# Patient Record
Sex: MALE | Race: WHITE | NOT HISPANIC OR LATINO | Employment: FULL TIME | URBAN - METROPOLITAN AREA
[De-identification: names, ages, dates, MRNs, and addresses within clinical notes are randomized per-mention and may not be internally consistent; named-entity substitution may affect disease eponyms.]

---

## 2020-03-20 ENCOUNTER — HOSPITAL ENCOUNTER (INPATIENT)
Facility: HOSPITAL | Age: 60
LOS: 2 days | Discharge: HOME | End: 2020-03-22
Attending: EMERGENCY MEDICINE | Admitting: HOSPITALIST

## 2020-03-20 ENCOUNTER — APPOINTMENT (OUTPATIENT)
Dept: CT IMAGING | Facility: HOSPITAL | Age: 60
End: 2020-03-20

## 2020-03-20 DIAGNOSIS — N28.0 KIDNEY INFARCTION (HCC): Primary | ICD-10-CM

## 2020-03-20 DIAGNOSIS — R10.9 FLANK PAIN: ICD-10-CM

## 2020-03-20 DIAGNOSIS — R11.2 INTRACTABLE VOMITING WITH NAUSEA, UNSPECIFIED VOMITING TYPE: ICD-10-CM

## 2020-03-20 LAB
ALBUMIN SERPL-MCNC: 4.3 G/DL (ref 3.5–5.2)
ALBUMIN/GLOB SERPL: 1.6 G/DL
ALP SERPL-CCNC: 69 U/L (ref 39–117)
ALT SERPL W P-5'-P-CCNC: 22 U/L (ref 1–41)
ANION GAP SERPL CALCULATED.3IONS-SCNC: 14 MMOL/L (ref 5–15)
APTT PPP: 21.1 SECONDS (ref 24–31)
AST SERPL-CCNC: 20 U/L (ref 1–40)
BACTERIA UR QL AUTO: ABNORMAL /HPF
BASOPHILS # BLD AUTO: 0.1 10*3/MM3 (ref 0–0.2)
BASOPHILS NFR BLD AUTO: 0.6 % (ref 0–1.5)
BILIRUB SERPL-MCNC: 0.8 MG/DL (ref 0.2–1.2)
BILIRUB UR QL STRIP: NEGATIVE
BUN BLD-MCNC: 10 MG/DL (ref 6–20)
BUN/CREAT SERPL: 6.8 (ref 7–25)
CALCIUM SPEC-SCNC: 10.2 MG/DL (ref 8.6–10.5)
CHLORIDE SERPL-SCNC: 102 MMOL/L (ref 98–107)
CLARITY UR: CLEAR
CO2 SERPL-SCNC: 24 MMOL/L (ref 22–29)
COLOR UR: ABNORMAL
CREAT BLD-MCNC: 1.46 MG/DL (ref 0.76–1.27)
DEPRECATED RDW RBC AUTO: 42 FL (ref 37–54)
EOSINOPHIL # BLD AUTO: 0 10*3/MM3 (ref 0–0.4)
EOSINOPHIL NFR BLD AUTO: 0.3 % (ref 0.3–6.2)
ERYTHROCYTE [DISTWIDTH] IN BLOOD BY AUTOMATED COUNT: 12.7 % (ref 12.3–15.4)
GFR SERPL CREATININE-BSD FRML MDRD: 50 ML/MIN/1.73
GLOBULIN UR ELPH-MCNC: 2.7 GM/DL
GLUCOSE BLD-MCNC: 118 MG/DL (ref 65–99)
GLUCOSE UR STRIP-MCNC: NEGATIVE MG/DL
HCT VFR BLD AUTO: 48.3 % (ref 37.5–51)
HGB BLD-MCNC: 17.1 G/DL (ref 13–17.7)
HGB UR QL STRIP.AUTO: NEGATIVE
HOLD SPECIMEN: NORMAL
HYALINE CASTS UR QL AUTO: ABNORMAL /LPF
INR PPP: 1.08 (ref 0.9–1.1)
KETONES UR QL STRIP: ABNORMAL
LEUKOCYTE ESTERASE UR QL STRIP.AUTO: NEGATIVE
LIPASE SERPL-CCNC: 17 U/L (ref 13–60)
LYMPHOCYTES # BLD AUTO: 1.5 10*3/MM3 (ref 0.7–3.1)
LYMPHOCYTES NFR BLD AUTO: 15.7 % (ref 19.6–45.3)
MCH RBC QN AUTO: 33.3 PG (ref 26.6–33)
MCHC RBC AUTO-ENTMCNC: 35.5 G/DL (ref 31.5–35.7)
MCV RBC AUTO: 93.8 FL (ref 79–97)
MONOCYTES # BLD AUTO: 0.3 10*3/MM3 (ref 0.1–0.9)
MONOCYTES NFR BLD AUTO: 3.2 % (ref 5–12)
NEUTROPHILS # BLD AUTO: 7.6 10*3/MM3 (ref 1.7–7)
NEUTROPHILS NFR BLD AUTO: 80.2 % (ref 42.7–76)
NITRITE UR QL STRIP: NEGATIVE
NRBC BLD AUTO-RTO: 0.9 /100 WBC (ref 0–0.2)
PH UR STRIP.AUTO: 6 [PH] (ref 5–8)
PLAT MORPH BLD: NORMAL
PLATELET # BLD AUTO: 189 10*3/MM3 (ref 140–450)
PMV BLD AUTO: 9.2 FL (ref 6–12)
POTASSIUM BLD-SCNC: 3.9 MMOL/L (ref 3.5–5.2)
PROT SERPL-MCNC: 7 G/DL (ref 6–8.5)
PROT UR QL STRIP: ABNORMAL
PROTHROMBIN TIME: 11.2 SECONDS (ref 9.6–11.7)
RBC # BLD AUTO: 5.15 10*6/MM3 (ref 4.14–5.8)
RBC # UR: ABNORMAL /HPF
RBC MORPH BLD: NORMAL
REF LAB TEST METHOD: ABNORMAL
SODIUM BLD-SCNC: 140 MMOL/L (ref 136–145)
SP GR UR STRIP: 1.02 (ref 1–1.03)
SQUAMOUS #/AREA URNS HPF: ABNORMAL /HPF
TROPONIN T SERPL-MCNC: <0.01 NG/ML (ref 0–0.03)
UROBILINOGEN UR QL STRIP: ABNORMAL
WBC MORPH BLD: NORMAL
WBC NRBC COR # BLD: 9.4 10*3/MM3 (ref 3.4–10.8)
WBC UR QL AUTO: ABNORMAL /HPF

## 2020-03-20 PROCEDURE — 85610 PROTHROMBIN TIME: CPT | Performed by: EMERGENCY MEDICINE

## 2020-03-20 PROCEDURE — 74177 CT ABD & PELVIS W/CONTRAST: CPT

## 2020-03-20 PROCEDURE — 99222 1ST HOSP IP/OBS MODERATE 55: CPT | Performed by: PHYSICIAN ASSISTANT

## 2020-03-20 PROCEDURE — 93005 ELECTROCARDIOGRAM TRACING: CPT | Performed by: NURSE PRACTITIONER

## 2020-03-20 PROCEDURE — 81001 URINALYSIS AUTO W/SCOPE: CPT | Performed by: NURSE PRACTITIONER

## 2020-03-20 PROCEDURE — 85007 BL SMEAR W/DIFF WBC COUNT: CPT | Performed by: EMERGENCY MEDICINE

## 2020-03-20 PROCEDURE — 25010000002 KETOROLAC TROMETHAMINE PER 15 MG: Performed by: EMERGENCY MEDICINE

## 2020-03-20 PROCEDURE — 0 IOPAMIDOL PER 1 ML: Performed by: EMERGENCY MEDICINE

## 2020-03-20 PROCEDURE — 83690 ASSAY OF LIPASE: CPT | Performed by: EMERGENCY MEDICINE

## 2020-03-20 PROCEDURE — 25010000002 ONDANSETRON PER 1 MG: Performed by: NURSE PRACTITIONER

## 2020-03-20 PROCEDURE — 93005 ELECTROCARDIOGRAM TRACING: CPT | Performed by: EMERGENCY MEDICINE

## 2020-03-20 PROCEDURE — 99284 EMERGENCY DEPT VISIT MOD MDM: CPT

## 2020-03-20 PROCEDURE — 25010000002 PROMETHAZINE PER 50 MG: Performed by: NURSE PRACTITIONER

## 2020-03-20 PROCEDURE — 84484 ASSAY OF TROPONIN QUANT: CPT | Performed by: NURSE PRACTITIONER

## 2020-03-20 PROCEDURE — 85025 COMPLETE CBC W/AUTO DIFF WBC: CPT | Performed by: EMERGENCY MEDICINE

## 2020-03-20 PROCEDURE — 85730 THROMBOPLASTIN TIME PARTIAL: CPT | Performed by: EMERGENCY MEDICINE

## 2020-03-20 PROCEDURE — 25010000002 HEPARIN (PORCINE) PER 1000 UNITS: Performed by: EMERGENCY MEDICINE

## 2020-03-20 PROCEDURE — 80053 COMPREHEN METABOLIC PANEL: CPT | Performed by: EMERGENCY MEDICINE

## 2020-03-20 RX ORDER — SODIUM CHLORIDE, SODIUM LACTATE, POTASSIUM CHLORIDE, CALCIUM CHLORIDE 600; 310; 30; 20 MG/100ML; MG/100ML; MG/100ML; MG/100ML
125 INJECTION, SOLUTION INTRAVENOUS CONTINUOUS
Status: DISCONTINUED | OUTPATIENT
Start: 2020-03-20 | End: 2020-03-21

## 2020-03-20 RX ORDER — PROMETHAZINE HYDROCHLORIDE 25 MG/ML
12.5 INJECTION, SOLUTION INTRAMUSCULAR; INTRAVENOUS ONCE
Status: COMPLETED | OUTPATIENT
Start: 2020-03-20 | End: 2020-03-20

## 2020-03-20 RX ORDER — SODIUM CHLORIDE 0.9 % (FLUSH) 0.9 %
10 SYRINGE (ML) INJECTION AS NEEDED
Status: DISCONTINUED | OUTPATIENT
Start: 2020-03-20 | End: 2020-03-22 | Stop reason: HOSPADM

## 2020-03-20 RX ORDER — HEPARIN SODIUM 10000 [USP'U]/100ML
18 INJECTION, SOLUTION INTRAVENOUS
Status: DISCONTINUED | OUTPATIENT
Start: 2020-03-20 | End: 2020-03-22

## 2020-03-20 RX ORDER — SODIUM CHLORIDE 9 MG/ML
INJECTION, SOLUTION INTRAVENOUS
Status: DISPENSED
Start: 2020-03-20 | End: 2020-03-21

## 2020-03-20 RX ORDER — KETOROLAC TROMETHAMINE 15 MG/ML
15 INJECTION, SOLUTION INTRAMUSCULAR; INTRAVENOUS ONCE
Status: COMPLETED | OUTPATIENT
Start: 2020-03-20 | End: 2020-03-20

## 2020-03-20 RX ORDER — ONDANSETRON 2 MG/ML
4 INJECTION INTRAMUSCULAR; INTRAVENOUS ONCE
Status: COMPLETED | OUTPATIENT
Start: 2020-03-20 | End: 2020-03-20

## 2020-03-20 RX ADMIN — SODIUM CHLORIDE, SODIUM LACTATE, POTASSIUM CHLORIDE, AND CALCIUM CHLORIDE 125 ML/HR: 600; 310; 30; 20 INJECTION, SOLUTION INTRAVENOUS at 22:18

## 2020-03-20 RX ADMIN — HEPARIN SODIUM 18 UNITS/KG/HR: 10000 INJECTION, SOLUTION INTRAVENOUS at 21:57

## 2020-03-20 RX ADMIN — IOPAMIDOL 100 ML: 755 INJECTION, SOLUTION INTRAVENOUS at 20:49

## 2020-03-20 RX ADMIN — ONDANSETRON 4 MG: 2 INJECTION INTRAMUSCULAR; INTRAVENOUS at 19:10

## 2020-03-20 RX ADMIN — SODIUM CHLORIDE, SODIUM LACTATE, POTASSIUM CHLORIDE, AND CALCIUM CHLORIDE 1000 ML: 600; 310; 30; 20 INJECTION, SOLUTION INTRAVENOUS at 20:31

## 2020-03-20 RX ADMIN — PROMETHAZINE HYDROCHLORIDE 12.5 MG: 25 INJECTION INTRAMUSCULAR; INTRAVENOUS at 19:50

## 2020-03-20 RX ADMIN — KETOROLAC TROMETHAMINE 15 MG: 15 INJECTION, SOLUTION INTRAMUSCULAR; INTRAVENOUS at 20:52

## 2020-03-20 NOTE — ED NOTES
Pt c/o L flank pain since this AM, denies any urinary problems      Yvette Louise RN  03/20/20 2088

## 2020-03-21 ENCOUNTER — APPOINTMENT (OUTPATIENT)
Dept: CARDIOLOGY | Facility: HOSPITAL | Age: 60
End: 2020-03-21

## 2020-03-21 PROBLEM — N17.9 AKI (ACUTE KIDNEY INJURY): Status: ACTIVE | Noted: 2020-03-21

## 2020-03-21 LAB
ALBUMIN SERPL-MCNC: 4 G/DL (ref 3.5–5.2)
ALBUMIN/GLOB SERPL: 1.7 G/DL
ALP SERPL-CCNC: 57 U/L (ref 39–117)
ALT SERPL W P-5'-P-CCNC: 25 U/L (ref 1–41)
ANION GAP SERPL CALCULATED.3IONS-SCNC: 11 MMOL/L (ref 5–15)
APTT PPP: 55.6 SECONDS (ref 61–76.5)
APTT PPP: 65.2 SECONDS (ref 61–76.5)
APTT PPP: 85.3 SECONDS (ref 61–76.5)
AST SERPL-CCNC: 28 U/L (ref 1–40)
BASOPHILS # BLD AUTO: 0 10*3/MM3 (ref 0–0.2)
BASOPHILS NFR BLD AUTO: 0.2 % (ref 0–1.5)
BH CV ECHO MEAS - ACS: 2.1 CM
BH CV ECHO MEAS - AO MAX PG (FULL): -2.4 MMHG
BH CV ECHO MEAS - AO MAX PG: 0.19 MMHG
BH CV ECHO MEAS - AO ROOT AREA (BSA CORRECTED): 1.7
BH CV ECHO MEAS - AO ROOT AREA: 6.2 CM^2
BH CV ECHO MEAS - AO ROOT DIAM: 2.8 CM
BH CV ECHO MEAS - AO V2 MAX: 21.7 CM/SEC
BH CV ECHO MEAS - ASD MEAN VEL: 57.7 CM/SEC
BH CV ECHO MEAS - AVA(V,A): 10.1 CM^2
BH CV ECHO MEAS - AVA(V,D): 10.1 CM^2
BH CV ECHO MEAS - BSA(HAYCOCK): 1.6 M^2
BH CV ECHO MEAS - BSA: 1.7 M^2
BH CV ECHO MEAS - BZI_BMI: 19 KILOGRAMS/M^2
BH CV ECHO MEAS - BZI_METRIC_HEIGHT: 172.7 CM
BH CV ECHO MEAS - BZI_METRIC_WEIGHT: 56.7 KG
BH CV ECHO MEAS - EDV(CUBED): 60.9 ML
BH CV ECHO MEAS - EDV(MOD-SP4): 85.5 ML
BH CV ECHO MEAS - EDV(TEICH): 67.3 ML
BH CV ECHO MEAS - EF(CUBED): 68.3 %
BH CV ECHO MEAS - EF(MOD-BP): 58 %
BH CV ECHO MEAS - EF(MOD-SP4): 57.8 %
BH CV ECHO MEAS - EF(TEICH): 60.4 %
BH CV ECHO MEAS - ESV(CUBED): 19.3 ML
BH CV ECHO MEAS - ESV(MOD-SP4): 36.1 ML
BH CV ECHO MEAS - ESV(TEICH): 26.6 ML
BH CV ECHO MEAS - FS: 31.8 %
BH CV ECHO MEAS - IVS/LVPW: 0.89
BH CV ECHO MEAS - IVSD: 0.91 CM
BH CV ECHO MEAS - LA DIMENSION(2D): 3.3 CM
BH CV ECHO MEAS - LV DIASTOLIC VOL/BSA (35-75): 51.1 ML/M^2
BH CV ECHO MEAS - LV MASS(C)D: 118.1 GRAMS
BH CV ECHO MEAS - LV MASS(C)DI: 70.6 GRAMS/M^2
BH CV ECHO MEAS - LV MAX PG: 2.6 MMHG
BH CV ECHO MEAS - LV MEAN PG: 1.4 MMHG
BH CV ECHO MEAS - LV SYSTOLIC VOL/BSA (12-30): 21.6 ML/M^2
BH CV ECHO MEAS - LV V1 MAX: 79.9 CM/SEC
BH CV ECHO MEAS - LV V1 MEAN: 54.4 CM/SEC
BH CV ECHO MEAS - LV V1 VTI: 20.9 CM
BH CV ECHO MEAS - LVIDD: 3.9 CM
BH CV ECHO MEAS - LVIDS: 2.7 CM
BH CV ECHO MEAS - LVOT AREA: 2.8 CM^2
BH CV ECHO MEAS - LVOT DIAM: 1.9 CM
BH CV ECHO MEAS - LVPWD: 1 CM
BH CV ECHO MEAS - MV MAX PG: 3.5 MMHG
BH CV ECHO MEAS - MV MEAN PG: 0.79 MMHG
BH CV ECHO MEAS - MV V2 MAX: 94.1 CM/SEC
BH CV ECHO MEAS - MV V2 MEAN: 35.5 CM/SEC
BH CV ECHO MEAS - MV V2 VTI: 31.3 CM
BH CV ECHO MEAS - MVA(VTI): 1.8 CM^2
BH CV ECHO MEAS - PA MAX PG: 3.2 MMHG
BH CV ECHO MEAS - PA V2 MAX: 90 CM/SEC
BH CV ECHO MEAS - PI END-D VEL: 100.6 CM/SEC
BH CV ECHO MEAS - PI MAX PG: 8.7 MMHG
BH CV ECHO MEAS - PI MAX VEL: 147.9 CM/SEC
BH CV ECHO MEAS - RAP SYSTOLE: 3 MMHG
BH CV ECHO MEAS - RVDD: 1.9 CM
BH CV ECHO MEAS - RVSP: 16.3 MMHG
BH CV ECHO MEAS - SI(CUBED): 24.8 ML/M^2
BH CV ECHO MEAS - SI(LVOT): 34.5 ML/M^2
BH CV ECHO MEAS - SI(MOD-SP4): 29.5 ML/M^2
BH CV ECHO MEAS - SI(TEICH): 24.3 ML/M^2
BH CV ECHO MEAS - SV(CUBED): 41.6 ML
BH CV ECHO MEAS - SV(LVOT): 57.7 ML
BH CV ECHO MEAS - SV(MOD-SP4): 49.4 ML
BH CV ECHO MEAS - SV(TEICH): 40.7 ML
BH CV ECHO MEAS - TR MAX VEL: 182.1 CM/SEC
BILIRUB SERPL-MCNC: 1 MG/DL (ref 0.2–1.2)
BUN BLD-MCNC: 12 MG/DL (ref 6–20)
BUN/CREAT SERPL: 6.6 (ref 7–25)
CALCIUM SPEC-SCNC: 9.3 MG/DL (ref 8.6–10.5)
CHLORIDE SERPL-SCNC: 103 MMOL/L (ref 98–107)
CK SERPL-CCNC: 105 U/L (ref 20–200)
CO2 SERPL-SCNC: 26 MMOL/L (ref 22–29)
CREAT BLD-MCNC: 1.81 MG/DL (ref 0.76–1.27)
DEPRECATED RDW RBC AUTO: 42.9 FL (ref 37–54)
EOSINOPHIL # BLD AUTO: 0 10*3/MM3 (ref 0–0.4)
EOSINOPHIL NFR BLD AUTO: 0 % (ref 0.3–6.2)
ERYTHROCYTE [DISTWIDTH] IN BLOOD BY AUTOMATED COUNT: 12.9 % (ref 12.3–15.4)
GFR SERPL CREATININE-BSD FRML MDRD: 39 ML/MIN/1.73
GLOBULIN UR ELPH-MCNC: 2.3 GM/DL
GLUCOSE BLD-MCNC: 129 MG/DL (ref 65–99)
HCT VFR BLD AUTO: 42.2 % (ref 37.5–51)
HGB BLD-MCNC: 15.4 G/DL (ref 13–17.7)
INR PPP: 1.11 (ref 0.9–1.1)
LYMPHOCYTES # BLD AUTO: 1.2 10*3/MM3 (ref 0.7–3.1)
LYMPHOCYTES NFR BLD AUTO: 10.8 % (ref 19.6–45.3)
MAGNESIUM SERPL-MCNC: 2 MG/DL (ref 1.6–2.6)
MCH RBC QN AUTO: 34.9 PG (ref 26.6–33)
MCHC RBC AUTO-ENTMCNC: 36.4 G/DL (ref 31.5–35.7)
MCV RBC AUTO: 95.7 FL (ref 79–97)
MONOCYTES # BLD AUTO: 0.2 10*3/MM3 (ref 0.1–0.9)
MONOCYTES NFR BLD AUTO: 2.1 % (ref 5–12)
NEUTROPHILS # BLD AUTO: 9.7 10*3/MM3 (ref 1.7–7)
NEUTROPHILS NFR BLD AUTO: 86.9 % (ref 42.7–76)
NRBC BLD AUTO-RTO: 0.1 /100 WBC (ref 0–0.2)
NT-PROBNP SERPL-MCNC: 763.1 PG/ML (ref 5–900)
PHOSPHATE SERPL-MCNC: 3.7 MG/DL (ref 2.5–4.5)
PLATELET # BLD AUTO: 167 10*3/MM3 (ref 140–450)
PMV BLD AUTO: 9.5 FL (ref 6–12)
POTASSIUM BLD-SCNC: 4.3 MMOL/L (ref 3.5–5.2)
PROT SERPL-MCNC: 6.3 G/DL (ref 6–8.5)
PROTHROMBIN TIME: 11.4 SECONDS (ref 9.6–11.7)
RBC # BLD AUTO: 4.41 10*6/MM3 (ref 4.14–5.8)
SODIUM BLD-SCNC: 140 MMOL/L (ref 136–145)
TROPONIN T SERPL-MCNC: <0.01 NG/ML (ref 0–0.03)
TROPONIN T SERPL-MCNC: <0.01 NG/ML (ref 0–0.03)
WBC NRBC COR # BLD: 11.2 10*3/MM3 (ref 3.4–10.8)

## 2020-03-21 PROCEDURE — 93306 TTE W/DOPPLER COMPLETE: CPT

## 2020-03-21 PROCEDURE — 83880 ASSAY OF NATRIURETIC PEPTIDE: CPT | Performed by: PHYSICIAN ASSISTANT

## 2020-03-21 PROCEDURE — 25010000002 ONDANSETRON PER 1 MG: Performed by: PHYSICIAN ASSISTANT

## 2020-03-21 PROCEDURE — 99254 IP/OBS CNSLTJ NEW/EST MOD 60: CPT | Performed by: INTERNAL MEDICINE

## 2020-03-21 PROCEDURE — 84100 ASSAY OF PHOSPHORUS: CPT | Performed by: PHYSICIAN ASSISTANT

## 2020-03-21 PROCEDURE — 85610 PROTHROMBIN TIME: CPT | Performed by: PHYSICIAN ASSISTANT

## 2020-03-21 PROCEDURE — 25010000002 MORPHINE PER 10 MG: Performed by: PHYSICIAN ASSISTANT

## 2020-03-21 PROCEDURE — 85025 COMPLETE CBC W/AUTO DIFF WBC: CPT | Performed by: PHYSICIAN ASSISTANT

## 2020-03-21 PROCEDURE — 25010000002 LORAZEPAM PER 2 MG: Performed by: PHYSICIAN ASSISTANT

## 2020-03-21 PROCEDURE — 83735 ASSAY OF MAGNESIUM: CPT | Performed by: PHYSICIAN ASSISTANT

## 2020-03-21 PROCEDURE — 93306 TTE W/DOPPLER COMPLETE: CPT | Performed by: INTERNAL MEDICINE

## 2020-03-21 PROCEDURE — 99233 SBSQ HOSP IP/OBS HIGH 50: CPT | Performed by: HOSPITALIST

## 2020-03-21 PROCEDURE — 84484 ASSAY OF TROPONIN QUANT: CPT | Performed by: PHYSICIAN ASSISTANT

## 2020-03-21 PROCEDURE — 80053 COMPREHEN METABOLIC PANEL: CPT | Performed by: PHYSICIAN ASSISTANT

## 2020-03-21 PROCEDURE — 82550 ASSAY OF CK (CPK): CPT | Performed by: PHYSICIAN ASSISTANT

## 2020-03-21 PROCEDURE — 85730 THROMBOPLASTIN TIME PARTIAL: CPT | Performed by: PHYSICIAN ASSISTANT

## 2020-03-21 PROCEDURE — 25010000002 SULFUR HEXAFLUORIDE MICROSPH 60.7-25 MG RECONSTITUTED SUSPENSION: Performed by: HOSPITALIST

## 2020-03-21 PROCEDURE — 25010000002 HEPARIN (PORCINE) PER 1000 UNITS: Performed by: PHYSICIAN ASSISTANT

## 2020-03-21 PROCEDURE — 85730 THROMBOPLASTIN TIME PARTIAL: CPT | Performed by: HOSPITALIST

## 2020-03-21 RX ORDER — POTASSIUM CHLORIDE 1.5 G/1.77G
40 POWDER, FOR SOLUTION ORAL AS NEEDED
Status: DISCONTINUED | OUTPATIENT
Start: 2020-03-21 | End: 2020-03-22 | Stop reason: HOSPADM

## 2020-03-21 RX ORDER — CALCIUM GLUCONATE 20 MG/ML
2 INJECTION, SOLUTION INTRAVENOUS AS NEEDED
Status: DISCONTINUED | OUTPATIENT
Start: 2020-03-21 | End: 2020-03-22 | Stop reason: HOSPADM

## 2020-03-21 RX ORDER — MAGNESIUM SULFATE HEPTAHYDRATE 40 MG/ML
4 INJECTION, SOLUTION INTRAVENOUS AS NEEDED
Status: DISCONTINUED | OUTPATIENT
Start: 2020-03-21 | End: 2020-03-22 | Stop reason: HOSPADM

## 2020-03-21 RX ORDER — DOCUSATE SODIUM 100 MG/1
100 CAPSULE, LIQUID FILLED ORAL 2 TIMES DAILY PRN
Status: DISCONTINUED | OUTPATIENT
Start: 2020-03-21 | End: 2020-03-22 | Stop reason: HOSPADM

## 2020-03-21 RX ORDER — CALCIUM GLUCONATE 20 MG/ML
1 INJECTION, SOLUTION INTRAVENOUS AS NEEDED
Status: DISCONTINUED | OUTPATIENT
Start: 2020-03-21 | End: 2020-03-22 | Stop reason: HOSPADM

## 2020-03-21 RX ORDER — LORAZEPAM 2 MG/ML
0.5 INJECTION INTRAMUSCULAR ONCE
Status: COMPLETED | OUTPATIENT
Start: 2020-03-21 | End: 2020-03-21

## 2020-03-21 RX ORDER — ONDANSETRON 4 MG/1
4 TABLET, FILM COATED ORAL EVERY 6 HOURS PRN
Status: DISCONTINUED | OUTPATIENT
Start: 2020-03-21 | End: 2020-03-22 | Stop reason: HOSPADM

## 2020-03-21 RX ORDER — ACETAMINOPHEN 650 MG/1
650 SUPPOSITORY RECTAL EVERY 4 HOURS PRN
Status: DISCONTINUED | OUTPATIENT
Start: 2020-03-21 | End: 2020-03-22 | Stop reason: HOSPADM

## 2020-03-21 RX ORDER — SODIUM CHLORIDE 0.9 % (FLUSH) 0.9 %
10 SYRINGE (ML) INJECTION AS NEEDED
Status: DISCONTINUED | OUTPATIENT
Start: 2020-03-21 | End: 2020-03-22 | Stop reason: HOSPADM

## 2020-03-21 RX ORDER — MAGNESIUM SULFATE HEPTAHYDRATE 40 MG/ML
2 INJECTION, SOLUTION INTRAVENOUS AS NEEDED
Status: DISCONTINUED | OUTPATIENT
Start: 2020-03-21 | End: 2020-03-22 | Stop reason: HOSPADM

## 2020-03-21 RX ORDER — ONDANSETRON 2 MG/ML
4 INJECTION INTRAMUSCULAR; INTRAVENOUS EVERY 6 HOURS PRN
Status: DISCONTINUED | OUTPATIENT
Start: 2020-03-21 | End: 2020-03-22 | Stop reason: HOSPADM

## 2020-03-21 RX ORDER — NITROGLYCERIN 0.4 MG/1
0.4 TABLET SUBLINGUAL
Status: DISCONTINUED | OUTPATIENT
Start: 2020-03-21 | End: 2020-03-22 | Stop reason: HOSPADM

## 2020-03-21 RX ORDER — MORPHINE SULFATE 4 MG/ML
2 INJECTION, SOLUTION INTRAMUSCULAR; INTRAVENOUS EVERY 4 HOURS PRN
Status: DISCONTINUED | OUTPATIENT
Start: 2020-03-21 | End: 2020-03-22 | Stop reason: HOSPADM

## 2020-03-21 RX ORDER — ACETAMINOPHEN 160 MG/5ML
650 SOLUTION ORAL EVERY 4 HOURS PRN
Status: DISCONTINUED | OUTPATIENT
Start: 2020-03-21 | End: 2020-03-22 | Stop reason: HOSPADM

## 2020-03-21 RX ORDER — SODIUM CHLORIDE 0.9 % (FLUSH) 0.9 %
10 SYRINGE (ML) INJECTION EVERY 12 HOURS SCHEDULED
Status: DISCONTINUED | OUTPATIENT
Start: 2020-03-21 | End: 2020-03-22 | Stop reason: HOSPADM

## 2020-03-21 RX ORDER — ACETAMINOPHEN 325 MG/1
650 TABLET ORAL EVERY 4 HOURS PRN
Status: DISCONTINUED | OUTPATIENT
Start: 2020-03-21 | End: 2020-03-22 | Stop reason: HOSPADM

## 2020-03-21 RX ORDER — MORPHINE SULFATE 4 MG/ML
2 INJECTION, SOLUTION INTRAMUSCULAR; INTRAVENOUS ONCE
Status: COMPLETED | OUTPATIENT
Start: 2020-03-21 | End: 2020-03-21

## 2020-03-21 RX ORDER — POTASSIUM CHLORIDE 20 MEQ/1
40 TABLET, EXTENDED RELEASE ORAL AS NEEDED
Status: DISCONTINUED | OUTPATIENT
Start: 2020-03-21 | End: 2020-03-22 | Stop reason: HOSPADM

## 2020-03-21 RX ORDER — CHOLECALCIFEROL (VITAMIN D3) 125 MCG
5 CAPSULE ORAL NIGHTLY PRN
Status: DISCONTINUED | OUTPATIENT
Start: 2020-03-21 | End: 2020-03-22 | Stop reason: HOSPADM

## 2020-03-21 RX ADMIN — MORPHINE SULFATE 2 MG: 4 INJECTION INTRAVENOUS at 04:23

## 2020-03-21 RX ADMIN — Medication 10 ML: at 20:01

## 2020-03-21 RX ADMIN — MORPHINE SULFATE 2 MG: 4 INJECTION INTRAVENOUS at 17:07

## 2020-03-21 RX ADMIN — SULFUR HEXAFLUORIDE 2 ML: KIT at 12:30

## 2020-03-21 RX ADMIN — SODIUM CHLORIDE, SODIUM LACTATE, POTASSIUM CHLORIDE, AND CALCIUM CHLORIDE 125 ML/HR: 600; 310; 30; 20 INJECTION, SOLUTION INTRAVENOUS at 04:25

## 2020-03-21 RX ADMIN — HEPARIN SODIUM 18 UNITS/KG/HR: 10000 INJECTION, SOLUTION INTRAVENOUS at 17:50

## 2020-03-21 RX ADMIN — ONDANSETRON 4 MG: 2 INJECTION INTRAMUSCULAR; INTRAVENOUS at 01:12

## 2020-03-21 RX ADMIN — LORAZEPAM 0.5 MG: 2 INJECTION INTRAMUSCULAR; INTRAVENOUS at 04:23

## 2020-03-21 RX ADMIN — MORPHINE SULFATE 2 MG: 4 INJECTION INTRAVENOUS at 01:12

## 2020-03-21 NOTE — CONSULTS
Referring Provider: Hospitalist  Reason for Consultation: Left renal artery occlusion    Patient Care Team:  Provider, No Known as PCP - General    Chief complaint flank pain    Subjective .     History of present illness:  Damien Peña is a 59 y.o. male with history of coronary disease status post stent placement history of hypertension hyperlipidemia presented to the hospital with left-sided back pain and flank pain.  Patient also had some nausea vomiting.  Patient did not have any symptoms of chest pain or shortness of breath.  No complains of any PND orthopnea.  No palpitations dizziness syncope or swelling of the feet.  Patient had blood in the urine and had a CT scan which showed complete infarction of the left kidney with occlusion of the left renal artery he also had decreased flow in the left renal vein and he was admitted to the hospital started on IV heparin.  Cardiology consultation is obtained for choice of anticoagulation.    Review of Systems   Constitution: Negative for fever and malaise/fatigue.   HENT: Negative for ear pain and nosebleeds.    Eyes: Negative for blurred vision and double vision.   Cardiovascular: Negative for chest pain, dyspnea on exertion and palpitations.   Respiratory: Negative for cough and shortness of breath.    Skin: Negative for rash.   Musculoskeletal: Positive for back pain. Negative for joint pain.   Gastrointestinal: Negative for abdominal pain, nausea and vomiting.   Neurological: Negative for focal weakness and headaches.   Psychiatric/Behavioral: Negative for depression. The patient is not nervous/anxious.    All other systems reviewed and are negative.      History  Past Medical History:   Diagnosis Date   • CAD (coronary artery disease)    • Tobacco abuse        Past Surgical History:   Procedure Laterality Date   • APPENDECTOMY     • COLONOSCOPY         Family History   Problem Relation Age of Onset   • Heart disease Mother    • Coronary artery disease  "Father        Social History     Tobacco Use   • Smoking status: Current Every Day Smoker     Packs/day: 1.50     Years: 30.00     Pack years: 45.00     Types: Cigarettes   • Smokeless tobacco: Never Used   Substance Use Topics   • Alcohol use: Yes     Comment: beer occasionally   • Drug use: Never        No medications prior to admission.         Patient has no known allergies.    Scheduled Meds:    sodium chloride 10 mL Intravenous Q12H     Continuous Infusions:    heparin 18 Units/kg/hr Last Rate: 16 Units/kg/hr (03/21/20 0426)     PRN Meds:.•  acetaminophen **OR** acetaminophen **OR** acetaminophen  •  Calcium Gluconate-NaCl **AND** calcium gluconate **AND** Calcium, Ionized  •  docusate sodium  •  heparin  •  heparin  •  influenza vaccine  •  ketamine (KETALAR) infusion **AND** Ketamine Vital Signs & Assessment  •  magnesium sulfate **OR** magnesium sulfate **OR** magnesium sulfate  •  melatonin  •  Morphine  •  nitroglycerin  •  ondansetron **OR** ondansetron  •  potassium & sodium phosphates **OR** potassium & sodium phosphates  •  potassium chloride  •  potassium chloride  •  [COMPLETED] Insert peripheral IV **AND** sodium chloride  •  sodium chloride    Objective     VITAL SIGNS  Vitals:    03/20/20 2331 03/21/20 0014 03/21/20 1055 03/21/20 1323   BP: 118/72 123/73 123/73 119/75   BP Location:  Left arm     Patient Position:  Lying     Pulse: 57 (!) 49  52   Resp: 18 16  16   Temp:  98.1 °F (36.7 °C)  98.3 °F (36.8 °C)   TempSrc:  Oral     SpO2: 98% 97%  96%   Weight:  56.7 kg (125 lb) 56.7 kg (125 lb)    Height:  172.7 cm (68\") 172.7 cm (68\")        Flowsheet Rows      First Filed Value   Admission Height  172.7 cm (68\") Documented at 03/20/2020 1845   Admission Weight  56.7 kg (125 lb) Documented at 03/20/2020 1845           TELEMETRY: Sinus rhythm with nonspecific ST segment abnormality  Physical Exam:  Physical Exam   Constitutional: He appears well-developed and well-nourished.   HENT:   Head: " Normocephalic and atraumatic.   Eyes: Pupils are equal, round, and reactive to light. Conjunctivae and EOM are normal. No scleral icterus.   Neck: Normal range of motion. Neck supple. No JVD present. Carotid bruit is not present.   Cardiovascular: Normal rate, regular rhythm, S1 normal, S2 normal, normal heart sounds and intact distal pulses. PMI is not displaced.   Pulmonary/Chest: Effort normal and breath sounds normal. He has no wheezes. He has no rales.   Abdominal: Soft. Bowel sounds are normal.   Musculoskeletal: Normal range of motion.   Neurological: He is alert. He has normal strength.   No focal deficits   Skin: Skin is warm and dry. No rash noted.   Psychiatric: He has a normal mood and affect.        Results Review:   I reviewed the patient's new clinical results.  Lab Results (last 24 hours)     Procedure Component Value Units Date/Time    aPTT [[id number]]  (Abnormal) Collected:  03/21/20 1545    Specimen:  Blood Updated:  03/21/20 1616     PTT 55.6 seconds     Troponin [[id number]]  (Normal) Collected:  03/21/20 1008    Specimen:  Blood Updated:  03/21/20 1101     Troponin T <0.010 ng/mL     Narrative:       Troponin T Reference Range:  <= 0.03 ng/mL-   Negative for AMI  >0.03 ng/mL-     Abnormal for myocardial necrosis.  Clinicians would have to utilize clinical acumen, EKG, Troponin and serial changes to determine if it is an Acute Myocardial Infarction or myocardial injury due to an underlying chronic condition.       Results may be falsely decreased if patient taking Biotin.      aPTT [[id number]]  (Normal) Collected:  03/21/20 1008    Specimen:  Blood Updated:  03/21/20 1051     PTT 65.2 seconds     CBC & Differential [[id number]] Collected:  03/21/20 0257    Specimen:  Blood Updated:  03/21/20 0452    Narrative:       The following orders were created for panel order CBC & Differential.  Procedure                               Abnormality         Status                     ---------                                -----------         ------                     CBC Auto Differential[[id number]]        Abnormal            Final result                 Please view results for these tests on the individual orders.    CBC Auto Differential [[id number]]  (Abnormal) Collected:  03/21/20 0257    Specimen:  Blood Updated:  03/21/20 0452     WBC 11.20 10*3/mm3      RBC 4.41 10*6/mm3      Hemoglobin 15.4 g/dL      Hematocrit 42.2 %      MCV 95.7 fL      MCH 34.9 pg      MCHC 36.4 g/dL      RDW 12.9 %      RDW-SD 42.9 fl      MPV 9.5 fL      Platelets 167 10*3/mm3      Neutrophil % 86.9 %      Lymphocyte % 10.8 %      Monocyte % 2.1 %      Eosinophil % 0.0 %      Basophil % 0.2 %      Neutrophils, Absolute 9.70 10*3/mm3      Lymphocytes, Absolute 1.20 10*3/mm3      Monocytes, Absolute 0.20 10*3/mm3      Eosinophils, Absolute 0.00 10*3/mm3      Basophils, Absolute 0.00 10*3/mm3      nRBC 0.1 /100 WBC     CK [[id number]]  (Normal) Collected:  03/21/20 0257    Specimen:  Blood Updated:  03/21/20 0418     Creatine Kinase 105 U/L     Comprehensive Metabolic Panel [[id number]]  (Abnormal) Collected:  03/21/20 0257    Specimen:  Blood Updated:  03/21/20 0418     Glucose 129 mg/dL      BUN 12 mg/dL      Creatinine 1.81 mg/dL      Sodium 140 mmol/L      Potassium 4.3 mmol/L      Chloride 103 mmol/L      CO2 26.0 mmol/L      Calcium 9.3 mg/dL      Total Protein 6.3 g/dL      Albumin 4.00 g/dL      ALT (SGPT) 25 U/L      AST (SGOT) 28 U/L      Alkaline Phosphatase 57 U/L      Total Bilirubin 1.0 mg/dL      eGFR Non African Amer 39 mL/min/1.73      Globulin 2.3 gm/dL      A/G Ratio 1.7 g/dL      BUN/Creatinine Ratio 6.6     Anion Gap 11.0 mmol/L     Narrative:       GFR Normal >60  Chronic Kidney Disease <60  Kidney Failure <15      Troponin [[id number]]  (Normal) Collected:  03/21/20 0257    Specimen:  Blood Updated:  03/21/20 0418     Troponin T <0.010 ng/mL     Narrative:       Troponin T Reference Range:  <= 0.03 ng/mL-   Negative for  AMI  >0.03 ng/mL-     Abnormal for myocardial necrosis.  Clinicians would have to utilize clinical acumen, EKG, Troponin and serial changes to determine if it is an Acute Myocardial Infarction or myocardial injury due to an underlying chronic condition.       Results may be falsely decreased if patient taking Biotin.      Phosphorus [[id number]]  (Normal) Collected:  03/21/20 0257    Specimen:  Blood Updated:  03/21/20 0418     Phosphorus 3.7 mg/dL     Magnesium [[id number]]  (Normal) Collected:  03/21/20 0257    Specimen:  Blood Updated:  03/21/20 0417     Magnesium 2.0 mg/dL     BNP [[id number]]  (Normal) Collected:  03/21/20 0257    Specimen:  Blood Updated:  03/21/20 0412     proBNP 763.1 pg/mL     Narrative:       Among patients with dyspnea, NT-proBNP is highly sensitive for the detection of acute congestive heart failure. In addition NT-proBNP of <300 pg/ml effectively rules out acute congestive heart failure with 99% negative predictive value.    Results may be falsely decreased if patient taking Biotin.      Protime-INR [[id number]]  (Abnormal) Collected:  03/21/20 0257    Specimen:  Blood Updated:  03/21/20 0400     Protime 11.4 Seconds      INR 1.11    aPTT [[id number]]  (Abnormal) Collected:  03/21/20 0257    Specimen:  Blood Updated:  03/21/20 0400     PTT 85.3 seconds      Comment: Result checked        Protime-INR [[id number]]  (Normal) Collected:  03/20/20 2145    Specimen:  Blood Updated:  03/20/20 2217     Protime 11.2 Seconds      INR 1.08    aPTT [[id number]]  (Abnormal) Collected:  03/20/20 2145    Specimen:  Blood Updated:  03/20/20 2217     PTT 21.1 seconds     Extra Tubes [[id number]] Collected:  03/20/20 1911    Specimen:  Blood, Venous Line Updated:  03/20/20 2015    Narrative:       The following orders were created for panel order Extra Tubes.  Procedure                               Abnormality         Status                     ---------                               -----------         ------                      Gold Top - SST[[id number]]                                   Final result                 Please view results for these tests on the individual orders.    [facility] hospital [[id number]] Collected:  03/20/20 1911    Specimen:  Blood Updated:  03/20/20 2015     Extra Tube Hold for add-ons.     Comment: Auto resulted.       CBC & Differential [[id number]] Collected:  03/20/20 1911    Specimen:  Blood Updated:  03/20/20 1952    Narrative:       The following orders were created for panel order CBC & Differential.  Procedure                               Abnormality         Status                     ---------                               -----------         ------                     CBC Auto Differential[[id number]]        Abnormal            Final result                 Please view results for these tests on the individual orders.    CBC Auto Differential [[id number]]  (Abnormal) Collected:  03/20/20 1911    Specimen:  Blood Updated:  03/20/20 1952     WBC 9.40 10*3/mm3      RBC 5.15 10*6/mm3      Hemoglobin 17.1 g/dL      Hematocrit 48.3 %      MCV 93.8 fL      MCH 33.3 pg      MCHC 35.5 g/dL      RDW 12.7 %      RDW-SD 42.0 fl      MPV 9.2 fL      Platelets 189 10*3/mm3      Neutrophil % 80.2 %      Lymphocyte % 15.7 %      Monocyte % 3.2 %      Eosinophil % 0.3 %      Basophil % 0.6 %      Neutrophils, Absolute 7.60 10*3/mm3      Lymphocytes, Absolute 1.50 10*3/mm3      Monocytes, Absolute 0.30 10*3/mm3      Eosinophils, Absolute 0.00 10*3/mm3      Basophils, Absolute 0.10 10*3/mm3      nRBC 0.9 /100 WBC     Scan Slide [[id number]]  (Normal) Collected:  03/20/20 1911    Specimen:  Blood Updated:  03/20/20 1952     RBC Morphology Normal     WBC Morphology Normal     Platelet Morphology Normal    Narrative:       Slide Reviewed    Comprehensive Metabolic Panel [[id number]]  (Abnormal) Collected:  03/20/20 1911    Specimen:  Blood Updated:  03/20/20 1947     Glucose 118 mg/dL      BUN 10 mg/dL       Creatinine 1.46 mg/dL      Sodium 140 mmol/L      Potassium 3.9 mmol/L      Chloride 102 mmol/L      CO2 24.0 mmol/L      Calcium 10.2 mg/dL      Total Protein 7.0 g/dL      Albumin 4.30 g/dL      ALT (SGPT) 22 U/L      AST (SGOT) 20 U/L      Alkaline Phosphatase 69 U/L      Total Bilirubin 0.8 mg/dL      eGFR Non African Amer 50 mL/min/1.73      Globulin 2.7 gm/dL      A/G Ratio 1.6 g/dL      BUN/Creatinine Ratio 6.8     Anion Gap 14.0 mmol/L     Narrative:       GFR Normal >60  Chronic Kidney Disease <60  Kidney Failure <15      Lipase [[id number]]  (Normal) Collected:  03/20/20 1911    Specimen:  Blood Updated:  03/20/20 1947     Lipase 17 U/L     Troponin [[id number]]  (Normal) Collected:  03/20/20 1911    Specimen:  Blood Updated:  03/20/20 1947     Troponin T <0.010 ng/mL     Narrative:       Troponin T Reference Range:  <= 0.03 ng/mL-   Negative for AMI  >0.03 ng/mL-     Abnormal for myocardial necrosis.  Clinicians would have to utilize clinical acumen, EKG, Troponin and serial changes to determine if it is an Acute Myocardial Infarction or myocardial injury due to an underlying chronic condition.       Results may be falsely decreased if patient taking Biotin.      Urinalysis With Culture If Indicated - Urine, Clean Catch [[id number]]  (Abnormal) Collected:  03/20/20 1915    Specimen:  Urine, Clean Catch Updated:  03/20/20 1926     Color, UA Dark Yellow     Appearance, UA Clear     pH, UA 6.0     Specific Gravity, UA 1.021     Glucose, UA Negative     Ketones, UA Trace     Bilirubin, UA Negative     Blood, UA Negative     Protein, UA 30 mg/dL (1+)     Leuk Esterase, UA Negative     Nitrite, UA Negative     Urobilinogen, UA 0.2 E.U./dL    Urinalysis, Microscopic Only - Urine, Clean Catch [[id number]]  (Abnormal) Collected:  03/20/20 1915    Specimen:  Urine, Clean Catch Updated:  03/20/20 1926     RBC, UA 0-2 /HPF      WBC, UA 3-5 /HPF      Bacteria, UA None Seen /HPF      Squamous Epithelial Cells, UA None Seen  /HPF      Hyaline Casts, UA 3-6 /LPF      Methodology Automated Microscopy          Imaging Results (Last 24 Hours)     Procedure Component Value Units Date/Time    CT Abdomen Pelvis With Contrast [[id number]] Collected:  03/20/20 2119     Updated:  03/20/20 2225    Addenda:        This is an addendum to the CT abdomen pelvis from 03/20/2020. There are  at least 2 areas in the apical portion of the lumen of the left  ventricle suggesting possible thrombi. This was discussed with Dr. Sandra  and Dr Dixon.     Electronically Signed By-Alissa Aguirre On:3/20/2020 10:21 PM  This report was finalized on [id number] by  Alissa Aguirre, .  Signed:  03/20/20 2221 by Alissa Aguirre MD    Narrative:       CT ABDOMEN PELVIS W CONTRAST-     Date of Exam: 3/20/2020 8:35 PM     Indication: flank pain.  Left flank pain     Comparison: None available.     Technique: Contiguous axial CT images were obtained from the lung bases  to the superior iliac crests without contrast.  Following uneventful IV  administration of 100 Isovue-370 and oral contrast, contiguous axial  images obtained from lung bases to the pubic symphysis. Sagittal and  coronal reconstructions were performed.  Automated exposure control and  iterative reconstruction methods were used.     FINDINGS:  There is a small hiatal hernia. There is minor bandlike atelectasis in  the left lower lobe. No abnormality seen in the gallbladder. There is a  subcentimeter hypodense liver lesion consistent with a cyst. No  abnormality is seen in the adrenals, right kidney, spleen, pancreas, or  aorta.     There is absence of perfusion of most of the left kidney. There is a  parenchymal enhancement of part of the medial aspect of the left kidney.  The proximal part of the left renal artery is opacified;  about 1 cm  from its origin, the left renal artery appears roughly unopacified.  There is contrast in the left renal vein; the midportion of the left  renal vein is not opacified as  well as the rest.     There is minor aortic atherosclerosis. The right renal artery is patent.  The superior mesenteric and celiac arteries appear patent.     There is a long linear defect in the left gonadal vein suggesting a  nonoccluding thrombus. The left gonadal vein appears to drain into the  portal vein.      Parts of the stomach are contracted. Small bowel appears within normal.  The appendix is not identified and has reportedly been removed. Part of  the sigmoid is contracted. There is no free fluid or free air. No  ureteral stone. The bladder wall appears thickened, likely due to  incomplete distention.          Impression:          1. The appearance of the left kidney is consistent with almost complete  infarction. There is evidence for occlusion of the left renal artery  about 1 cm from its origin.  2. The appearance of the left renal vein is most likely due to decreased  flow from the arterial obstruction.  3. Suspect a nonoccluding thrombus in the left gonadal vein, which  empties into the portal vein..  4. Findings were discussed with Dr. Dixon by myself.     Electronically Signed By-Alissa Aguirre On:3/20/2020 9:31 PM  This report was finalized on [id number] by  Alissa Aguirre, .          EKG      I personally viewed and interpreted the patient's EKG/Telemetry data:    ECHOCARDIOGRAM:      STRESS MYOVIEW:    CARDIAC CATHETERIZATION:    OTHER:         Assessment/Plan     Principal Problem:    Kidney infarction (CMS/HCC)  Active Problems:    CAD (coronary artery disease)    Tobacco abuse    JOSE MANUEL (acute kidney injury) (CMS/HCC)  Left renal artery occlusion  Hypertension  Hyperlipidemia    Patient presented with back pain flank pain and had left renal artery occlusion with left kidney infarction  Patient is currently stable from cardiac status  Patient is currently on IV heparin.  Patient will probably benefit from Eliquis 5 mg twice daily for his hypercoagulable condition  Blood pressure and heart rate  are stable  Patient's renal function is being watched by the primary care physician.  No further cardiac work-up at this time and follow-up as an outpatient  I discussed the patients findings and my recommendations with patient and nurse    Pelon Ramsey MD  03/21/20  17:26

## 2020-03-21 NOTE — H&P
[facility] Medicine Services      Patient Name: Damien Peña  : 1960  MRN: [id number]  Primary Care Physician: Paresh, No Known  Date of admission: 3/20/2020    Patient Care Team:  Provider, No Known as PCP - General          Subjective   History Present Illness     Chief Complaint:   Chief Complaint   Patient presents with   • Flank Pain       Mr. Peña is a 59 y.o. male with past medical history of CAD status post stenting and tobacco abuse who presents to [facility] complaining of left-sided flank and back pain.  Patient states that approximately 1300 hrs. on 3/20/2020 following p.o. intake he began experiencing an aching pain in his left flank radiating to his left back rated at 9/10 made worse with movement.  In addition to the constant aching pain patient does report intermittent sharp pains.  Some associated nausea and vomiting and a burning sensation in his chest are reported however patient denies any shortness of air, cough, fever, changes in bowel or bladder habits or recent sick contacts.    In the ED patient found to have labs significant for troponin less than 0.010, creatinine: 1.46, remainder of CMP and CBC generally unremarkable.  UA shows trace ketones with 1+ protein, 0-2 RBCs and 3-5 WBCs.  CT of the abdomen shows the appearance of the left kidney consistent with complete infarction and evidence of occlusion of the left renal artery.  Decreased flow in the left renal vein as well as a nonoccluding thrombus in the left gonadal vein is also reported.  EKG shows sinus bradycardia at 84 without obvious ST changes or ectopy.         History of Present Illness    Review of Systems   Constitution: Negative.   HENT: Negative.    Eyes: Negative.    Cardiovascular: Positive for chest pain.   Respiratory: Negative.    Endocrine: Negative.    Skin: Negative.    Gastrointestinal: Positive for abdominal pain, nausea and vomiting.   Genitourinary: Positive  for flank pain.   Neurological: Negative.    Psychiatric/Behavioral: Negative.            Personal History     Past Medical History:   Past Medical History:   Diagnosis Date   • CAD (coronary artery disease)    • Tobacco abuse        Surgical History:      Past Surgical History:   Procedure Laterality Date   • APPENDECTOMY     • COLONOSCOPY             Family History: family history includes Coronary artery disease in his father; Heart disease in his mother. Otherwise pertinent FHx was reviewed and unremarkable.     Social History:  reports that he has been smoking cigarettes. He has a 45.00 pack-year smoking history. He has never used smokeless tobacco. He reports that he drinks alcohol. He reports that he does not use drugs.      Medications:  Prior to Admission medications    Not on File       Allergies:  No Known Allergies    Objective   Objective     Vital Signs  Temp:  [97.6 °F (36.4 °C)-98.1 °F (36.7 °C)] 98.1 °F (36.7 °C)  Heart Rate:  [49-64] 49  Resp:  [16-19] 16  BP: (118-146)/(70-83) 123/73  SpO2:  [93 %-99 %] 97 %  on   ;   Device (Oxygen Therapy): room air  Body mass index is 19.01 kg/m².    Physical Exam   Constitutional: He is oriented to person, place, and time. He appears well-developed and well-nourished. No distress.   HENT:   Head: Normocephalic and atraumatic.   Right Ear: External ear normal.   Left Ear: External ear normal.   Nose: Nose normal.   Eyes: Conjunctivae and EOM are normal.   Neck: Normal range of motion. JVD present.   Cardiovascular: Normal rate, regular rhythm, normal heart sounds and intact distal pulses.   Pulmonary/Chest: Effort normal and breath sounds normal.   Abdominal: Soft. Bowel sounds are normal. There is no tenderness.   Musculoskeletal: Normal range of motion.   Neurological: He is alert and oriented to person, place, and time.   Skin: Skin is warm and dry.   Psychiatric: He has a normal mood and affect. His behavior is normal. Judgment and thought content normal.    Vitals reviewed.      Results Review:  I have personally reviewed most recent cardiac tracings, lab results and radiology images and interpretations and agree with findings, most notably: Creatinine, troponin, CT of abdomen and EKG.    Results from last 7 days   Lab Units 03/21/20 0257 03/20/20 1911   WBC 10*3/mm3  --   --  9.40   HEMOGLOBIN g/dL  --   --  17.1   HEMATOCRIT %  --   --  48.3   PLATELETS 10*3/mm3  --   --  189   INR  1.11*   < >  --     < > = values in this interval not displayed.     Results from last 7 days   Lab Units 03/21/20 0257 03/20/20 1911   SODIUM mmol/L 140 140   POTASSIUM mmol/L 4.3 3.9   CHLORIDE mmol/L 103 102   CO2 mmol/L 26.0 24.0   BUN mg/dL 12 10   CREATININE mg/dL 1.81* 1.46*   GLUCOSE mg/dL 129* 118*   CALCIUM mg/dL 9.3 10.2   ALT (SGPT) U/L 25 22   AST (SGOT) U/L 28 20   TROPONIN T ng/mL <0.010 <0.010   PROBNP pg/mL 763.1  --      Estimated Creatinine Clearance: 35.7 mL/min (A) (by C-G formula based on SCr of 1.81 mg/dL (H)).  Brief Urine Lab Results  (Last result in the past 365 days)      Color   Clarity   Blood   Leuk Est   Nitrite   Protein   CREAT   Urine HCG        03/20/20 1915 Dark Yellow Clear Negative Negative Negative 30 mg/dL (1+)               Microbiology Results (last 10 days)     ** No results found for the last 240 hours. **          ECG/EMG Results (most recent)     Procedure Component Value Units Date/Time    ECG 12 Lead [[id number]] Collected:  03/20/20 1928     Updated:  03/20/20 1930    Narrative:       HEART RATE= 54  bpm  RR Interval= 1120  ms  NM Interval= 138  ms  P Horizontal Axis= -26  deg  P Front Axis= 57  deg  QRSD Interval= 99  ms  QT Interval= 450  ms  QRS Axis= -73  deg  T Wave Axis= 88  deg  - ABNORMAL ECG -  Pacemaker spikes or artifacts  Sinus bradycardia  Probable left atrial enlargement  Left anterior fascicular block  Anterolateral infarct, age indeterminate  Abnormal T, consider ischemia, lateral leads  Electronically Signed By:    Date and Time of Study: 2020-03-20 19:28:53                    Ct Abdomen Pelvis With Contrast    Addendum Date: 3/20/2020    This is an addendum to the CT abdomen pelvis from 03/20/2020. There are at least 2 areas in the apical portion of the lumen of the left ventricle suggesting possible thrombi. This was discussed with Dr. Sandra and Dr Dixon.  Electronically Signed ByDex Aguirre On:3/20/2020 10:21 PM This report was finalized on [id number] by  Alissa Aguirre, .    Result Date: 3/20/2020   1. The appearance of the left kidney is consistent with almost complete infarction. There is evidence for occlusion of the left renal artery about 1 cm from its origin. 2. The appearance of the left renal vein is most likely due to decreased flow from the arterial obstruction. 3. Suspect a nonoccluding thrombus in the left gonadal vein, which empties into the portal vein.. 4. Findings were discussed with Dr. Dixon by myself.  Electronically Signed ByDex Aguirre On:3/20/2020 9:31 PM This report was finalized on [id number] by  Alissa Aguirre, .        Estimated Creatinine Clearance: 35.7 mL/min (A) (by C-G formula based on SCr of 1.81 mg/dL (H)).    Assessment/Plan   Assessment/Plan       Active Hospital Problems    Diagnosis  POA   • **Kidney infarction (CMS/HCC) [N28.0]  Yes     Priority: High   • CAD (coronary artery disease) [I25.10]  Yes     Priority: Medium   • Tobacco abuse [Z72.0]  Yes     Priority: Low      Resolved Hospital Problems   No resolved problems to display.     1.  Kidney infarction due to renal artery occlusion  -CT of the abdomen shows the appearance of the left kidney consistent with complete infarction and evidence of occlusion of the left renal artery.  Decreased flow in the left renal vein as well as a nonoccluding thrombus in the left gonadal vein is also reported  -Vascular surgery, urology and interventional radiology consulted in ED  -Heparin started in ED, continue  -JOSE MANUEL noted addressed  "as below  -Monitor I's and O's    2.  JOSE MANUEL  -Creatinine: 1.46, BUN: 10, EGFR: 50  -Likely secondary to prerenal cause and IV dye noted above  -Monitor BMP  -Avoid nephrotoxic medication and further IV dye unless urgently needed  -Patient may benefit from nephrology consult   -Continue LR at 125 an hour started in ED    3.  CAD  -Patient has a history of CAD status post stenting states he is supposed to \"take some medicine\" but that he is noncompliant.  Patient does report some radiation of his flank and abdominal pain into his chest  -Troponin less than 0.010, trend  -EKG shows sinus bradycardia without obvious ST changes or ectopy  -Patient currently on heparin as above, continue  -Continue cardiac monitoring    4.  Tobacco abuse  -Patient states he is currently 1-1/2 pack a day smoker  -Discussed cessation especially in light of patient's comorbid conditions  -Nicotine patch ordered            VTE Prophylaxis -patient currently on heparin  Mechanical Order History:     None      Pharmalogical Order History:     Ordered     Dose Route Frequency Stop    03/20/20 2141  heparin bolus from bag 4,500 Units      80 Units/kg IV Once --    03/20/20 2141  heparin 49309 units/250 ml (100 units/ml) in D5W  10.2 mL/hr      18 Units/kg/hr IV Titrated --    03/20/20 2141  heparin bolus from bag 2,300 Units      40 Units/kg IV Every 6 Hours PRN --    03/20/20 2141  heparin bolus from bag 4,500 Units      80 Units/kg IV Every 6 Hours PRN --          CODE STATUS: Full  Code Status and Medical Interventions:   Ordered at: 03/20/20 2223     Level Of Support Discussed With:    Patient     Code Status:    CPR     Medical Interventions (Level of Support Prior to Arrest):    Full         I discussed the patient's findings and my recommendations with patient.        Electronically signed by Jarvis Alvarez PA-C, 03/20/20, 9:49 PM.  [facility]ist Team        "

## 2020-03-21 NOTE — PAYOR COMM NOTE
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  ---------------  [place]:  Vascular Disease GRG (MG-VAS)  Hospital admission is needed for appropriate care of the patient because of 1 or more of the following  Interventional revascularization (eg, surgery, thrombolysis) needed  Urgent inpatient anticoagulation needed (eg, close clinical monitoring necessary) as indicated by 1 or more of the following:  Thrombosis of organ at high risk of complications or organ failure (eg, mesenteric thrombosis, cerebral sinus thrombosis  -----------    AUTHORIZATION PENDING:   PLEASE FAX OR CALL DETERMINATION TO CONTACT BELOW:       THANK YOU,    ANDREA Lagos, RN  Utilization Review  [facility]  Phone: [phone]  Fax: [phone]      NPI: [id number]  Tax ID: [id number]        Damien Peña (59 y.o. Male)     Date of Birth Social Security Number Address Home Phone MRN    1960   [address] [place] [address] [phone] [id number]    Religious Marital Status          None Unknown       Admission Date Admission Type Admitting Provider Attending Provider Department, Room/Bed    3/20/20 Emergency Florecita Fuentes MD Nallapuram, Divya, MD [facility] 3C MEDICAL INPATIENT, 380/1    Discharge Date Discharge Disposition Discharge Destination                       Attending Provider:  Florecita Fuentes MD    Allergies:  No Known Allergies    Isolation:  None   Infection:  None   Code Status:  CPR    Ht:  172.7 cm (68\")   Wt:  56.7 kg (125 lb)    Admission Cmt:  None   Principal Problem:  Kidney infarction (CMS/HCC) [N28.0]                 Active Insurance as of 3/20/2020     Primary Coverage     Payor Plan Insurance Group Employer/Plan Group    ANTH [employer] ANTH [employer] BLUE SHIELD PPO [id number]     Payor Plan Address Payor Plan Phone Number Payor Plan Fax Number " Effective Dates    [address] [phone]  2019 - None Entered    [facility] [id number]       Subscriber Name Subscriber Birth Date Member ID       PAT PEÑA 1960 [id number]                 Emergency Contacts      (Rel.) Home Phone Work Phone Mobile Phone    MEL RASHID (Son) -- -- [phone]               History & Physical      Jarvis Alvarez PA-C at 20 214                [facility] Hospital Medicine Services      Patient Name: Pat Peña  : 1960  MRN: [id number]  Primary Care Physician: Provider, No Known  Date of admission: 3/20/2020    Patient Care Team:  Provider, No Known as PCP - General          Subjective   History Present Illness     Chief Complaint:   Chief Complaint   Patient presents with   • Flank Pain       Mr. Peña is a 59 y.o. male with past medical history of CAD status post stenting and tobacco abuse who presents to [facility] complaining of left-sided flank and back pain.  Patient states that approximately 1300 hrs. on 3/20/2020 following p.o. intake he began experiencing an aching pain in his left flank radiating to his left back rated at 9/10 made worse with movement.  In addition to the constant aching pain patient does report intermittent sharp pains.  Some associated nausea and vomiting and a burning sensation in his chest are reported however patient denies any shortness of air, cough, fever, changes in bowel or bladder habits or recent sick contacts.    In the ED patient found to have labs significant for troponin less than 0.010, creatinine: 1.46, remainder of CMP and CBC generally unremarkable.  UA shows trace ketones with 1+ protein, 0-2 RBCs and 3-5 WBCs.  CT of the abdomen shows the appearance of the left kidney consistent with complete infarction and evidence of occlusion of the left renal artery.  Decreased flow in the left renal vein as well as a nonoccluding thrombus in the left gonadal vein  is also reported.  EKG shows sinus bradycardia at 84 without obvious ST changes or ectopy.         History of Present Illness    Review of Systems   Constitution: Negative.   HENT: Negative.    Eyes: Negative.    Cardiovascular: Positive for chest pain.   Respiratory: Negative.    Endocrine: Negative.    Skin: Negative.    Gastrointestinal: Positive for abdominal pain, nausea and vomiting.   Genitourinary: Positive for flank pain.   Neurological: Negative.    Psychiatric/Behavioral: Negative.            Personal History     Past Medical History:   Past Medical History:   Diagnosis Date   • CAD (coronary artery disease)    • Tobacco abuse        Surgical History:      Past Surgical History:   Procedure Laterality Date   • APPENDECTOMY     • COLONOSCOPY             Family History: family history includes Coronary artery disease in his father; Heart disease in his mother. Otherwise pertinent FHx was reviewed and unremarkable.     Social History:  reports that he has been smoking cigarettes. He has a 45.00 pack-year smoking history. He has never used smokeless tobacco. He reports that he drinks alcohol. He reports that he does not use drugs.      Medications:  Prior to Admission medications    Not on File       Allergies:  No Known Allergies    Objective   Objective     Vital Signs  Temp:  [97.6 °F (36.4 °C)-98.1 °F (36.7 °C)] 98.1 °F (36.7 °C)  Heart Rate:  [49-64] 49  Resp:  [16-19] 16  BP: (118-146)/(70-83) 123/73  SpO2:  [93 %-99 %] 97 %  on   ;   Device (Oxygen Therapy): room air  Body mass index is 19.01 kg/m².    Physical Exam   Constitutional: He is oriented to person, place, and time. He appears well-developed and well-nourished. No distress.   HENT:   Head: Normocephalic and atraumatic.   Right Ear: External ear normal.   Left Ear: External ear normal.   Nose: Nose normal.   Eyes: Conjunctivae and EOM are normal.   Neck: Normal range of motion. JVD present.   Cardiovascular: Normal rate, regular rhythm, normal  heart sounds and intact distal pulses.   Pulmonary/Chest: Effort normal and breath sounds normal.   Abdominal: Soft. Bowel sounds are normal. There is no tenderness.   Musculoskeletal: Normal range of motion.   Neurological: He is alert and oriented to person, place, and time.   Skin: Skin is warm and dry.   Psychiatric: He has a normal mood and affect. His behavior is normal. Judgment and thought content normal.   Vitals reviewed.      Results Review:  I have personally reviewed most recent cardiac tracings, lab results and radiology images and interpretations and agree with findings, most notably: Creatinine, troponin, CT of abdomen and EKG.    Results from last 7 days   Lab Units 03/21/20 0257 03/20/20 1911   WBC 10*3/mm3  --   --  9.40   HEMOGLOBIN g/dL  --   --  17.1   HEMATOCRIT %  --   --  48.3   PLATELETS 10*3/mm3  --   --  189   INR  1.11*   < >  --     < > = values in this interval not displayed.     Results from last 7 days   Lab Units 03/21/20 0257 03/20/20 1911   SODIUM mmol/L 140 140   POTASSIUM mmol/L 4.3 3.9   CHLORIDE mmol/L 103 102   CO2 mmol/L 26.0 24.0   BUN mg/dL 12 10   CREATININE mg/dL 1.81* 1.46*   GLUCOSE mg/dL 129* 118*   CALCIUM mg/dL 9.3 10.2   ALT (SGPT) U/L 25 22   AST (SGOT) U/L 28 20   TROPONIN T ng/mL <0.010 <0.010   PROBNP pg/mL 763.1  --      Estimated Creatinine Clearance: 35.7 mL/min (A) (by C-G formula based on SCr of 1.81 mg/dL (H)).  Brief Urine Lab Results  (Last result in the past 365 days)      Color   Clarity   Blood   Leuk Est   Nitrite   Protein   CREAT   Urine HCG        03/20/20 1915 Dark Yellow Clear Negative Negative Negative 30 mg/dL (1+)               Microbiology Results (last 10 days)     ** No results found for the last 240 hours. **          ECG/EMG Results (most recent)     Procedure Component Value Units Date/Time    ECG 12 Lead [[id number]] Collected:  03/20/20 1928     Updated:  03/20/20 1930    Narrative:       HEART RATE= 54  bpm  RR Interval= 1120   ms  MN Interval= 138  ms  P Horizontal Axis= -26  deg  P Front Axis= 57  deg  QRSD Interval= 99  ms  QT Interval= 450  ms  QRS Axis= -73  deg  T Wave Axis= 88  deg  - ABNORMAL ECG -  Pacemaker spikes or artifacts  Sinus bradycardia  Probable left atrial enlargement  Left anterior fascicular block  Anterolateral infarct, age indeterminate  Abnormal T, consider ischemia, lateral leads  Electronically Signed By:   Date and Time of Study: 2020-03-20 19:28:53                    Ct Abdomen Pelvis With Contrast    Addendum Date: 3/20/2020    This is an addendum to the CT abdomen pelvis from 03/20/2020. There are at least 2 areas in the apical portion of the lumen of the left ventricle suggesting possible thrombi. This was discussed with Dr. Sandra and Dr Dixon.  Electronically Signed ByDex Aguirre On:3/20/2020 10:21 PM This report was finalized on [id number] by  Alissa Aguirre, .    Result Date: 3/20/2020   1. The appearance of the left kidney is consistent with almost complete infarction. There is evidence for occlusion of the left renal artery about 1 cm from its origin. 2. The appearance of the left renal vein is most likely due to decreased flow from the arterial obstruction. 3. Suspect a nonoccluding thrombus in the left gonadal vein, which empties into the portal vein.. 4. Findings were discussed with Dr. Dixon by myself.  Electronically Signed ByDex Aguirre On:3/20/2020 9:31 PM This report was finalized on [id number] by  Alissa Aguirre, .        Estimated Creatinine Clearance: 35.7 mL/min (A) (by C-G formula based on SCr of 1.81 mg/dL (H)).    Assessment/Plan   Assessment/Plan       Active Hospital Problems    Diagnosis  POA   • **Kidney infarction (CMS/HCC) [N28.0]  Yes     Priority: High   • CAD (coronary artery disease) [I25.10]  Yes     Priority: Medium   • Tobacco abuse [Z72.0]  Yes     Priority: Low      Resolved Hospital Problems   No resolved problems to display.     1.  Kidney infarction due to  "renal artery occlusion  -CT of the abdomen shows the appearance of the left kidney consistent with complete infarction and evidence of occlusion of the left renal artery.  Decreased flow in the left renal vein as well as a nonoccluding thrombus in the left gonadal vein is also reported  -Vascular surgery, urology and interventional radiology consulted in ED  -Heparin started in ED, continue  -JOSE MANUEL noted addressed as below  -Monitor I's and O's    2.  JOSE MANUEL  -Creatinine: 1.46, BUN: 10, EGFR: 50  -Likely secondary to prerenal cause and IV dye noted above  -Monitor BMP  -Avoid nephrotoxic medication and further IV dye unless urgently needed  -Patient may benefit from nephrology consult   -Continue LR at 125 an hour started in ED    3.  CAD  -Patient has a history of CAD status post stenting states he is supposed to \"take some medicine\" but that he is noncompliant.  Patient does report some radiation of his flank and abdominal pain into his chest  -Troponin less than 0.010, trend  -EKG shows sinus bradycardia without obvious ST changes or ectopy  -Patient currently on heparin as above, continue  -Continue cardiac monitoring    4.  Tobacco abuse  -Patient states he is currently 1-1/2 pack a day smoker  -Discussed cessation especially in light of patient's comorbid conditions  -Nicotine patch ordered            VTE Prophylaxis -patient currently on heparin  Mechanical Order History:     None      Pharmalogical Order History:     Ordered     Dose Route Frequency Stop    03/20/20 2141  heparin bolus from bag 4,500 Units      80 Units/kg IV Once --    03/20/20 2141  heparin 57425 units/250 ml (100 units/ml) in D5W  10.2 mL/hr      18 Units/kg/hr IV Titrated --    03/20/20 2141  heparin bolus from bag 2,300 Units      40 Units/kg IV Every 6 Hours PRN --    03/20/20 2141  heparin bolus from bag 4,500 Units      80 Units/kg IV Every 6 Hours PRN --          CODE STATUS: Full  Code Status and Medical Interventions:   Ordered at: " 03/20/20 2223     Level Of Support Discussed With:    Patient     Code Status:    CPR     Medical Interventions (Level of Support Prior to Arrest):    Full         I discussed the patient's findings and my recommendations with patient.        Electronically signed by Jarvis Alvarez PA-C, 03/20/20, 9:49 PM.  [facility] Hospitalist Team          Electronically signed by Jarvis Alvarez PA-C at 03/21/20 0440          Emergency Department Notes      Yvette Louise, RN at 03/20/20 1852        Pt c/o L flank pain since this AM, denies any urinary problems      Yvette Louise RN  03/20/20 1852      Electronically signed by Yvette Louise RN at 03/20/20 1852     Monica Marsh LPN at 03/20/20 2030        Patient actively vomiting after phenergan administration.     Monica Marsh LPN  03/20/20 2030      Electronically signed by Monica Marsh LPN at 03/20/20 2030     Wellington Dixon MD at 03/20/20 2145          Subjective   History of Present Illness  Context: 59-year-old male presents with left flank pain nausea vomiting.  He states that started this morning after eating waffles.  He reports this to be around 1:00pm.  He states the pain hit him suddenly.  He has had pain ever since then.  He has had no urinary difficulty.  He has never had pain like this before.  He has had associated nausea vomiting.  Location: Left flank  Quality: Pain  Duration: Onset around 1:00  Timing: Constant  Severity: Severe although has eased a little bit   Modifying factors:  Associated signs and symptoms: Nausea vomiting    Review of Systems   Constitutional: Negative for fever and unexpected weight change.   HENT: Negative for congestion and sore throat.    Eyes: Negative for pain.   Respiratory: Negative for cough and shortness of breath.    Cardiovascular: Positive for chest pain. Negative for leg swelling.        Reports he had some mild chest pain after arrival to the emergency department    Gastrointestinal: Positive for nausea and vomiting. Negative for abdominal pain and diarrhea.   Genitourinary: Negative for dysuria and urgency.   Musculoskeletal: Positive for back pain.   Skin: Negative for rash.   Neurological: Negative for dizziness, weakness and headaches.   Psychiatric/Behavioral: Negative for confusion.       No past medical history on file.  Coronary disease stents x2 hyperlipidemia  No Known Allergies    No past surgical history on file.    No family history on file.  Social history does smoke  Social History     Socioeconomic History   • Marital status: Unknown     Spouse name: Not on file   • Number of children: Not on file   • Years of education: Not on file   • Highest education level: Not on file     Reports taking no medications      Objective   Physical Exam  59-year-old male awake alert.  Generally well-developed well-nourished.  Pupils equal round react light.  Oropharynx clear neck supple chest clear equal breath sounds cardiovascular regular rate and rhythm abdomen is soft positive bowel sounds some mild left side abdominal tenderness no rebound or guarding.  He complains of pain palpation of left flank.  Extremities are neurovascular grossly intact without tenderness edema.  Skin without rash noted.  Procedures          ED Course  ED Course as of Mar 20 2145   Fri Mar 20, 2020   1933 Patient presents to the ED with a complaint of left flank pain since this morning.  He denies any urinary symptoms.  He describes this is a sharp pain.  It is waxing and waning.  Patient also had an episode of vomiting while here in the [facility].  Also while here he complained of chest pain, an EKG was performed.  Patient does have a history of an MI.  Patient will be transferred to the main emergency department for cardiac monitoring and further evaluation.    Ordered CBC, CMP, lipase and urinalysis.  Added on troponin due to onset of chest pain.  Will order CT scan abdomen pelvis.    [LB]      ED  Course User Index  [LB] Hodan Herbert, TRUPTI      Results for orders placed or performed during the hospital encounter of 03/20/20   Comprehensive Metabolic Panel   Result Value Ref Range    Glucose 118 (H) 65 - 99 mg/dL    BUN 10 6 - 20 mg/dL    Creatinine 1.46 (H) 0.76 - 1.27 mg/dL    Sodium 140 136 - 145 mmol/L    Potassium 3.9 3.5 - 5.2 mmol/L    Chloride 102 98 - 107 mmol/L    CO2 24.0 22.0 - 29.0 mmol/L    Calcium 10.2 8.6 - 10.5 mg/dL    Total Protein 7.0 6.0 - 8.5 g/dL    Albumin 4.30 3.50 - 5.20 g/dL    ALT (SGPT) 22 1 - 41 U/L    AST (SGOT) 20 1 - 40 U/L    Alkaline Phosphatase 69 39 - 117 U/L    Total Bilirubin 0.8 0.2 - 1.2 mg/dL    eGFR Non African Amer 50 (L) >60 mL/min/1.73    Globulin 2.7 gm/dL    A/G Ratio 1.6 g/dL    BUN/Creatinine Ratio 6.8 (L) 7.0 - 25.0    Anion Gap 14.0 5.0 - 15.0 mmol/L   Lipase   Result Value Ref Range    Lipase 17 13 - 60 U/L   Urinalysis With Culture If Indicated - Urine, Clean Catch   Result Value Ref Range    Color, UA Dark Yellow (A) Yellow, Straw    Appearance, UA Clear Clear    pH, UA 6.0 5.0 - 8.0    Specific Gravity, UA 1.021 1.005 - 1.030    Glucose, UA Negative Negative    Ketones, UA Trace (A) Negative    Bilirubin, UA Negative Negative    Blood, UA Negative Negative    Protein, UA 30 mg/dL (1+) (A) Negative    Leuk Esterase, UA Negative Negative    Nitrite, UA Negative Negative    Urobilinogen, UA 0.2 E.U./dL 0.2 - 1.0 E.U./dL   CBC Auto Differential   Result Value Ref Range    WBC 9.40 3.40 - 10.80 10*3/mm3    RBC 5.15 4.14 - 5.80 10*6/mm3    Hemoglobin 17.1 13.0 - 17.7 g/dL    Hematocrit 48.3 37.5 - 51.0 %    MCV 93.8 79.0 - 97.0 fL    MCH 33.3 (H) 26.6 - 33.0 pg    MCHC 35.5 31.5 - 35.7 g/dL    RDW 12.7 12.3 - 15.4 %    RDW-SD 42.0 37.0 - 54.0 fl    MPV 9.2 6.0 - 12.0 fL    Platelets 189 140 - 450 10*3/mm3    Neutrophil % 80.2 (H) 42.7 - 76.0 %    Lymphocyte % 15.7 (L) 19.6 - 45.3 %    Monocyte % 3.2 (L) 5.0 - 12.0 %    Eosinophil % 0.3 0.3 - 6.2 %     Basophil % 0.6 0.0 - 1.5 %    Neutrophils, Absolute 7.60 (H) 1.70 - 7.00 10*3/mm3    Lymphocytes, Absolute 1.50 0.70 - 3.10 10*3/mm3    Monocytes, Absolute 0.30 0.10 - 0.90 10*3/mm3    Eosinophils, Absolute 0.00 0.00 - 0.40 10*3/mm3    Basophils, Absolute 0.10 0.00 - 0.20 10*3/mm3    nRBC 0.9 (H) 0.0 - 0.2 /100 WBC   Urinalysis, Microscopic Only - Urine, Clean Catch   Result Value Ref Range    RBC, UA 0-2 (A) None Seen /HPF    WBC, UA 3-5 (A) None Seen /HPF    Bacteria, UA None Seen None Seen /HPF    Squamous Epithelial Cells, UA None Seen None Seen, 0-2 /HPF    Hyaline Casts, UA 3-6 None Seen /LPF    Methodology Automated Microscopy    Troponin   Result Value Ref Range    Troponin T <0.010 0.000 - 0.030 ng/mL   Scan Slide   Result Value Ref Range    RBC Morphology Normal Normal    WBC Morphology Normal Normal    Platelet Morphology Normal Normal   Gold Top - SST   Result Value Ref Range    Extra Tube Hold for add-ons.      Ct Abdomen Pelvis With Contrast    Result Date: 3/20/2020   1. The appearance of the left kidney is consistent with almost complete infarction. There is evidence for occlusion of the left renal artery about 1 cm from its origin. 2. The appearance of the left renal vein is most likely due to decreased flow from the arterial obstruction. 3. Suspect a nonoccluding thrombus in the left gonadal vein, which empties into the portal vein.. 4. Findings were discussed with Dr. Dixon by myself.  Electronically Signed By-Alissa Aguirre On:3/20/2020 9:31 PM This report was finalized on [id number] by  Alissa Aguirre, .    Medications   sodium chloride 0.9 % flush 10 mL (has no administration in time range)   sodium chloride 0.9 % infusion  - ADS Override Pull (has no administration in time range)   heparin bolus from bag 4,500 Units (has no administration in time range)   heparin 84254 units/250 ml (100 units/ml) in D5W (has no administration in time range)   heparin bolus from bag 2,300 Units (has no  "administration in time range)   heparin bolus from bag 4,500 Units (has no administration in time range)   lactated ringers infusion (has no administration in time range)   ondansetron (ZOFRAN) injection 4 mg (4 mg Intravenous Given 3/20/20 1910)   promethazine (PHENERGAN) injection 12.5 mg (12.5 mg Intramuscular Given 3/20/20 1950)   lactated ringers bolus 1,000 mL (0 mL Intravenous Stopped 3/20/20 2144)   ketorolac (TORADOL) injection 15 mg (15 mg Intravenous Given 3/20/20 2052)   iopamidol (ISOVUE-370) 76 % injection 100 mL (100 mL Intravenous Given 3/20/20 2049)     /74   Pulse 59   Temp 97.6 °F (36.4 °C)   Resp 18   Ht 172.7 cm (68\")   Wt 56.7 kg (125 lb)   SpO2 97%   BMI 19.01 kg/m²                                         MDM  Number of Diagnoses or Management Options  Flank pain:   Intractable vomiting with nausea, unspecified vomiting type:   Kidney infarction (CMS/[facility]):   Critical Care  Total time providing critical care: 30-74 minutes    Chart review: No previous records available  Comorbidity: Coronary disease hyperlipidemia noncompliance tobacco use  Differential: UTI, renal colic, ureterolithiasis  My EKG interpretation: Sinus rhythm with anterolateral infarct age indeterminate with repolarization abnormality.  No previous to compare to  Lab: Urinalysis 0-2 red cells 3-5 white cells no bacteria troponin negative lipase normal comprehensive metabolic panel glucose 118 creatinine 1.46, CBC normal white count hemoglobin 17.1 platelet count 189  Radiology: I reviewed CT scan of abdomen pelvis IV contrast and discussed with radiologist.  The appearance of the left kidney consistent with almost complete infarction with evidence of occlusion of the left renal artery about 1 cm from its origin.  Appearance the left renal vein is not well opacified however it was felt that this is most likely from decreased flow from arterial obstruction.  There is suspicion of a nonoccluding thrombus in the left " gonadal vein which empties into the portal vein.  Discussion/treatment: Patient received IV fluid bolus.  He was on IV fluids.  He was given Toradol and Zofran.  He had improvement in his pain with treatment.  He was started on high-dose heparin infusion.  Patient was discussed with PA for the hospitalist.  He was discussed with Dr. Luna for urology and Dr.Jung baron surgeon.  He was also discussed with Dr. mohan interventional radiology who reviewed CT scan.  He had felt that this was an embolic process.  He thought that there likely was a clot in the LV apex.  He did not feel that there was any intervention that would benefit at this point he agreed with anticoagulation which had already been initiated.  He will require echocardiogram in a.m.  Patient critical care time of 30 minutes independent of procedures.    Final diagnoses:   Kidney infarction (CMS/HCC)   Flank pain   Intractable vomiting with nausea, unspecified vomiting type            Wellington Dixon MD  03/20/20 2216       Wellington Dixon MD  03/20/20 2216      Electronically signed by Wellington Dixon MD at 03/20/20 2216         Facility-Administered Medications as of 3/21/2020   Medication Dose Route Frequency Provider Last Rate Last Dose   • acetaminophen (TYLENOL) tablet 650 mg  650 mg Oral Q4H PRN Jarvis Alvarez PA-C        Or   • acetaminophen (TYLENOL) 160 MG/5ML solution 650 mg  650 mg Oral Q4H PRN Jarvis Alvarez PA-C        Or   • acetaminophen (TYLENOL) suppository 650 mg  650 mg Rectal Q4H PRN Jarvis Alvarez PA-C       • calcium gluconate 1g/50ml 0.675% NaCl IV SOLN  1 g Intravenous PRN Jarvis Alvarez PA-C        And   • calcium gluconate 2-0.675 GM/100ML NACL IVPB  2 g Intravenous PRN Jarvis Alvarez PA-C       • docusate sodium (COLACE) capsule 100 mg  100 mg Oral BID PRN Jarvis Alvarez PA-C       • heparin 11294 units/250 ml (100 units/ml) in D5W  18 Units/kg/hr Intravenous Titrated Antonio  VARGAS Conley 9.07 mL/hr at 03/21/20 0426 16 Units/kg/hr at 03/21/20 0426   • heparin bolus from bag 2,300 Units  40 Units/kg Intravenous Q6H PRN Jarvis Alvarez PA-C       • [COMPLETED] heparin bolus from bag 4,500 Units  80 Units/kg Intravenous Once Wellington Dixon MD   4,500 Units at 03/20/20 2157   • heparin bolus from bag 4,500 Units  80 Units/kg Intravenous Q6H PRN Jarvis Alvarez PA-C       • influenza vac split quad (FLUZONE,FLUARIX,AFLURIA) injection 0.5 mL  0.5 mL Intramuscular During Hospitalization Jarvis Alvarez PA-C       • [COMPLETED] iopamidol (ISOVUE-370) 76 % injection 100 mL  100 mL Intravenous Once in imaging Wellington Dixon MD   100 mL at 03/20/20 2049   • ketamine (KETALAR) 17 mg in sodium chloride 0.9 % 100 mL infusion  0.3 mg/kg Intravenous Daily PRN Jarvis Alvarez PA-C       • [COMPLETED] ketorolac (TORADOL) injection 15 mg  15 mg Intravenous Once Wellington Dixon MD   15 mg at 03/20/20 2052   • [COMPLETED] lactated ringers bolus 1,000 mL  1,000 mL Intravenous Once Wellington Dixon MD   Stopped at 03/20/20 2144   • lactated ringers infusion  125 mL/hr Intravenous Continuous Jarvis Alvarez PA-C 125 mL/hr at 03/21/20 0425 125 mL/hr at 03/21/20 0425   • [COMPLETED] LORazepam (ATIVAN) injection 0.5 mg  0.5 mg Intravenous Once Jarvis Alvarez PA-C   0.5 mg at 03/21/20 0423   • Magnesium Sulfate 2 gram Bolus, followed by 8 gram infusion (total Mg dose 10 grams)- Mg less than or equal to 1mg/dL  2 g Intravenous PRN Jarvis Alvarez PA-C        Or   • Magnesium Sulfate 2 gram / 50mL Infusion (GIVE X 3 BAGS TO EQUAL 6GM TOTAL DOSE) - Mg 1.1 - 1.5 mg/dl  2 g Intravenous PRN Jarvis Alvarez PA-C        Or   • Magnesium Sulfate 4 gram infusion- Mg 1.6-1.9 mg/dL  4 g Intravenous PRN Jarvis Alvarez PA-C       • melatonin tablet 5 mg  5 mg Oral Nightly PRN Jarvis Alvarez PA-C       • Morphine sulfate (PF) injection 2 mg  2 mg Intravenous Q4H  PRN Jarvis Alvarez PA-C   2 mg at 20 0112   • [COMPLETED] Morphine sulfate (PF) injection 2 mg  2 mg Intravenous Once Jarvis Alvarez PA-C   2 mg at 20 0423   • nitroglycerin (NITROSTAT) SL tablet 0.4 mg  0.4 mg Sublingual Q5 Min PRN Jarvis Alvarez PA-C       • [COMPLETED] ondansetron (ZOFRAN) injection 4 mg  4 mg Intravenous Once Wellington Dixon MD   4 mg at 20 1910   • ondansetron (ZOFRAN) tablet 4 mg  4 mg Oral Q6H PRN Jarvis Alvarez PA-C        Or   • ondansetron (ZOFRAN) injection 4 mg  4 mg Intravenous Q6H PRN Jarvis Alvarez PA-C   4 mg at 20 011   • potassium & sodium phosphates (PHOS-NAK) 280-160-250 MG packet - for Phosphorus less than 1.25 mg/dL  2 packet Oral Q6H PRN Jarvis Alvarez PA-C        Or   • potassium & sodium phosphates (PHOS-NAK) 280-160-250 MG packet - for Phosphorus 1.25 - 2.5 mg/dL  2 packet Oral Q6H PRN Jarvis Alvarez PA-C       • potassium chloride (K-DUR,KLOR-CON) CR tablet 40 mEq  40 mEq Oral PRN Jarvis Alvarez PA-C       • potassium chloride (KLOR-CON) packet 40 mEq  40 mEq Oral PRN Jarvis Alvarez PA-C       • [COMPLETED] promethazine (PHENERGAN) injection 12.5 mg  12.5 mg Intramuscular Once Hodan Herbert APRN   12.5 mg at 20 1950   • sodium chloride 0.9 % flush 10 mL  10 mL Intravenous PRN Jarvis Alvarez PA-C       • sodium chloride 0.9 % flush 10 mL  10 mL Intravenous Q12H Jarvis Alvarez PA-C       • sodium chloride 0.9 % flush 10 mL  10 mL Intravenous PRN Jarvis Alvarez PA-C       • [] sodium chloride 0.9 % infusion  - ADS Override Pull            • [COMPLETED] Sulfur Hexafluoride Microsph 60.7-25 MG reconstituted suspension 2 mL  2 mL Intravenous Once Florecita Fuentes MD   2 mL at 20 1230       Imaging Results (Last 24 Hours)     Procedure Component Value Units Date/Time    CT Abdomen Pelvis With Contrast [[id number]] Collected:  20     Updated:   03/20/20 2225    Addenda:        This is an addendum to the CT abdomen pelvis from 03/20/2020. There are  at least 2 areas in the apical portion of the lumen of the left  ventricle suggesting possible thrombi. This was discussed with Dr. Sandra  and Dr Dixon.     Electronically Signed By-Alissa Aguirre On:3/20/2020 10:21 PM  This report was finalized on [id number] by  Alissa Aguirre, .  Signed:  03/20/20 2221 by Alissa Aguirre MD    Narrative:       CT ABDOMEN PELVIS W CONTRAST-     Date of Exam: 3/20/2020 8:35 PM     Indication: flank pain.  Left flank pain     Comparison: None available.     Technique: Contiguous axial CT images were obtained from the lung bases  to the superior iliac crests without contrast.  Following uneventful IV  administration of 100 Isovue-370 and oral contrast, contiguous axial  images obtained from lung bases to the pubic symphysis. Sagittal and  coronal reconstructions were performed.  Automated exposure control and  iterative reconstruction methods were used.     FINDINGS:  There is a small hiatal hernia. There is minor bandlike atelectasis in  the left lower lobe. No abnormality seen in the gallbladder. There is a  subcentimeter hypodense liver lesion consistent with a cyst. No  abnormality is seen in the adrenals, right kidney, spleen, pancreas, or  aorta.     There is absence of perfusion of most of the left kidney. There is a  parenchymal enhancement of part of the medial aspect of the left kidney.  The proximal part of the left renal artery is opacified;  about 1 cm  from its origin, the left renal artery appears roughly unopacified.  There is contrast in the left renal vein; the midportion of the left  renal vein is not opacified as well as the rest.     There is minor aortic atherosclerosis. The right renal artery is patent.  The superior mesenteric and celiac arteries appear patent.     There is a long linear defect in the left gonadal vein suggesting a  nonoccluding thrombus.  The left gonadal vein appears to drain into the  portal vein.      Parts of the stomach are contracted. Small bowel appears within normal.  The appendix is not identified and has reportedly been removed. Part of  the sigmoid is contracted. There is no free fluid or free air. No  ureteral stone. The bladder wall appears thickened, likely due to  incomplete distention.          Impression:          1. The appearance of the left kidney is consistent with almost complete  infarction. There is evidence for occlusion of the left renal artery  about 1 cm from its origin.  2. The appearance of the left renal vein is most likely due to decreased  flow from the arterial obstruction.  3. Suspect a nonoccluding thrombus in the left gonadal vein, which  empties into the portal vein..  4. Findings were discussed with Dr. Dixon by myself.     Electronically Signed By-Alissa Aguirre On:3/20/2020 9:31 PM  This report was finalized on [id number] by  Alissa Aguirre, .           Consult Notes (last 24 hours) (Notes from 20 1259 through 20 1259)      Noel Luna MD at 20 0728      Consult Orders    1. Urology (on-call MD unless specified) [[id number]] ordered by Wellington Dixon MD at 20 2141                Urology Consult Note    Patient:Damien Peña :1960  Room:[facility]  Admit Date3/  Age:59 y.o.     SEX:male     DOS:3/21/2020     MR:[id number]     Visit:[id number]       Attending: Florecita Fuentes MD  Referring Provider: Dr Fuentes  Reason for Consultation: Left renal infarct    Patient Care Team:  Provider, No Known as PCP - General    Chief complaint left flank pain    Subjective .     History of present illness: Patient is a 59-year-old white male with a history of coronary artery disease who is supposed to be on Plavix but has not taken any Plavix in at least the last 6 months.  He developed severe left flank pain radiating down to his groin area that started yesterday.  No  associated fevers.  I reviewed his CT scan which revealed a left renal infarct.  Patient is supposed to have an appointment with  sometime next week.  He does not have a family doctor    Review of Systems  12 point review of systems were reviewed and are negative except for what is in HPI.    History  Past Medical History:   Diagnosis Date   • CAD (coronary artery disease)    • Tobacco abuse      Past Surgical History:   Procedure Laterality Date   • APPENDECTOMY     • COLONOSCOPY       Social History     Socioeconomic History   • Marital status: Unknown     Spouse name: Not on file   • Number of children: Not on file   • Years of education: Not on file   • Highest education level: Not on file   Tobacco Use   • Smoking status: Current Every Day Smoker     Packs/day: 1.50     Years: 30.00     Pack years: 45.00     Types: Cigarettes   • Smokeless tobacco: Never Used   Substance and Sexual Activity   • Alcohol use: Yes     Comment: beer occasionally   • Drug use: Never   • Sexual activity: Defer     Family History   Problem Relation Age of Onset   • Heart disease Mother    • Coronary artery disease Father      No Known Allergies  Prior to Admission medications    Not on File         Objective     tMax 24 hours:  Temp (24hrs), Av.9 °F (36.6 °C), Min:97.6 °F (36.4 °C), Max:98.1 °F (36.7 °C)    Vital Sign Ranges:  Temp:  [97.6 °F (36.4 °C)-98.1 °F (36.7 °C)] 98.1 °F (36.7 °C)  Heart Rate:  [49-64] 49  Resp:  [16-19] 16  BP: (118-146)/(70-83) 123/73  Intake and Output Last 3 Shifts:  I/O last 3 completed shifts:  In: 1000 [IV Piggyback:1000]  Out: -       Physical Exam:     General Appearance:    Alert, cooperative, in no acute distress   Head:    Normocephalic, without obvious abnormality, atraumatic   Eyes:            Lids and lashes normal, conjunctivae and sclerae normal, no   icterus, no pallor, corneas clear, PERRLA   Ears:    Ears appear intact with no abnormalities noted   Throat:   No oral lesions, no  thrush, oral mucosa moist   Neck:   No adenopathy, supple, trachea midline, no thyromegaly, no   carotid bruit, no JVD   Back:     No kyphosis present, no scoliosis present, no skin lesions,      erythema or scars, no tenderness to percussion or                   palpation,   range of motion normal   Lungs:     Clear to auscultation,respirations regular, even and                  unlabored    Heart:    Regular rhythm and normal rate, normal S1 and S2, no            murmur, no gallop, no rub, no click   Chest Wall:    No abnormalities observed   Abdomen:     Normal bowel sounds, no masses, no organomegaly, soft        non-tender, non-distended, no guarding, no rebound                tenderness   Rectal:     Deferred   Extremities:   Moves all extremities well, no edema, no cyanosis, no             redness   Pulses:   Pulses palpable and equal bilaterally   Skin:   No bleeding, bruising or rash   Lymph nodes:   No palpable adenopathy   Neurologic:   Cranial nerves 2 - 12 grossly intact, sensation intact, DTR       present and equal bilaterally       Results Review:     Lab Results (last 24 hours)     Procedure Component Value Units Date/Time    CBC & Differential [[id number]] Collected:  03/21/20 0257    Specimen:  Blood Updated:  03/21/20 0452    Narrative:       The following orders were created for panel order CBC & Differential.  Procedure                               Abnormality         Status                     ---------                               -----------         ------                     CBC Auto Differential[[id number]]        Abnormal            Final result                 Please view results for these tests on the individual orders.    CBC Auto Differential [[id number]]  (Abnormal) Collected:  03/21/20 0257    Specimen:  Blood Updated:  03/21/20 0452     WBC 11.20 10*3/mm3      RBC 4.41 10*6/mm3      Hemoglobin 15.4 g/dL      Hematocrit 42.2 %      MCV 95.7 fL      MCH 34.9 pg      MCHC 36.4 g/dL       RDW 12.9 %      RDW-SD 42.9 fl      MPV 9.5 fL      Platelets 167 10*3/mm3      Neutrophil % 86.9 %      Lymphocyte % 10.8 %      Monocyte % 2.1 %      Eosinophil % 0.0 %      Basophil % 0.2 %      Neutrophils, Absolute 9.70 10*3/mm3      Lymphocytes, Absolute 1.20 10*3/mm3      Monocytes, Absolute 0.20 10*3/mm3      Eosinophils, Absolute 0.00 10*3/mm3      Basophils, Absolute 0.00 10*3/mm3      nRBC 0.1 /100 WBC     CK [[id number]]  (Normal) Collected:  03/21/20 0257    Specimen:  Blood Updated:  03/21/20 0418     Creatine Kinase 105 U/L     Comprehensive Metabolic Panel [[id number]]  (Abnormal) Collected:  03/21/20 0257    Specimen:  Blood Updated:  03/21/20 0418     Glucose 129 mg/dL      BUN 12 mg/dL      Creatinine 1.81 mg/dL      Sodium 140 mmol/L      Potassium 4.3 mmol/L      Chloride 103 mmol/L      CO2 26.0 mmol/L      Calcium 9.3 mg/dL      Total Protein 6.3 g/dL      Albumin 4.00 g/dL      ALT (SGPT) 25 U/L      AST (SGOT) 28 U/L      Alkaline Phosphatase 57 U/L      Total Bilirubin 1.0 mg/dL      eGFR Non African Amer 39 mL/min/1.73      Globulin 2.3 gm/dL      A/G Ratio 1.7 g/dL      BUN/Creatinine Ratio 6.6     Anion Gap 11.0 mmol/L     Narrative:       GFR Normal >60  Chronic Kidney Disease <60  Kidney Failure <15      Troponin [[id number]]  (Normal) Collected:  03/21/20 0257    Specimen:  Blood Updated:  03/21/20 0418     Troponin T <0.010 ng/mL     Narrative:       Troponin T Reference Range:  <= 0.03 ng/mL-   Negative for AMI  >0.03 ng/mL-     Abnormal for myocardial necrosis.  Clinicians would have to utilize clinical acumen, EKG, Troponin and serial changes to determine if it is an Acute Myocardial Infarction or myocardial injury due to an underlying chronic condition.       Results may be falsely decreased if patient taking Biotin.      Phosphorus [[id number]]  (Normal) Collected:  03/21/20 0257    Specimen:  Blood Updated:  03/21/20 0418     Phosphorus 3.7 mg/dL     Magnesium [[id number]]   (Normal) Collected:  03/21/20 0257    Specimen:  Blood Updated:  03/21/20 0417     Magnesium 2.0 mg/dL     BNP [[id number]]  (Normal) Collected:  03/21/20 0257    Specimen:  Blood Updated:  03/21/20 0412     proBNP 763.1 pg/mL     Narrative:       Among patients with dyspnea, NT-proBNP is highly sensitive for the detection of acute congestive heart failure. In addition NT-proBNP of <300 pg/ml effectively rules out acute congestive heart failure with 99% negative predictive value.    Results may be falsely decreased if patient taking Biotin.      Protime-INR [[id number]]  (Abnormal) Collected:  03/21/20 0257    Specimen:  Blood Updated:  03/21/20 0400     Protime 11.4 Seconds      INR 1.11    aPTT [[id number]]  (Abnormal) Collected:  03/21/20 0257    Specimen:  Blood Updated:  03/21/20 0400     PTT 85.3 seconds      Comment: Result checked        Protime-INR [[id number]]  (Normal) Collected:  03/20/20 2145    Specimen:  Blood Updated:  03/20/20 2217     Protime 11.2 Seconds      INR 1.08    aPTT [[id number]]  (Abnormal) Collected:  03/20/20 2145    Specimen:  Blood Updated:  03/20/20 2217     PTT 21.1 seconds     Extra Tubes [[id number]] Collected:  03/20/20 1911    Specimen:  Blood, Venous Line Updated:  03/20/20 2015    Narrative:       The following orders were created for panel order Extra Tubes.  Procedure                               Abnormality         Status                     ---------                               -----------         ------                     Gold Top - SST[[id number]]                                   Final result                 Please view results for these tests on the individual orders.    Gold Top - SST [[id number]] Collected:  03/20/20 1911    Specimen:  Blood Updated:  03/20/20 2015     Extra Tube Hold for add-ons.     Comment: Auto resulted.       CBC & Differential [[id number]] Collected:  03/20/20 1911    Specimen:  Blood Updated:  03/20/20 1952    Narrative:       The following orders  were created for panel order CBC & Differential.  Procedure                               Abnormality         Status                     ---------                               -----------         ------                     CBC Auto Differential[[id number]]        Abnormal            Final result                 Please view results for these tests on the individual orders.    CBC Auto Differential [[id number]]  (Abnormal) Collected:  03/20/20 1911    Specimen:  Blood Updated:  03/20/20 1952     WBC 9.40 10*3/mm3      RBC 5.15 10*6/mm3      Hemoglobin 17.1 g/dL      Hematocrit 48.3 %      MCV 93.8 fL      MCH 33.3 pg      MCHC 35.5 g/dL      RDW 12.7 %      RDW-SD 42.0 fl      MPV 9.2 fL      Platelets 189 10*3/mm3      Neutrophil % 80.2 %      Lymphocyte % 15.7 %      Monocyte % 3.2 %      Eosinophil % 0.3 %      Basophil % 0.6 %      Neutrophils, Absolute 7.60 10*3/mm3      Lymphocytes, Absolute 1.50 10*3/mm3      Monocytes, Absolute 0.30 10*3/mm3      Eosinophils, Absolute 0.00 10*3/mm3      Basophils, Absolute 0.10 10*3/mm3      nRBC 0.9 /100 WBC     Scan Slide [[id number]]  (Normal) Collected:  03/20/20 1911    Specimen:  Blood Updated:  03/20/20 1952     RBC Morphology Normal     WBC Morphology Normal     Platelet Morphology Normal    Narrative:       Slide Reviewed    Comprehensive Metabolic Panel [[id number]]  (Abnormal) Collected:  03/20/20 1911    Specimen:  Blood Updated:  03/20/20 1947     Glucose 118 mg/dL      BUN 10 mg/dL      Creatinine 1.46 mg/dL      Sodium 140 mmol/L      Potassium 3.9 mmol/L      Chloride 102 mmol/L      CO2 24.0 mmol/L      Calcium 10.2 mg/dL      Total Protein 7.0 g/dL      Albumin 4.30 g/dL      ALT (SGPT) 22 U/L      AST (SGOT) 20 U/L      Alkaline Phosphatase 69 U/L      Total Bilirubin 0.8 mg/dL      eGFR Non African Amer 50 mL/min/1.73      Globulin 2.7 gm/dL      A/G Ratio 1.6 g/dL      BUN/Creatinine Ratio 6.8     Anion Gap 14.0 mmol/L     Narrative:       GFR Normal  >60  Chronic Kidney Disease <60  Kidney Failure <15      Lipase [[id number]]  (Normal) Collected:  03/20/20 1911    Specimen:  Blood Updated:  03/20/20 1947     Lipase 17 U/L     Troponin [[id number]]  (Normal) Collected:  03/20/20 1911    Specimen:  Blood Updated:  03/20/20 1947     Troponin T <0.010 ng/mL     Narrative:       Troponin T Reference Range:  <= 0.03 ng/mL-   Negative for AMI  >0.03 ng/mL-     Abnormal for myocardial necrosis.  Clinicians would have to utilize clinical acumen, EKG, Troponin and serial changes to determine if it is an Acute Myocardial Infarction or myocardial injury due to an underlying chronic condition.       Results may be falsely decreased if patient taking Biotin.      Urinalysis With Culture If Indicated - Urine, Clean Catch [[id number]]  (Abnormal) Collected:  03/20/20 1915    Specimen:  Urine, Clean Catch Updated:  03/20/20 1926     Color, UA Dark Yellow     Appearance, UA Clear     pH, UA 6.0     Specific Gravity, UA 1.021     Glucose, UA Negative     Ketones, UA Trace     Bilirubin, UA Negative     Blood, UA Negative     Protein, UA 30 mg/dL (1+)     Leuk Esterase, UA Negative     Nitrite, UA Negative     Urobilinogen, UA 0.2 E.U./dL    Urinalysis, Microscopic Only - Urine, Clean Catch [[id number]]  (Abnormal) Collected:  03/20/20 1915    Specimen:  Urine, Clean Catch Updated:  03/20/20 1926     RBC, UA 0-2 /HPF      WBC, UA 3-5 /HPF      Bacteria, UA None Seen /HPF      Squamous Epithelial Cells, UA None Seen /HPF      Hyaline Casts, UA 3-6 /LPF      Methodology Automated Microscopy         No results found for: URINECX     Imaging Results (Last 7 Days)     Procedure Component Value Units Date/Time    CT Abdomen Pelvis With Contrast [[id number]] Collected:  03/20/20 2119     Updated:  03/20/20 2225    Addenda:        This is an addendum to the CT abdomen pelvis from 03/20/2020. There are  at least 2 areas in the apical portion of the lumen of the left  ventricle suggesting  possible thrombi. This was discussed with Dr. Sandra  and Dr Dixon.     Electronically Signed By-Alissa Aguirre On:3/20/2020 10:21 PM  This report was finalized on [id number] by  Alissa Aguirre, .  Signed:  03/20/20 2221 by Alissa Aguirre MD    Narrative:       CT ABDOMEN PELVIS W CONTRAST-     Date of Exam: 3/20/2020 8:35 PM     Indication: flank pain.  Left flank pain     Comparison: None available.     Technique: Contiguous axial CT images were obtained from the lung bases  to the superior iliac crests without contrast.  Following uneventful IV  administration of 100 Isovue-370 and oral contrast, contiguous axial  images obtained from lung bases to the pubic symphysis. Sagittal and  coronal reconstructions were performed.  Automated exposure control and  iterative reconstruction methods were used.     FINDINGS:  There is a small hiatal hernia. There is minor bandlike atelectasis in  the left lower lobe. No abnormality seen in the gallbladder. There is a  subcentimeter hypodense liver lesion consistent with a cyst. No  abnormality is seen in the adrenals, right kidney, spleen, pancreas, or  aorta.     There is absence of perfusion of most of the left kidney. There is a  parenchymal enhancement of part of the medial aspect of the left kidney.  The proximal part of the left renal artery is opacified;  about 1 cm  from its origin, the left renal artery appears roughly unopacified.  There is contrast in the left renal vein; the midportion of the left  renal vein is not opacified as well as the rest.     There is minor aortic atherosclerosis. The right renal artery is patent.  The superior mesenteric and celiac arteries appear patent.     There is a long linear defect in the left gonadal vein suggesting a  nonoccluding thrombus. The left gonadal vein appears to drain into the  portal vein.      Parts of the stomach are contracted. Small bowel appears within normal.  The appendix is not identified and has reportedly  been removed. Part of  the sigmoid is contracted. There is no free fluid or free air. No  ureteral stone. The bladder wall appears thickened, likely due to  incomplete distention.          Impression:          1. The appearance of the left kidney is consistent with almost complete  infarction. There is evidence for occlusion of the left renal artery  about 1 cm from its origin.  2. The appearance of the left renal vein is most likely due to decreased  flow from the arterial obstruction.  3. Suspect a nonoccluding thrombus in the left gonadal vein, which  empties into the portal vein..  4. Findings were discussed with Dr. Dixon by myself.     Electronically Signed By-Alissa Aguirre On:3/20/2020 9:31 PM  This report was finalized on [id number] by  Alissa Aguirre, .          Inpatient Meds:   Scheduled Meds:  sodium chloride 10 mL Intravenous Q12H   sodium chloride         Continuous Infusions:  heparin 18 Units/kg/hr Last Rate: 16 Units/kg/hr (03/21/20 0426)   lactated ringers 125 mL/hr Last Rate: 125 mL/hr (03/21/20 0425)      PRN Meds:.•  acetaminophen **OR** acetaminophen **OR** acetaminophen  •  Calcium Gluconate-NaCl **AND** calcium gluconate **AND** Calcium, Ionized  •  docusate sodium  •  heparin  •  heparin  •  influenza vaccine  •  ketamine (KETALAR) infusion **AND** Ketamine Vital Signs & Assessment  •  magnesium sulfate **OR** magnesium sulfate **OR** magnesium sulfate  •  melatonin  •  Morphine  •  nitroglycerin  •  ondansetron **OR** ondansetron  •  potassium & sodium phosphates **OR** potassium & sodium phosphates  •  potassium chloride  •  potassium chloride  •  [COMPLETED] Insert peripheral IV **AND** sodium chloride  •  sodium chloride      Assessment/Plan     Left renal infarct   not much to add from a urology standpoint.  Vascular and IR have been consulted.  Patient will definitely need to be on his blood thinners and the primary team will need to look for reasons for why he is so  hypercoagulable.    We will see him in the office next week    I discussed the patients findings and my recommendations with patient.    Noel Luna MD  03/21/20  07:29      Electronically signed by Noel Luna MD at 03/21/20 0750

## 2020-03-21 NOTE — CONSULTS
Patient Name: Damien Peña Account #: [id number]    MRN: [id number] Admission Date: 3/20/2020      Consulting Service: Vascular Surgery Date of Evaluation: March 21, 2020    Requesting Provider: Dr. 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 male is being seen for a consultation and evaluation/management of left renal artery occlusion with infarcted kidney.  Symptoms began yesterday morning with acute onset left mid back pain.  The patient reports he ate some waffles and became ill thereafter.  He was unable to get a ride to the hospital until late last night.  At that point time CT scan is indicated total occlusion of his left renal artery this non-ostial in nature and appears to have a meniscus sign.  I believe this to be an embolic source clot to his left kidney that is because the entire kidney to be injured.  It is likely the kidney will be lost.  He was already well past the window of intervention upon entering the hospital.  Concerns now are for the source of this likely embolic disease.  If cardiac source is completely ruled out then hypercoagulability will need to be assessed by hematology.  Unfortunately we really do not have much to offer as far as revascularization of the kidney but obviously concerns for paradoxical embolus have to be addressed as well and we will order venous duplex of the legs.    PAST MEDICAL HISTORY:   Past Medical History:   Diagnosis Date   • CAD (coronary artery disease)    • Tobacco abuse       PAST SURGICAL HISTORY:   Past Surgical History:   Procedure Laterality Date   • APPENDECTOMY     • COLONOSCOPY        FAMILY HISTORY:   Family History   Problem Relation Age of Onset   • Heart disease Mother    • Coronary artery disease Father       SOCIAL HISTORY:   Social History     Tobacco Use   • Smoking status: Current Every Day Smoker     Packs/day: 1.50     Years: 30.00     Pack years: 45.00  "    Types: Cigarettes   • Smokeless tobacco: Never Used   Substance Use Topics   • Alcohol use: Yes     Comment: beer occasionally   • Drug use: Never      MEDICATIONS:   No current facility-administered medications on file prior to encounter.      No current outpatient medications on file prior to encounter.             ALLERGIES: Patient has no known allergies.   COMPLETE REVIEW OF SYSTEMS:     ENT ROS: negative  Cardiovascular ROS: no chest pain or dyspnea on exertion  Respiratory ROS: no cough, shortness of breath, or wheezing  Gastrointestinal ROS: no abdominal pain, change in bowel habits, or black or bloody stools  Neurological ROS: no TIA or stroke symptoms, complains of intermittent headache  Genito-Urinary ROS: no dysuria, trouble voiding, or hematuria  Musculoskeletal ROS: negative  Dermatological ROS: negative  Psychological ROS: negative      PHYSICAL EXAM:   Patient Vitals for the past 24 hrs:   BP Temp Temp src Pulse Resp SpO2 Height Weight   03/21/20 1323 119/75 98.3 °F (36.8 °C) -- 52 16 96 % -- --   03/21/20 1055 123/73 -- -- -- -- -- 172.7 cm (68\") 56.7 kg (125 lb)   03/21/20 0014 123/73 98.1 °F (36.7 °C) Oral (!) 49 16 97 % 172.7 cm (68\") 56.7 kg (125 lb)   03/20/20 2331 118/72 -- -- 57 18 98 % -- --   03/20/20 2246 118/70 -- -- 56 18 97 % -- --   03/20/20 2146 129/77 -- -- 54 18 98 % -- --   03/20/20 2131 133/74 -- -- 59 -- 97 % -- --   03/20/20 2101 137/79 -- -- 64 18 97 % -- --   03/20/20 2016 136/81 -- -- 59 18 95 % -- --   03/20/20 1942 146/83 -- -- 58 18 99 % -- --   03/20/20 1845 -- -- -- -- -- -- 172.7 cm (68\") 56.7 kg (125 lb)   03/20/20 1840 138/77 97.6 °F (36.4 °C) -- 58 19 93 % -- --        General appearance: alert, well appearing, and in no distress.  Neurological exam reveals alert, oriented, normal speech, no focal findings or movement disorder noted.  ENT exam reveals - ENT exam normal, no neck nodes or sinus tenderness.  CVS exam: normal rate, regular rhythm, normal S1, S2, no " murmurs, rubs, clicks or gallops.  Chest: clear to auscultation, no wheezes, rales or rhonchi, symmetric air entry.  Abdominal exam: soft, nontender, nondistended, no masses or organomegaly.    Moderate left costovertebral angle tenderness noted  Examination of the feet reveals warm, good capillary refill.  No evidence of digital injury in either hands or feet.  No evidence of embolic stigmata or phenomenon  All pulses +2.      LABS:      Results Review:       I reviewed the patient's new clinical results.  Results from last 7 days   Lab Units 03/21/20  0257 03/20/20  1911   WBC 10*3/mm3 11.20* 9.40   HEMOGLOBIN g/dL 15.4 17.1   PLATELETS 10*3/mm3 167 189     Results from last 7 days   Lab Units 03/21/20 0257 03/20/20  1911   SODIUM mmol/L 140 140   POTASSIUM mmol/L 4.3 3.9   CHLORIDE mmol/L 103 102   CO2 mmol/L 26.0 24.0   BUN mg/dL 12 10   CREATININE mg/dL 1.81* 1.46*   GLUCOSE mg/dL 129* 118*   Estimated Creatinine Clearance: 35.7 mL/min (A) (by C-G formula based on SCr of 1.81 mg/dL (H)).  Results from last 7 days   Lab Units 03/21/20 0257 03/20/20  1911   CALCIUM mg/dL 9.3 10.2   ALBUMIN g/dL 4.00 4.30   MAGNESIUM mg/dL 2.0  --    PHOSPHORUS mg/dL 3.7  --      Results from last 7 days   Lab Units 03/21/20 0257 03/20/20  2145   PROTIME Seconds 11.4 11.2   INR  1.11* 1.08       The following radiologic or non-invasive studies have been reviewed by me: CT scan reviewed with images reviewed.    Active Hospital Problems    Diagnosis  POA   • **Kidney infarction (CMS/HCC) [N28.0]  Yes   • JOSE MANUEL (acute kidney injury) (CMS/HCC) [N17.9]  Yes   • CAD (coronary artery disease) [I25.10]  Yes   • Tobacco abuse [Z72.0]  Yes      Resolved Hospital Problems   No resolved problems to display.         ASSESSMENT/PLAN: 59 y.o. male with acute infarct to the left kidney.  I believe the majority of the kidney will be lost.  I do not believe this is a salvageable kidney but he is doing fine with his other kidneys on his week are  careful about exposure to further dye or NSAIDs.  I think the big concern here is what the source of this injury was.  It is very unclear to me but I would have to believe that cardioembolic source is the #1 rule out disease.  If this is negative to both transthoracic and possibly transesophageal echo then I would be inclined to pursue a full hypercoagulable work-up with hematology.  We will rule out paradoxical embolus with venous duplex of the legs.  He is currently tolerated this injury well and has relatively mild complaints of pain at this point time.  I recommend continuing the heparin drip until we have a consensus amongst the caregivers as to what blood thinners to use.  This will be highly dependent on what the source of the injury is.      I discussed the plan with the patient and he and his family who were present during our exam by telephone are agreeable to the plan of care at this point. Thank you for this consult.     Timmy Bourgeois MD   03/21/20

## 2020-03-21 NOTE — CONSULTS
Urology Consult Note    Patient:Damien Peña :1960  Room:[facility]  Admit Date3/  Age:59 y.o.     SEX:male     DOS:3/21/2020     MR:[id number]     Visit:[id number]       Attending: Florecita Fuentes MD  Referring Provider: Dr Fuentes  Reason for Consultation: Left renal infarct    Patient Care Team:  Provider, No Known as PCP - General    Chief complaint left flank pain    Subjective .     History of present illness: Patient is a 59-year-old white male with a history of coronary artery disease who is supposed to be on Plavix but has not taken any Plavix in at least the last 6 months.  He developed severe left flank pain radiating down to his groin area that started yesterday.  No associated fevers.  I reviewed his CT scan which revealed a left renal infarct.  Patient is supposed to have an appointment with  sometime next week.  He does not have a family doctor    Review of Systems  12 point review of systems were reviewed and are negative except for what is in HPI.    History  Past Medical History:   Diagnosis Date   • CAD (coronary artery disease)    • Tobacco abuse      Past Surgical History:   Procedure Laterality Date   • APPENDECTOMY     • COLONOSCOPY       Social History     Socioeconomic History   • Marital status: Unknown     Spouse name: Not on file   • Number of children: Not on file   • Years of education: Not on file   • Highest education level: Not on file   Tobacco Use   • Smoking status: Current Every Day Smoker     Packs/day: 1.50     Years: 30.00     Pack years: 45.00     Types: Cigarettes   • Smokeless tobacco: Never Used   Substance and Sexual Activity   • Alcohol use: Yes     Comment: beer occasionally   • Drug use: Never   • Sexual activity: Defer     Family History   Problem Relation Age of Onset   • Heart disease Mother    • Coronary artery disease Father      No Known Allergies  Prior to Admission medications    Not on File         Objective     tMax 24  hours:  Temp (24hrs), Av.9 °F (36.6 °C), Min:97.6 °F (36.4 °C), Max:98.1 °F (36.7 °C)    Vital Sign Ranges:  Temp:  [97.6 °F (36.4 °C)-98.1 °F (36.7 °C)] 98.1 °F (36.7 °C)  Heart Rate:  [49-64] 49  Resp:  [16-19] 16  BP: (118-146)/(70-83) 123/73  Intake and Output Last 3 Shifts:  I/O last 3 completed shifts:  In: 1000 [IV Piggyback:1000]  Out: -       Physical Exam:     General Appearance:    Alert, cooperative, in no acute distress   Head:    Normocephalic, without obvious abnormality, atraumatic   Eyes:            Lids and lashes normal, conjunctivae and sclerae normal, no   icterus, no pallor, corneas clear, PERRLA   Ears:    Ears appear intact with no abnormalities noted   Throat:   No oral lesions, no thrush, oral mucosa moist   Neck:   No adenopathy, supple, trachea midline, no thyromegaly, no   carotid bruit, no JVD   Back:     No kyphosis present, no scoliosis present, no skin lesions,      erythema or scars, no tenderness to percussion or                   palpation,   range of motion normal   Lungs:     Clear to auscultation,respirations regular, even and                  unlabored    Heart:    Regular rhythm and normal rate, normal S1 and S2, no            murmur, no gallop, no rub, no click   Chest Wall:    No abnormalities observed   Abdomen:     Normal bowel sounds, no masses, no organomegaly, soft        non-tender, non-distended, no guarding, no rebound                tenderness   Rectal:     Deferred   Extremities:   Moves all extremities well, no edema, no cyanosis, no             redness   Pulses:   Pulses palpable and equal bilaterally   Skin:   No bleeding, bruising or rash   Lymph nodes:   No palpable adenopathy   Neurologic:   Cranial nerves 2 - 12 grossly intact, sensation intact, DTR       present and equal bilaterally       Results Review:     Lab Results (last 24 hours)     Procedure Component Value Units Date/Time    CBC & Differential [[id number]] Collected:  20 0257     Specimen:  Blood Updated:  03/21/20 0452    Narrative:       The following orders were created for panel order CBC & Differential.  Procedure                               Abnormality         Status                     ---------                               -----------         ------                     CBC Auto Differential[[id number]]        Abnormal            Final result                 Please view results for these tests on the individual orders.    CBC Auto Differential [[id number]]  (Abnormal) Collected:  03/21/20 0257    Specimen:  Blood Updated:  03/21/20 0452     WBC 11.20 10*3/mm3      RBC 4.41 10*6/mm3      Hemoglobin 15.4 g/dL      Hematocrit 42.2 %      MCV 95.7 fL      MCH 34.9 pg      MCHC 36.4 g/dL      RDW 12.9 %      RDW-SD 42.9 fl      MPV 9.5 fL      Platelets 167 10*3/mm3      Neutrophil % 86.9 %      Lymphocyte % 10.8 %      Monocyte % 2.1 %      Eosinophil % 0.0 %      Basophil % 0.2 %      Neutrophils, Absolute 9.70 10*3/mm3      Lymphocytes, Absolute 1.20 10*3/mm3      Monocytes, Absolute 0.20 10*3/mm3      Eosinophils, Absolute 0.00 10*3/mm3      Basophils, Absolute 0.00 10*3/mm3      nRBC 0.1 /100 WBC     CK [[id number]]  (Normal) Collected:  03/21/20 0257    Specimen:  Blood Updated:  03/21/20 0418     Creatine Kinase 105 U/L     Comprehensive Metabolic Panel [[id number]]  (Abnormal) Collected:  03/21/20 0257    Specimen:  Blood Updated:  03/21/20 0418     Glucose 129 mg/dL      BUN 12 mg/dL      Creatinine 1.81 mg/dL      Sodium 140 mmol/L      Potassium 4.3 mmol/L      Chloride 103 mmol/L      CO2 26.0 mmol/L      Calcium 9.3 mg/dL      Total Protein 6.3 g/dL      Albumin 4.00 g/dL      ALT (SGPT) 25 U/L      AST (SGOT) 28 U/L      Alkaline Phosphatase 57 U/L      Total Bilirubin 1.0 mg/dL      eGFR Non African Amer 39 mL/min/1.73      Globulin 2.3 gm/dL      A/G Ratio 1.7 g/dL      BUN/Creatinine Ratio 6.6     Anion Gap 11.0 mmol/L     Narrative:       GFR Normal >60  Chronic Kidney  Disease <60  Kidney Failure <15      Troponin [[id number]]  (Normal) Collected:  03/21/20 0257    Specimen:  Blood Updated:  03/21/20 0418     Troponin T <0.010 ng/mL     Narrative:       Troponin T Reference Range:  <= 0.03 ng/mL-   Negative for AMI  >0.03 ng/mL-     Abnormal for myocardial necrosis.  Clinicians would have to utilize clinical acumen, EKG, Troponin and serial changes to determine if it is an Acute Myocardial Infarction or myocardial injury due to an underlying chronic condition.       Results may be falsely decreased if patient taking Biotin.      Phosphorus [[id number]]  (Normal) Collected:  03/21/20 0257    Specimen:  Blood Updated:  03/21/20 0418     Phosphorus 3.7 mg/dL     Magnesium [[id number]]  (Normal) Collected:  03/21/20 0257    Specimen:  Blood Updated:  03/21/20 0417     Magnesium 2.0 mg/dL     BNP [[id number]]  (Normal) Collected:  03/21/20 0257    Specimen:  Blood Updated:  03/21/20 0412     proBNP 763.1 pg/mL     Narrative:       Among patients with dyspnea, NT-proBNP is highly sensitive for the detection of acute congestive heart failure. In addition NT-proBNP of <300 pg/ml effectively rules out acute congestive heart failure with 99% negative predictive value.    Results may be falsely decreased if patient taking Biotin.      Protime-INR [[id number]]  (Abnormal) Collected:  03/21/20 0257    Specimen:  Blood Updated:  03/21/20 0400     Protime 11.4 Seconds      INR 1.11    aPTT [[id number]]  (Abnormal) Collected:  03/21/20 0257    Specimen:  Blood Updated:  03/21/20 0400     PTT 85.3 seconds      Comment: Result checked        Protime-INR [[id number]]  (Normal) Collected:  03/20/20 2145    Specimen:  Blood Updated:  03/20/20 2217     Protime 11.2 Seconds      INR 1.08    aPTT [[id number]]  (Abnormal) Collected:  03/20/20 2145    Specimen:  Blood Updated:  03/20/20 2217     PTT 21.1 seconds     Extra Tubes [[id number]] Collected:  03/20/20 1911    Specimen:  Blood, Venous Line Updated:   03/20/20 2015    Narrative:       The following orders were created for panel order Extra Tubes.  Procedure                               Abnormality         Status                     ---------                               -----------         ------                     Gold Top - SST[[id number]]                                   Final result                 Please view results for these tests on the individual orders.    [facility] - SST [[id number]] Collected:  03/20/20 1911    Specimen:  Blood Updated:  03/20/20 2015     Extra Tube Hold for add-ons.     Comment: Auto resulted.       CBC & Differential [[id number]] Collected:  03/20/20 1911    Specimen:  Blood Updated:  03/20/20 1952    Narrative:       The following orders were created for panel order CBC & Differential.  Procedure                               Abnormality         Status                     ---------                               -----------         ------                     CBC Auto Differential[[id number]]        Abnormal            Final result                 Please view results for these tests on the individual orders.    CBC Auto Differential [[id number]]  (Abnormal) Collected:  03/20/20 1911    Specimen:  Blood Updated:  03/20/20 1952     WBC 9.40 10*3/mm3      RBC 5.15 10*6/mm3      Hemoglobin 17.1 g/dL      Hematocrit 48.3 %      MCV 93.8 fL      MCH 33.3 pg      MCHC 35.5 g/dL      RDW 12.7 %      RDW-SD 42.0 fl      MPV 9.2 fL      Platelets 189 10*3/mm3      Neutrophil % 80.2 %      Lymphocyte % 15.7 %      Monocyte % 3.2 %      Eosinophil % 0.3 %      Basophil % 0.6 %      Neutrophils, Absolute 7.60 10*3/mm3      Lymphocytes, Absolute 1.50 10*3/mm3      Monocytes, Absolute 0.30 10*3/mm3      Eosinophils, Absolute 0.00 10*3/mm3      Basophils, Absolute 0.10 10*3/mm3      nRBC 0.9 /100 WBC     Scan Slide [[id number]]  (Normal) Collected:  03/20/20 1911    Specimen:  Blood Updated:  03/20/20 1952     RBC Morphology Normal     WBC Morphology  Normal     Platelet Morphology Normal    Narrative:       Slide Reviewed    Comprehensive Metabolic Panel [[id number]]  (Abnormal) Collected:  03/20/20 1911    Specimen:  Blood Updated:  03/20/20 1947     Glucose 118 mg/dL      BUN 10 mg/dL      Creatinine 1.46 mg/dL      Sodium 140 mmol/L      Potassium 3.9 mmol/L      Chloride 102 mmol/L      CO2 24.0 mmol/L      Calcium 10.2 mg/dL      Total Protein 7.0 g/dL      Albumin 4.30 g/dL      ALT (SGPT) 22 U/L      AST (SGOT) 20 U/L      Alkaline Phosphatase 69 U/L      Total Bilirubin 0.8 mg/dL      eGFR Non African Amer 50 mL/min/1.73      Globulin 2.7 gm/dL      A/G Ratio 1.6 g/dL      BUN/Creatinine Ratio 6.8     Anion Gap 14.0 mmol/L     Narrative:       GFR Normal >60  Chronic Kidney Disease <60  Kidney Failure <15      Lipase [[id number]]  (Normal) Collected:  03/20/20 1911    Specimen:  Blood Updated:  03/20/20 1947     Lipase 17 U/L     Troponin [[id number]]  (Normal) Collected:  03/20/20 1911    Specimen:  Blood Updated:  03/20/20 1947     Troponin T <0.010 ng/mL     Narrative:       Troponin T Reference Range:  <= 0.03 ng/mL-   Negative for AMI  >0.03 ng/mL-     Abnormal for myocardial necrosis.  Clinicians would have to utilize clinical acumen, EKG, Troponin and serial changes to determine if it is an Acute Myocardial Infarction or myocardial injury due to an underlying chronic condition.       Results may be falsely decreased if patient taking Biotin.      Urinalysis With Culture If Indicated - Urine, Clean Catch [[id number]]  (Abnormal) Collected:  03/20/20 1915    Specimen:  Urine, Clean Catch Updated:  03/20/20 1926     Color, UA Dark Yellow     Appearance, UA Clear     pH, UA 6.0     Specific Gravity, UA 1.021     Glucose, UA Negative     Ketones, UA Trace     Bilirubin, UA Negative     Blood, UA Negative     Protein, UA 30 mg/dL (1+)     Leuk Esterase, UA Negative     Nitrite, UA Negative     Urobilinogen, UA 0.2 E.U./dL    Urinalysis, Microscopic Only -  Urine, Clean Catch [[id number]]  (Abnormal) Collected:  03/20/20 1915    Specimen:  Urine, Clean Catch Updated:  03/20/20 1926     RBC, UA 0-2 /HPF      WBC, UA 3-5 /HPF      Bacteria, UA None Seen /HPF      Squamous Epithelial Cells, UA None Seen /HPF      Hyaline Casts, UA 3-6 /LPF      Methodology Automated Microscopy         No results found for: URINECX     Imaging Results (Last 7 Days)     Procedure Component Value Units Date/Time    CT Abdomen Pelvis With Contrast [[id number]] Collected:  03/20/20 2119     Updated:  03/20/20 2225    Addenda:        This is an addendum to the CT abdomen pelvis from 03/20/2020. There are  at least 2 areas in the apical portion of the lumen of the left  ventricle suggesting possible thrombi. This was discussed with Dr. Sandra  and Dr Dixon.     Electronically Signed By-Alissa Aguirre On:3/20/2020 10:21 PM  This report was finalized on [id number] by  Alissa Aguirre, .  Signed:  03/20/20 2221 by Alissa Aguirre MD    Narrative:       CT ABDOMEN PELVIS W CONTRAST-     Date of Exam: 3/20/2020 8:35 PM     Indication: flank pain.  Left flank pain     Comparison: None available.     Technique: Contiguous axial CT images were obtained from the lung bases  to the superior iliac crests without contrast.  Following uneventful IV  administration of 100 Isovue-370 and oral contrast, contiguous axial  images obtained from lung bases to the pubic symphysis. Sagittal and  coronal reconstructions were performed.  Automated exposure control and  iterative reconstruction methods were used.     FINDINGS:  There is a small hiatal hernia. There is minor bandlike atelectasis in  the left lower lobe. No abnormality seen in the gallbladder. There is a  subcentimeter hypodense liver lesion consistent with a cyst. No  abnormality is seen in the adrenals, right kidney, spleen, pancreas, or  aorta.     There is absence of perfusion of most of the left kidney. There is a  parenchymal enhancement of part of  the medial aspect of the left kidney.  The proximal part of the left renal artery is opacified;  about 1 cm  from its origin, the left renal artery appears roughly unopacified.  There is contrast in the left renal vein; the midportion of the left  renal vein is not opacified as well as the rest.     There is minor aortic atherosclerosis. The right renal artery is patent.  The superior mesenteric and celiac arteries appear patent.     There is a long linear defect in the left gonadal vein suggesting a  nonoccluding thrombus. The left gonadal vein appears to drain into the  portal vein.      Parts of the stomach are contracted. Small bowel appears within normal.  The appendix is not identified and has reportedly been removed. Part of  the sigmoid is contracted. There is no free fluid or free air. No  ureteral stone. The bladder wall appears thickened, likely due to  incomplete distention.          Impression:          1. The appearance of the left kidney is consistent with almost complete  infarction. There is evidence for occlusion of the left renal artery  about 1 cm from its origin.  2. The appearance of the left renal vein is most likely due to decreased  flow from the arterial obstruction.  3. Suspect a nonoccluding thrombus in the left gonadal vein, which  empties into the portal vein..  4. Findings were discussed with Dr. Dixon by myself.     Electronically Signed By-Alissa Aguirre On:3/20/2020 9:31 PM  This report was finalized on [id number] by  Alissa Aguirre, .          Inpatient Meds:   Scheduled Meds:  sodium chloride 10 mL Intravenous Q12H   sodium chloride         Continuous Infusions:  heparin 18 Units/kg/hr Last Rate: 16 Units/kg/hr (03/21/20 0426)   lactated ringers 125 mL/hr Last Rate: 125 mL/hr (03/21/20 0425)      PRN Meds:.•  acetaminophen **OR** acetaminophen **OR** acetaminophen  •  Calcium Gluconate-NaCl **AND** calcium gluconate **AND** Calcium, Ionized  •  docusate sodium  •  heparin  •   heparin  •  influenza vaccine  •  ketamine (KETALAR) infusion **AND** Ketamine Vital Signs & Assessment  •  magnesium sulfate **OR** magnesium sulfate **OR** magnesium sulfate  •  melatonin  •  Morphine  •  nitroglycerin  •  ondansetron **OR** ondansetron  •  potassium & sodium phosphates **OR** potassium & sodium phosphates  •  potassium chloride  •  potassium chloride  •  [COMPLETED] Insert peripheral IV **AND** sodium chloride  •  sodium chloride      Assessment/Plan     Left renal infarct   not much to add from a urology standpoint.  Vascular and IR have been consulted.  Patient will definitely need to be on his blood thinners and the primary team will need to look for reasons for why he is so hypercoagulable.    We will see him in the office next week    I discussed the patients findings and my recommendations with patient.    Noel Luna MD  03/21/20  07:29

## 2020-03-21 NOTE — PLAN OF CARE
Pt c/o abdominal/back pain. Pt has been restless and asking for something to help him sleep. One time dose of ativan given. Pt now resting comfortably. Urology and vascular surgery consulted. Will continue to monitor...

## 2020-03-21 NOTE — PROGRESS NOTES
[facility] Medicine Services Daily Progress Note      Hospitalist Team  LOS 1 days      Patient Care Team:  Provider, No Known as PCP - General    Patient Location: [facility]/1      Subjective   Subjective     Chief Complaint / Subjective  Chief Complaint   Patient presents with   • Flank Pain       Present on Admission:  • Kidney infarction (CMS/HCC)  • CAD (coronary artery disease)  • Tobacco abuse  • JOSE MANUEL (acute kidney injury) (CMS/HCC)    Patient stating pain has improved     Brief Synopsis of Hospital Course/HPI  Mr. Peña is a 59 y.o. male with past medical history of CAD status post stenting and tobacco abuse who presents to [facility] complaining of left-sided flank and back pain.  Patient states that approximately 1300 hrs. on 3/20/2020 following p.o. intake he began experiencing an aching pain in his left flank radiating to his left back rated at 9/10 made worse with movement.  In addition to the constant aching pain patient does report intermittent sharp pains.  Some associated nausea and vomiting and a burning sensation in his chest are reported however patient denies any shortness of air, cough, fever, changes in bowel or bladder habits or recent sick contacts.     In the ED patient found to have labs significant for troponin less than 0.010, creatinine: 1.46, remainder of CMP and CBC generally unremarkable.  UA shows trace ketones with 1+ protein, 0-2 RBCs and 3-5 WBCs.  CT of the abdomen shows the appearance of the left kidney consistent with complete infarction and evidence of occlusion of the left renal artery.  Decreased flow in the left renal vein as well as a nonoccluding thrombus in the left gonadal vein is also reported.  EKG shows sinus bradycardia at 84 without obvious ST changes or ectopy.    Review of Systems   Constitution: Negative for chills, decreased appetite, diaphoresis, fever, malaise/fatigue, night sweats, weight gain and weight loss.  "  Cardiovascular: Negative for chest pain, claudication, cyanosis, dyspnea on exertion, irregular heartbeat, leg swelling, near-syncope, orthopnea, palpitations, paroxysmal nocturnal dyspnea and syncope.   Respiratory: Negative for cough, hemoptysis, shortness of breath, sleep disturbances due to breathing, snoring, sputum production and wheezing.        Objective   Objective      Vital Signs  Temp:  [97.6 °F (36.4 °C)-98.3 °F (36.8 °C)] 98.3 °F (36.8 °C)  Heart Rate:  [49-64] 52  Resp:  [16-19] 16  BP: (118-146)/(70-83) 119/75  Oxygen Therapy  SpO2: 96 %  Device (Oxygen Therapy): room air  Flowsheet Rows      First Filed Value   Admission Height  172.7 cm (68\") Documented at 03/20/2020 1845   Admission Weight  56.7 kg (125 lb) Documented at 03/20/2020 1845        Intake & Output (last 3 days)       03/18 0701 - 03/19 0700 03/19 0701 - 03/20 0700 03/20 0701 - 03/21 0700 03/21 0701 - 03/22 0700    P.O.    600    IV Piggyback   1000     Total Intake(mL/kg)   1000 (17.6) 600 (10.6)    Urine (mL/kg/hr)    175 (0.3)    Total Output    175    Net   +1000 +425                Lines, Drains & Airways    Active LDAs     Name:   Placement date:   Placement time:   Site:   Days:    Peripheral IV 03/20/20 1857 Left Arm   03/20/20 1857    Arm   less than 1    Peripheral IV 03/20/20 2218 Left Antecubital   03/20/20    2218    Antecubital   less than 1                  Physical Exam:  Constitutional: He is oriented to person, place, and time. He appears well-developed and well-nourished. No distress.   HENT:   Head: Normocephalic and atraumatic.   Right Ear: External ear normal.   Left Ear: External ear normal.   Nose: Nose normal.   Eyes: Conjunctivae and EOM are normal.   Neck: Normal range of motion  Cardiovascular: Normal rate, regular rhythm, normal heart sounds and intact distal pulses.   Pulmonary/Chest: Effort normal and breath sounds normal.   Abdominal: Soft. Bowel sounds are normal. There is no tenderness. "   Musculoskeletal: Normal range of motion.   Neurological: He is alert and oriented to person, place, and time.   Skin: Skin is warm and dry.   Psychiatric: He has a normal mood and affect. His behavior is normal. Judgment and thought content normal.   Vitals reviewed.    Procedures:              Results Review:     I reviewed the patient's new clinical results.      Lab Results (last 24 hours)     Procedure Component Value Units Date/Time    aPTT [[id number]]  (Abnormal) Collected:  03/21/20 1545    Specimen:  Blood Updated:  03/21/20 1616     PTT 55.6 seconds     Troponin [[id number]]  (Normal) Collected:  03/21/20 1008    Specimen:  Blood Updated:  03/21/20 1101     Troponin T <0.010 ng/mL     Narrative:       Troponin T Reference Range:  <= 0.03 ng/mL-   Negative for AMI  >0.03 ng/mL-     Abnormal for myocardial necrosis.  Clinicians would have to utilize clinical acumen, EKG, Troponin and serial changes to determine if it is an Acute Myocardial Infarction or myocardial injury due to an underlying chronic condition.       Results may be falsely decreased if patient taking Biotin.      aPTT [[id number]]  (Normal) Collected:  03/21/20 1008    Specimen:  Blood Updated:  03/21/20 1051     PTT 65.2 seconds     CBC & Differential [[id number]] Collected:  03/21/20 0257    Specimen:  Blood Updated:  03/21/20 0452    Narrative:       The following orders were created for panel order CBC & Differential.  Procedure                               Abnormality         Status                     ---------                               -----------         ------                     CBC Auto Differential[[id number]]        Abnormal            Final result                 Please view results for these tests on the individual orders.    CBC Auto Differential [[id number]]  (Abnormal) Collected:  03/21/20 0257    Specimen:  Blood Updated:  03/21/20 0452     WBC 11.20 10*3/mm3      RBC 4.41 10*6/mm3      Hemoglobin 15.4 g/dL      Hematocrit  42.2 %      MCV 95.7 fL      MCH 34.9 pg      MCHC 36.4 g/dL      RDW 12.9 %      RDW-SD 42.9 fl      MPV 9.5 fL      Platelets 167 10*3/mm3      Neutrophil % 86.9 %      Lymphocyte % 10.8 %      Monocyte % 2.1 %      Eosinophil % 0.0 %      Basophil % 0.2 %      Neutrophils, Absolute 9.70 10*3/mm3      Lymphocytes, Absolute 1.20 10*3/mm3      Monocytes, Absolute 0.20 10*3/mm3      Eosinophils, Absolute 0.00 10*3/mm3      Basophils, Absolute 0.00 10*3/mm3      nRBC 0.1 /100 WBC     CK [[id number]]  (Normal) Collected:  03/21/20 0257    Specimen:  Blood Updated:  03/21/20 0418     Creatine Kinase 105 U/L     Comprehensive Metabolic Panel [[id number]]  (Abnormal) Collected:  03/21/20 0257    Specimen:  Blood Updated:  03/21/20 0418     Glucose 129 mg/dL      BUN 12 mg/dL      Creatinine 1.81 mg/dL      Sodium 140 mmol/L      Potassium 4.3 mmol/L      Chloride 103 mmol/L      CO2 26.0 mmol/L      Calcium 9.3 mg/dL      Total Protein 6.3 g/dL      Albumin 4.00 g/dL      ALT (SGPT) 25 U/L      AST (SGOT) 28 U/L      Alkaline Phosphatase 57 U/L      Total Bilirubin 1.0 mg/dL      eGFR Non African Amer 39 mL/min/1.73      Globulin 2.3 gm/dL      A/G Ratio 1.7 g/dL      BUN/Creatinine Ratio 6.6     Anion Gap 11.0 mmol/L     Narrative:       GFR Normal >60  Chronic Kidney Disease <60  Kidney Failure <15      Troponin [[id number]]  (Normal) Collected:  03/21/20 0257    Specimen:  Blood Updated:  03/21/20 0418     Troponin T <0.010 ng/mL     Narrative:       Troponin T Reference Range:  <= 0.03 ng/mL-   Negative for AMI  >0.03 ng/mL-     Abnormal for myocardial necrosis.  Clinicians would have to utilize clinical acumen, EKG, Troponin and serial changes to determine if it is an Acute Myocardial Infarction or myocardial injury due to an underlying chronic condition.       Results may be falsely decreased if patient taking Biotin.      Phosphorus [[id number]]  (Normal) Collected:  03/21/20 0257    Specimen:  Blood Updated:  03/21/20  0418     Phosphorus 3.7 mg/dL     Magnesium [[id number]]  (Normal) Collected:  03/21/20 0257    Specimen:  Blood Updated:  03/21/20 0417     Magnesium 2.0 mg/dL     BNP [[id number]]  (Normal) Collected:  03/21/20 0257    Specimen:  Blood Updated:  03/21/20 0412     proBNP 763.1 pg/mL     Narrative:       Among patients with dyspnea, NT-proBNP is highly sensitive for the detection of acute congestive heart failure. In addition NT-proBNP of <300 pg/ml effectively rules out acute congestive heart failure with 99% negative predictive value.    Results may be falsely decreased if patient taking Biotin.      Protime-INR [[id number]]  (Abnormal) Collected:  03/21/20 0257    Specimen:  Blood Updated:  03/21/20 0400     Protime 11.4 Seconds      INR 1.11    aPTT [[id number]]  (Abnormal) Collected:  03/21/20 0257    Specimen:  Blood Updated:  03/21/20 0400     PTT 85.3 seconds      Comment: Result checked        Protime-INR [[id number]]  (Normal) Collected:  03/20/20 2145    Specimen:  Blood Updated:  03/20/20 2217     Protime 11.2 Seconds      INR 1.08    aPTT [[id number]]  (Abnormal) Collected:  03/20/20 2145    Specimen:  Blood Updated:  03/20/20 2217     PTT 21.1 seconds     Extra Tubes [[id number]] Collected:  03/20/20 1911    Specimen:  Blood, Venous Line Updated:  03/20/20 2015    Narrative:       The following orders were created for panel order Extra Tubes.  Procedure                               Abnormality         Status                     ---------                               -----------         ------                     Gold Top - SST[[id number]]                                   Final result                 Please view results for these tests on the individual orders.    Gold Top - SST [[id number]] Collected:  03/20/20 1911    Specimen:  Blood Updated:  03/20/20 2015     Extra Tube Hold for add-ons.     Comment: Auto resulted.       CBC & Differential [[id number]] Collected:  03/20/20 1911    Specimen:  Blood  Updated:  03/20/20 1952    Narrative:       The following orders were created for panel order CBC & Differential.  Procedure                               Abnormality         Status                     ---------                               -----------         ------                     CBC Auto Differential[[id number]]        Abnormal            Final result                 Please view results for these tests on the individual orders.    CBC Auto Differential [[id number]]  (Abnormal) Collected:  03/20/20 1911    Specimen:  Blood Updated:  03/20/20 1952     WBC 9.40 10*3/mm3      RBC 5.15 10*6/mm3      Hemoglobin 17.1 g/dL      Hematocrit 48.3 %      MCV 93.8 fL      MCH 33.3 pg      MCHC 35.5 g/dL      RDW 12.7 %      RDW-SD 42.0 fl      MPV 9.2 fL      Platelets 189 10*3/mm3      Neutrophil % 80.2 %      Lymphocyte % 15.7 %      Monocyte % 3.2 %      Eosinophil % 0.3 %      Basophil % 0.6 %      Neutrophils, Absolute 7.60 10*3/mm3      Lymphocytes, Absolute 1.50 10*3/mm3      Monocytes, Absolute 0.30 10*3/mm3      Eosinophils, Absolute 0.00 10*3/mm3      Basophils, Absolute 0.10 10*3/mm3      nRBC 0.9 /100 WBC     Scan Slide [[id number]]  (Normal) Collected:  03/20/20 1911    Specimen:  Blood Updated:  03/20/20 1952     RBC Morphology Normal     WBC Morphology Normal     Platelet Morphology Normal    Narrative:       Slide Reviewed    Comprehensive Metabolic Panel [[id number]]  (Abnormal) Collected:  03/20/20 1911    Specimen:  Blood Updated:  03/20/20 1947     Glucose 118 mg/dL      BUN 10 mg/dL      Creatinine 1.46 mg/dL      Sodium 140 mmol/L      Potassium 3.9 mmol/L      Chloride 102 mmol/L      CO2 24.0 mmol/L      Calcium 10.2 mg/dL      Total Protein 7.0 g/dL      Albumin 4.30 g/dL      ALT (SGPT) 22 U/L      AST (SGOT) 20 U/L      Alkaline Phosphatase 69 U/L      Total Bilirubin 0.8 mg/dL      eGFR Non African Amer 50 mL/min/1.73      Globulin 2.7 gm/dL      A/G Ratio 1.6 g/dL      BUN/Creatinine Ratio 6.8      Anion Gap 14.0 mmol/L     Narrative:       GFR Normal >60  Chronic Kidney Disease <60  Kidney Failure <15      Lipase [[id number]]  (Normal) Collected:  03/20/20 1911    Specimen:  Blood Updated:  03/20/20 1947     Lipase 17 U/L     Troponin [[id number]]  (Normal) Collected:  03/20/20 1911    Specimen:  Blood Updated:  03/20/20 1947     Troponin T <0.010 ng/mL     Narrative:       Troponin T Reference Range:  <= 0.03 ng/mL-   Negative for AMI  >0.03 ng/mL-     Abnormal for myocardial necrosis.  Clinicians would have to utilize clinical acumen, EKG, Troponin and serial changes to determine if it is an Acute Myocardial Infarction or myocardial injury due to an underlying chronic condition.       Results may be falsely decreased if patient taking Biotin.      Urinalysis With Culture If Indicated - Urine, Clean Catch [[id number]]  (Abnormal) Collected:  03/20/20 1915    Specimen:  Urine, Clean Catch Updated:  03/20/20 1926     Color, UA Dark Yellow     Appearance, UA Clear     pH, UA 6.0     Specific Gravity, UA 1.021     Glucose, UA Negative     Ketones, UA Trace     Bilirubin, UA Negative     Blood, UA Negative     Protein, UA 30 mg/dL (1+)     Leuk Esterase, UA Negative     Nitrite, UA Negative     Urobilinogen, UA 0.2 E.U./dL    Urinalysis, Microscopic Only - Urine, Clean Catch [[id number]]  (Abnormal) Collected:  03/20/20 1915    Specimen:  Urine, Clean Catch Updated:  03/20/20 1926     RBC, UA 0-2 /HPF      WBC, UA 3-5 /HPF      Bacteria, UA None Seen /HPF      Squamous Epithelial Cells, UA None Seen /HPF      Hyaline Casts, UA 3-6 /LPF      Methodology Automated Microscopy        No results found for: HGBA1C  Results from last 7 days   Lab Units 03/21/20  0257 03/20/20  2145   INR  1.11* 1.08               Microbiology Results (last 10 days)     ** No results found for the last 240 hours. **          ECG/EMG Results (most recent)     Procedure Component Value Units Date/Time    ECG 12 Lead [[id number]] Collected:   03/20/20 1928     Updated:  03/20/20 1930    Narrative:       HEART RATE= 54  bpm  RR Interval= 1120  ms  IN Interval= 138  ms  P Horizontal Axis= -26  deg  P Front Axis= 57  deg  QRSD Interval= 99  ms  QT Interval= 450  ms  QRS Axis= -73  deg  T Wave Axis= 88  deg  - ABNORMAL ECG -  Pacemaker spikes or artifacts  Sinus bradycardia  Probable left atrial enlargement  Left anterior fascicular block  Anterolateral infarct, age indeterminate  Abnormal T, consider ischemia, lateral leads  Electronically Signed By:   Date and Time of Study: 2020-03-20 19:28:53    Adult Transthoracic Echo Complete W/ Cont if Necessary Per Protocol [[id number]] Collected:  03/21/20 1058     Updated:  03/21/20 1138     BSA 1.7 m^2      RVIDd 1.9 cm      IVSd 0.91 cm      LVIDd 3.9 cm      LVIDs 2.7 cm      LVPWd 1.0 cm      IVS/LVPW 0.89     FS 31.8 %      EDV(Teich) 67.3 ml      ESV(Teich) 26.6 ml      EF(Teich) 60.4 %      EDV(cubed) 60.9 ml      ESV(cubed) 19.3 ml      EF(cubed) 68.3 %      LV mass(C)d 118.1 grams      LV mass(C)dI 70.6 grams/m^2      SV(Teich) 40.7 ml      SI(Teich) 24.3 ml/m^2      SV(cubed) 41.6 ml      SI(cubed) 24.8 ml/m^2      Ao root diam 2.8 cm      Ao root area 6.2 cm^2      ACS 2.1 cm      LVOT diam 1.9 cm      LVOT area 2.8 cm^2      EDV(MOD-sp4) 85.5 ml      ESV(MOD-sp4) 36.1 ml      EF(MOD-sp4) 57.8 %      SV(MOD-sp4) 49.4 ml      SI(MOD-sp4) 29.5 ml/m^2      Ao root area (BSA corrected) 1.7     LV Retana Vol (BSA corrected) 51.1 ml/m^2      LV Sys Vol (BSA corrected) 21.6 ml/m^2      MV V2 max 94.1 cm/sec      MV max PG 3.5 mmHg      MV V2 mean 35.5 cm/sec      MV mean PG 0.79 mmHg      MV V2 VTI 31.3 cm      MVA(VTI) 1.8 cm^2      Ao pk kathleen 21.7 cm/sec      Ao max PG 0.19 mmHg      Ao max PG (full) -2.4 mmHg      KARINA(V,A) 10.1 cm^2      KARINA(V,D) 10.1 cm^2      LV V1 max PG 2.6 mmHg      LV V1 mean PG 1.4 mmHg      LV V1 max 79.9 cm/sec      LV V1 mean 54.4 cm/sec      LV V1 VTI 20.9 cm      SV(LVOT) 57.7 ml       SI(LVOT) 34.5 ml/m^2      PA V2 max 90.0 cm/sec      PA max PG 3.2 mmHg      PI end-d kathleen 100.6 cm/sec      PI max kathleen 147.9 cm/sec      PI max PG 8.7 mmHg      TR max kathleen 182.1 cm/sec      RVSP(TR) 16.3 mmHg      ASD mean kathleen 57.7 cm/sec      RAP systole 3.0 mmHg       CV ECHO CHRISTEL - BZI_BMI 19.0 kilograms/m^2       CV ECHO CHRISTEL - BSA(HAYCOCK) 1.6 m^2       CV ECHO CHRISTEL - BZI_METRIC_WEIGHT 56.7 kg       CV ECHO CHRISTEL - BZI_METRIC_HEIGHT 172.7 cm      EF(MOD-bp) 58.0 %      LA dimension(2D) 3.3 cm                     Ct Abdomen Pelvis With Contrast    Addendum Date: 3/20/2020    This is an addendum to the CT abdomen pelvis from 03/20/2020. There are at least 2 areas in the apical portion of the lumen of the left ventricle suggesting possible thrombi. This was discussed with Dr. Sandra and Dr Dixon.  Electronically Signed ByDex Aguirre On:3/20/2020 10:21 PM This report was finalized on [id number] by  Alissa Aguirre, .    Result Date: 3/20/2020   1. The appearance of the left kidney is consistent with almost complete infarction. There is evidence for occlusion of the left renal artery about 1 cm from its origin. 2. The appearance of the left renal vein is most likely due to decreased flow from the arterial obstruction. 3. Suspect a nonoccluding thrombus in the left gonadal vein, which empties into the portal vein.. 4. Findings were discussed with Dr. Dixon by myself.  Electronically Signed ByDex Aguirre On:3/20/2020 9:31 PM This report was finalized on [id number] by  Alissa Aguirre .      Xrays, labs reviewed personally by physician.    Medication Review:   I have reviewed the patient's current medication list      Scheduled Meds    sodium chloride 10 mL Intravenous Q12H       Meds Infusions    heparin 18 Units/kg/hr Last Rate: 16 Units/kg/hr (03/21/20 0426)   lactated ringers 125 mL/hr Last Rate: 125 mL/hr (03/21/20 0425)       Meds PRN  •  acetaminophen **OR** acetaminophen **OR**  acetaminophen  •  Calcium Gluconate-NaCl **AND** calcium gluconate **AND** Calcium, Ionized  •  docusate sodium  •  heparin  •  heparin  •  influenza vaccine  •  ketamine (KETALAR) infusion **AND** Ketamine Vital Signs & Assessment  •  magnesium sulfate **OR** magnesium sulfate **OR** magnesium sulfate  •  melatonin  •  Morphine  •  nitroglycerin  •  ondansetron **OR** ondansetron  •  potassium & sodium phosphates **OR** potassium & sodium phosphates  •  potassium chloride  •  potassium chloride  •  [COMPLETED] Insert peripheral IV **AND** sodium chloride  •  sodium chloride      Assessment/Plan   Assessment/Plan     Active Hospital Problems    Diagnosis  POA   • **Kidney infarction (CMS/HCC) [N28.0]  Yes   • JOSE MANUEL (acute kidney injury) (CMS/HCC) [N17.9]  Yes   • CAD (coronary artery disease) [I25.10]  Yes   • Tobacco abuse [Z72.0]  Yes      Resolved Hospital Problems   No resolved problems to display.     1.  Kidney infarction due to renal artery occlusion- likely secondary to cardio embolic source, especially in setting of patient with previous history of reported PCI status post stent and nonadherence with antiplatelet therapy on outpatient basis. CT of the abdomen shows the appearance of the left kidney consistent with complete infarction and evidence of occlusion of the left renal artery, patient with likely non-functioning kidney at this time.     - Cardiology consult, Dr. Campos, appreciate recommendations in regards to anticoagulation and cardioembolic work-up.  - TTE ordered, pending results  - May need to involve hematology for hypercoagulable work-up, however will hold off at this time as cardiac sources need to be ruled out first and are most likely  -Vascular surgery, urology and interventional radiology consulted in ED, appreciate recommendations   -Heparin started in ED, continue  -JOSE MANUEL noted addressed as below  -Monitor I's and O's     2.  JOSE MANUEL  -Creatinine: 1.46, BUN: 10, EGFR: 50  -Likely secondary to  "prerenal cause and IV dye noted above  -Monitor BMP  -Avoid nephrotoxic medication and further IV dye unless urgently needed  -Will consider nephrology consult if JOSE MANUEL worsens     3.  CAD  -Patient has a history of CAD status post stenting states he is supposed to \"take some medicine\" but that he is noncompliant.  Patient does report some radiation of his flank and abdominal pain into his chest  -Troponin less than 0.010, trend  -EKG shows sinus bradycardia without obvious ST changes or ectopy  -Patient currently on heparin as above, continue  -Continue cardiac monitoring     4.  Tobacco abuse  -Patient states he is currently 1-1/2 pack a day smoker  -Discussed cessation especially in light of patient's comorbid conditions  -Nicotine patch ordered        VTE Prophylaxis -patient currently on heparin    Code Status -   Code Status and Medical Interventions:   Ordered at: 03/20/20 2223     Level Of Support Discussed With:    Patient     Code Status:    CPR     Medical Interventions (Level of Support Prior to Arrest):    Full       Discharge Planning  -Pending cardiology recommendations     Destination      Coordination has not been started for this encounter.      Durable Medical Equipment      Coordination has not been started for this encounter.      Dialysis/Infusion      Coordination has not been started for this encounter.      Home Medical Care      Coordination has not been started for this encounter.      Therapy      Coordination has not been started for this encounter.      Community Resources      Coordination has not been started for this encounter.            Electronically signed by Florecita Fuentes MD, 03/21/20, 17:05.  [facility]      "

## 2020-03-21 NOTE — ED PROVIDER NOTES
Subjective   History of Present Illness  Context: 59-year-old male presents with left flank pain nausea vomiting.  He states that started this morning after eating waffles.  He reports this to be around 1:00pm.  He states the pain hit him suddenly.  He has had pain ever since then.  He has had no urinary difficulty.  He has never had pain like this before.  He has had associated nausea vomiting.  Location: Left flank  Quality: Pain  Duration: Onset around 1:00  Timing: Constant  Severity: Severe although has eased a little bit   Modifying factors:  Associated signs and symptoms: Nausea vomiting    Review of Systems   Constitutional: Negative for fever and unexpected weight change.   HENT: Negative for congestion and sore throat.    Eyes: Negative for pain.   Respiratory: Negative for cough and shortness of breath.    Cardiovascular: Positive for chest pain. Negative for leg swelling.        Reports he had some mild chest pain after arrival to the emergency department   Gastrointestinal: Positive for nausea and vomiting. Negative for abdominal pain and diarrhea.   Genitourinary: Negative for dysuria and urgency.   Musculoskeletal: Positive for back pain.   Skin: Negative for rash.   Neurological: Negative for dizziness, weakness and headaches.   Psychiatric/Behavioral: Negative for confusion.       No past medical history on file.  Coronary disease stents x2 hyperlipidemia  No Known Allergies    No past surgical history on file.    No family history on file.  Social history does smoke  Social History     Socioeconomic History   • Marital status: Unknown     Spouse name: Not on file   • Number of children: Not on file   • Years of education: Not on file   • Highest education level: Not on file     Reports taking no medications      Objective   Physical Exam  59-year-old male awake alert.  Generally well-developed well-nourished.  Pupils equal round react light.  Oropharynx clear neck supple chest clear equal breath  sounds cardiovascular regular rate and rhythm abdomen is soft positive bowel sounds some mild left side abdominal tenderness no rebound or guarding.  He complains of pain palpation of left flank.  Extremities are neurovascular grossly intact without tenderness edema.  Skin without rash noted.  Procedures           ED Course  ED Course as of Mar 20 2145   Fri Mar 20, 2020   1932 Patient presents to the ED with a complaint of left flank pain since this morning.  He denies any urinary symptoms.  He describes this is a sharp pain.  It is waxing and waning.  Patient also had an episode of vomiting while here in the [facility].  Also while here he complained of chest pain, an EKG was performed.  Patient does have a history of an MI.  Patient will be transferred to the main emergency department for cardiac monitoring and further evaluation.    Ordered CBC, CMP, lipase and urinalysis.  Added on troponin due to onset of chest pain.  Will order CT scan abdomen pelvis.    [LB]      ED Course User Index  [LB] Hodan Herbert, APRN      Results for orders placed or performed during the hospital encounter of 03/20/20   Comprehensive Metabolic Panel   Result Value Ref Range    Glucose 118 (H) 65 - 99 mg/dL    BUN 10 6 - 20 mg/dL    Creatinine 1.46 (H) 0.76 - 1.27 mg/dL    Sodium 140 136 - 145 mmol/L    Potassium 3.9 3.5 - 5.2 mmol/L    Chloride 102 98 - 107 mmol/L    CO2 24.0 22.0 - 29.0 mmol/L    Calcium 10.2 8.6 - 10.5 mg/dL    Total Protein 7.0 6.0 - 8.5 g/dL    Albumin 4.30 3.50 - 5.20 g/dL    ALT (SGPT) 22 1 - 41 U/L    AST (SGOT) 20 1 - 40 U/L    Alkaline Phosphatase 69 39 - 117 U/L    Total Bilirubin 0.8 0.2 - 1.2 mg/dL    eGFR Non African Amer 50 (L) >60 mL/min/1.73    Globulin 2.7 gm/dL    A/G Ratio 1.6 g/dL    BUN/Creatinine Ratio 6.8 (L) 7.0 - 25.0    Anion Gap 14.0 5.0 - 15.0 mmol/L   Lipase   Result Value Ref Range    Lipase 17 13 - 60 U/L   Urinalysis With Culture If Indicated - Urine, Clean Catch   Result Value Ref Range     Color, UA Dark Yellow (A) Yellow, Straw    Appearance, UA Clear Clear    pH, UA 6.0 5.0 - 8.0    Specific Gravity, UA 1.021 1.005 - 1.030    Glucose, UA Negative Negative    Ketones, UA Trace (A) Negative    Bilirubin, UA Negative Negative    Blood, UA Negative Negative    Protein, UA 30 mg/dL (1+) (A) Negative    Leuk Esterase, UA Negative Negative    Nitrite, UA Negative Negative    Urobilinogen, UA 0.2 E.U./dL 0.2 - 1.0 E.U./dL   CBC Auto Differential   Result Value Ref Range    WBC 9.40 3.40 - 10.80 10*3/mm3    RBC 5.15 4.14 - 5.80 10*6/mm3    Hemoglobin 17.1 13.0 - 17.7 g/dL    Hematocrit 48.3 37.5 - 51.0 %    MCV 93.8 79.0 - 97.0 fL    MCH 33.3 (H) 26.6 - 33.0 pg    MCHC 35.5 31.5 - 35.7 g/dL    RDW 12.7 12.3 - 15.4 %    RDW-SD 42.0 37.0 - 54.0 fl    MPV 9.2 6.0 - 12.0 fL    Platelets 189 140 - 450 10*3/mm3    Neutrophil % 80.2 (H) 42.7 - 76.0 %    Lymphocyte % 15.7 (L) 19.6 - 45.3 %    Monocyte % 3.2 (L) 5.0 - 12.0 %    Eosinophil % 0.3 0.3 - 6.2 %    Basophil % 0.6 0.0 - 1.5 %    Neutrophils, Absolute 7.60 (H) 1.70 - 7.00 10*3/mm3    Lymphocytes, Absolute 1.50 0.70 - 3.10 10*3/mm3    Monocytes, Absolute 0.30 0.10 - 0.90 10*3/mm3    Eosinophils, Absolute 0.00 0.00 - 0.40 10*3/mm3    Basophils, Absolute 0.10 0.00 - 0.20 10*3/mm3    nRBC 0.9 (H) 0.0 - 0.2 /100 WBC   Urinalysis, Microscopic Only - Urine, Clean Catch   Result Value Ref Range    RBC, UA 0-2 (A) None Seen /HPF    WBC, UA 3-5 (A) None Seen /HPF    Bacteria, UA None Seen None Seen /HPF    Squamous Epithelial Cells, UA None Seen None Seen, 0-2 /HPF    Hyaline Casts, UA 3-6 None Seen /LPF    Methodology Automated Microscopy    Troponin   Result Value Ref Range    Troponin T <0.010 0.000 - 0.030 ng/mL   Scan Slide   Result Value Ref Range    RBC Morphology Normal Normal    WBC Morphology Normal Normal    Platelet Morphology Normal Normal   Gold Top - SST   Result Value Ref Range    Extra Tube Hold for add-ons.      Ct Abdomen Pelvis With  "Contrast    Result Date: 3/20/2020   1. The appearance of the left kidney is consistent with almost complete infarction. There is evidence for occlusion of the left renal artery about 1 cm from its origin. 2. The appearance of the left renal vein is most likely due to decreased flow from the arterial obstruction. 3. Suspect a nonoccluding thrombus in the left gonadal vein, which empties into the portal vein.. 4. Findings were discussed with Dr. Dixon by myself.  Electronically Signed By-Alissa Aguirre On:3/20/2020 9:31 PM This report was finalized on [id number] by  Alissa Aguirre, .    Medications   sodium chloride 0.9 % flush 10 mL (has no administration in time range)   sodium chloride 0.9 % infusion  - ADS Override Pull (has no administration in time range)   heparin bolus from bag 4,500 Units (has no administration in time range)   heparin 54055 units/250 ml (100 units/ml) in D5W (has no administration in time range)   heparin bolus from bag 2,300 Units (has no administration in time range)   heparin bolus from bag 4,500 Units (has no administration in time range)   lactated ringers infusion (has no administration in time range)   ondansetron (ZOFRAN) injection 4 mg (4 mg Intravenous Given 3/20/20 1910)   promethazine (PHENERGAN) injection 12.5 mg (12.5 mg Intramuscular Given 3/20/20 1950)   lactated ringers bolus 1,000 mL (0 mL Intravenous Stopped 3/20/20 2144)   ketorolac (TORADOL) injection 15 mg (15 mg Intravenous Given 3/20/20 2052)   iopamidol (ISOVUE-370) 76 % injection 100 mL (100 mL Intravenous Given 3/20/20 2049)     /74   Pulse 59   Temp 97.6 °F (36.4 °C)   Resp 18   Ht 172.7 cm (68\")   Wt 56.7 kg (125 lb)   SpO2 97%   BMI 19.01 kg/m²                                        MDM  Number of Diagnoses or Management Options  Flank pain:   Intractable vomiting with nausea, unspecified vomiting type:   Kidney infarction (CMS/HCC):   Critical Care  Total time providing critical care: 30-74 " minutes    Chart review: No previous records available  Comorbidity: Coronary disease hyperlipidemia noncompliance tobacco use  Differential: UTI, renal colic, ureterolithiasis  My EKG interpretation: Sinus rhythm with anterolateral infarct age indeterminate with repolarization abnormality.  No previous to compare to  Lab: Urinalysis 0-2 red cells 3-5 white cells no bacteria troponin negative lipase normal comprehensive metabolic panel glucose 118 creatinine 1.46, CBC normal white count hemoglobin 17.1 platelet count 189  Radiology: I reviewed CT scan of abdomen pelvis IV contrast and discussed with radiologist.  The appearance of the left kidney consistent with almost complete infarction with evidence of occlusion of the left renal artery about 1 cm from its origin.  Appearance the left renal vein is not well opacified however it was felt that this is most likely from decreased flow from arterial obstruction.  There is suspicion of a nonoccluding thrombus in the left gonadal vein which empties into the portal vein.  Discussion/treatment: Patient received IV fluid bolus.  He was on IV fluids.  He was given Toradol and Zofran.  He had improvement in his pain with treatment.  He was started on high-dose heparin infusion.  Patient was discussed with PA for the hospitalist.  He was discussed with Dr. Luna for urology and  [facility] surgeon.  He was also discussed with Dr. mohan interventional radiology who reviewed CT scan.  He had felt that this was an embolic process.  He thought that there likely was a clot in the LV apex.  He did not feel that there was any intervention that would benefit at this point he agreed with anticoagulation which had already been initiated.  He will require echocardiogram in a.m.  Patient critical care time of 30 minutes independent of procedures.    Final diagnoses:   Kidney infarction (CMS/HCC)   Flank pain   Intractable vomiting with nausea, unspecified vomiting type             Wellington Dixon MD  03/20/20 221       Wellington Dixon MD  03/20/20 2219

## 2020-03-22 ENCOUNTER — APPOINTMENT (OUTPATIENT)
Dept: CARDIOLOGY | Facility: HOSPITAL | Age: 60
End: 2020-03-22

## 2020-03-22 VITALS
WEIGHT: 125 LBS | OXYGEN SATURATION: 95 % | HEIGHT: 68 IN | SYSTOLIC BLOOD PRESSURE: 111 MMHG | RESPIRATION RATE: 18 BRPM | DIASTOLIC BLOOD PRESSURE: 64 MMHG | TEMPERATURE: 98.7 F | HEART RATE: 58 BPM | BODY MASS INDEX: 18.94 KG/M2

## 2020-03-22 LAB
ANION GAP SERPL CALCULATED.3IONS-SCNC: 9 MMOL/L (ref 5–15)
APTT PPP: 51.7 SECONDS (ref 61–76.5)
APTT PPP: 61.4 SECONDS (ref 61–76.5)
BASOPHILS # BLD AUTO: 0.1 10*3/MM3 (ref 0–0.2)
BASOPHILS NFR BLD AUTO: 0.6 % (ref 0–1.5)
BH CV LOWER VASCULAR LEFT COMMON FEMORAL AUGMENT: NORMAL
BH CV LOWER VASCULAR LEFT COMMON FEMORAL COMPETENT: NORMAL
BH CV LOWER VASCULAR LEFT COMMON FEMORAL COMPRESS: NORMAL
BH CV LOWER VASCULAR LEFT COMMON FEMORAL PHASIC: NORMAL
BH CV LOWER VASCULAR LEFT COMMON FEMORAL SPONT: NORMAL
BH CV LOWER VASCULAR LEFT DISTAL FEMORAL COMPRESS: NORMAL
BH CV LOWER VASCULAR LEFT GASTRONEMIUS COMPRESS: NORMAL
BH CV LOWER VASCULAR LEFT GREATER SAPH AK COMPRESS: NORMAL
BH CV LOWER VASCULAR LEFT GREATER SAPH BK COMPRESS: NORMAL
BH CV LOWER VASCULAR LEFT LESSER SAPH COMPRESS: NORMAL
BH CV LOWER VASCULAR LEFT MID FEMORAL AUGMENT: NORMAL
BH CV LOWER VASCULAR LEFT MID FEMORAL COMPETENT: NORMAL
BH CV LOWER VASCULAR LEFT MID FEMORAL COMPRESS: NORMAL
BH CV LOWER VASCULAR LEFT MID FEMORAL PHASIC: NORMAL
BH CV LOWER VASCULAR LEFT MID FEMORAL SPONT: NORMAL
BH CV LOWER VASCULAR LEFT PERONEAL COMPRESS: NORMAL
BH CV LOWER VASCULAR LEFT POPLITEAL AUGMENT: NORMAL
BH CV LOWER VASCULAR LEFT POPLITEAL COMPETENT: NORMAL
BH CV LOWER VASCULAR LEFT POPLITEAL COMPRESS: NORMAL
BH CV LOWER VASCULAR LEFT POPLITEAL PHASIC: NORMAL
BH CV LOWER VASCULAR LEFT POPLITEAL SPONT: NORMAL
BH CV LOWER VASCULAR LEFT POSTERIOR TIBIAL COMPRESS: NORMAL
BH CV LOWER VASCULAR LEFT PROXIMAL FEMORAL COMPRESS: NORMAL
BH CV LOWER VASCULAR LEFT SAPHENOFEMORAL JUNCTION AUGMENT: NORMAL
BH CV LOWER VASCULAR LEFT SAPHENOFEMORAL JUNCTION COMPETENT: NORMAL
BH CV LOWER VASCULAR LEFT SAPHENOFEMORAL JUNCTION COMPRESS: NORMAL
BH CV LOWER VASCULAR LEFT SAPHENOFEMORAL JUNCTION PHASIC: NORMAL
BH CV LOWER VASCULAR LEFT SAPHENOFEMORAL JUNCTION SPONT: NORMAL
BH CV LOWER VASCULAR RIGHT COMMON FEMORAL AUGMENT: NORMAL
BH CV LOWER VASCULAR RIGHT COMMON FEMORAL COMPETENT: NORMAL
BH CV LOWER VASCULAR RIGHT COMMON FEMORAL COMPRESS: NORMAL
BH CV LOWER VASCULAR RIGHT COMMON FEMORAL PHASIC: NORMAL
BH CV LOWER VASCULAR RIGHT COMMON FEMORAL SPONT: NORMAL
BH CV LOWER VASCULAR RIGHT DISTAL FEMORAL COMPRESS: NORMAL
BH CV LOWER VASCULAR RIGHT GASTRONEMIUS COMPRESS: NORMAL
BH CV LOWER VASCULAR RIGHT GREATER SAPH AK COMPRESS: NORMAL
BH CV LOWER VASCULAR RIGHT GREATER SAPH BK COMPRESS: NORMAL
BH CV LOWER VASCULAR RIGHT LESSER SAPH COMPRESS: NORMAL
BH CV LOWER VASCULAR RIGHT MID FEMORAL AUGMENT: NORMAL
BH CV LOWER VASCULAR RIGHT MID FEMORAL COMPETENT: NORMAL
BH CV LOWER VASCULAR RIGHT MID FEMORAL COMPRESS: NORMAL
BH CV LOWER VASCULAR RIGHT MID FEMORAL PHASIC: NORMAL
BH CV LOWER VASCULAR RIGHT MID FEMORAL SPONT: NORMAL
BH CV LOWER VASCULAR RIGHT PERONEAL COMPRESS: NORMAL
BH CV LOWER VASCULAR RIGHT POPLITEAL AUGMENT: NORMAL
BH CV LOWER VASCULAR RIGHT POPLITEAL COMPETENT: NORMAL
BH CV LOWER VASCULAR RIGHT POPLITEAL COMPRESS: NORMAL
BH CV LOWER VASCULAR RIGHT POPLITEAL PHASIC: NORMAL
BH CV LOWER VASCULAR RIGHT POPLITEAL SPONT: NORMAL
BH CV LOWER VASCULAR RIGHT POSTERIOR TIBIAL COMPRESS: NORMAL
BH CV LOWER VASCULAR RIGHT PROXIMAL FEMORAL COMPRESS: NORMAL
BH CV LOWER VASCULAR RIGHT SAPHENOFEMORAL JUNCTION AUGMENT: NORMAL
BH CV LOWER VASCULAR RIGHT SAPHENOFEMORAL JUNCTION COMPETENT: NORMAL
BH CV LOWER VASCULAR RIGHT SAPHENOFEMORAL JUNCTION COMPRESS: NORMAL
BH CV LOWER VASCULAR RIGHT SAPHENOFEMORAL JUNCTION PHASIC: NORMAL
BH CV LOWER VASCULAR RIGHT SAPHENOFEMORAL JUNCTION SPONT: NORMAL
BUN BLD-MCNC: 18 MG/DL (ref 6–20)
BUN/CREAT SERPL: 9.9 (ref 7–25)
CALCIUM SPEC-SCNC: 8.6 MG/DL (ref 8.6–10.5)
CHLORIDE SERPL-SCNC: 101 MMOL/L (ref 98–107)
CO2 SERPL-SCNC: 26 MMOL/L (ref 22–29)
CREAT BLD-MCNC: 1.81 MG/DL (ref 0.76–1.27)
DEPRECATED RDW RBC AUTO: 44.6 FL (ref 37–54)
EOSINOPHIL # BLD AUTO: 0 10*3/MM3 (ref 0–0.4)
EOSINOPHIL NFR BLD AUTO: 0.1 % (ref 0.3–6.2)
ERYTHROCYTE [DISTWIDTH] IN BLOOD BY AUTOMATED COUNT: 13.1 % (ref 12.3–15.4)
GFR SERPL CREATININE-BSD FRML MDRD: 39 ML/MIN/1.73
GLUCOSE BLD-MCNC: 102 MG/DL (ref 65–99)
HCT VFR BLD AUTO: 41.2 % (ref 37.5–51)
HGB BLD-MCNC: 14.8 G/DL (ref 13–17.7)
INR PPP: 1.15 (ref 0.9–1.1)
LYMPHOCYTES # BLD AUTO: 1.5 10*3/MM3 (ref 0.7–3.1)
LYMPHOCYTES NFR BLD AUTO: 12.1 % (ref 19.6–45.3)
MAGNESIUM SERPL-MCNC: 1.7 MG/DL (ref 1.6–2.6)
MCH RBC QN AUTO: 35.1 PG (ref 26.6–33)
MCHC RBC AUTO-ENTMCNC: 36 G/DL (ref 31.5–35.7)
MCV RBC AUTO: 97.5 FL (ref 79–97)
MONOCYTES # BLD AUTO: 0.7 10*3/MM3 (ref 0.1–0.9)
MONOCYTES NFR BLD AUTO: 5.6 % (ref 5–12)
NEUTROPHILS # BLD AUTO: 9.8 10*3/MM3 (ref 1.7–7)
NEUTROPHILS NFR BLD AUTO: 81.6 % (ref 42.7–76)
NRBC BLD AUTO-RTO: 0 /100 WBC (ref 0–0.2)
PHOSPHATE SERPL-MCNC: 2.3 MG/DL (ref 2.5–4.5)
PLATELET # BLD AUTO: 139 10*3/MM3 (ref 140–450)
PMV BLD AUTO: 9.6 FL (ref 6–12)
POTASSIUM BLD-SCNC: 4.6 MMOL/L (ref 3.5–5.2)
PROTHROMBIN TIME: 11.8 SECONDS (ref 9.6–11.7)
RBC # BLD AUTO: 4.23 10*6/MM3 (ref 4.14–5.8)
SODIUM BLD-SCNC: 136 MMOL/L (ref 136–145)
WBC NRBC COR # BLD: 12 10*3/MM3 (ref 3.4–10.8)

## 2020-03-22 PROCEDURE — 63710000001 ONDANSETRON PER 8 MG: Performed by: PHYSICIAN ASSISTANT

## 2020-03-22 PROCEDURE — 80048 BASIC METABOLIC PNL TOTAL CA: CPT | Performed by: PHYSICIAN ASSISTANT

## 2020-03-22 PROCEDURE — 85730 THROMBOPLASTIN TIME PARTIAL: CPT | Performed by: HOSPITALIST

## 2020-03-22 PROCEDURE — 85025 COMPLETE CBC W/AUTO DIFF WBC: CPT | Performed by: PHYSICIAN ASSISTANT

## 2020-03-22 PROCEDURE — 84100 ASSAY OF PHOSPHORUS: CPT | Performed by: PHYSICIAN ASSISTANT

## 2020-03-22 PROCEDURE — 83735 ASSAY OF MAGNESIUM: CPT | Performed by: PHYSICIAN ASSISTANT

## 2020-03-22 PROCEDURE — 99233 SBSQ HOSP IP/OBS HIGH 50: CPT | Performed by: INTERNAL MEDICINE

## 2020-03-22 PROCEDURE — 85610 PROTHROMBIN TIME: CPT | Performed by: PHYSICIAN ASSISTANT

## 2020-03-22 PROCEDURE — 93970 EXTREMITY STUDY: CPT

## 2020-03-22 PROCEDURE — 99239 HOSP IP/OBS DSCHRG MGMT >30: CPT | Performed by: HOSPITALIST

## 2020-03-22 RX ORDER — CLOPIDOGREL BISULFATE 75 MG/1
75 TABLET ORAL DAILY
Qty: 30 TABLET | Refills: 2 | Status: SHIPPED | OUTPATIENT
Start: 2020-03-22 | End: 2020-03-23 | Stop reason: SDUPTHER

## 2020-03-22 RX ORDER — OXYCODONE HYDROCHLORIDE 5 MG/1
5 TABLET ORAL EVERY 4 HOURS PRN
Status: DISCONTINUED | OUTPATIENT
Start: 2020-03-22 | End: 2020-03-22 | Stop reason: HOSPADM

## 2020-03-22 RX ORDER — OXYCODONE HYDROCHLORIDE 5 MG/1
5 TABLET ORAL EVERY 4 HOURS PRN
Qty: 14 TABLET | Refills: 0 | Status: SHIPPED | OUTPATIENT
Start: 2020-03-22 | End: 2020-03-29

## 2020-03-22 RX ADMIN — APIXABAN 10 MG: 5 TABLET, FILM COATED ORAL at 11:56

## 2020-03-22 RX ADMIN — Medication 10 ML: at 08:58

## 2020-03-22 RX ADMIN — ONDANSETRON HYDROCHLORIDE 4 MG: 4 TABLET, FILM COATED ORAL at 18:17

## 2020-03-22 RX ADMIN — OXYCODONE HYDROCHLORIDE 5 MG: 5 TABLET ORAL at 11:55

## 2020-03-22 NOTE — PROGRESS NOTES
Continued Stay Note  MATHEW Cardenas     Patient Name: Damien Peña  MRN: [id number]  Today's Date: 3/22/2020    Admit Date: 3/20/2020    Discharge Plan     Row Name 03/22/20 1452       Plan    Plan  Routine d/c to home       Attempted  to call Pharmacy at [phone] to check price of Eliquis 5 mg BID. Spoke to Pharmacist Vida and she states she has no insurance information for patient.  Spoke to patient and he states that he just started his insurance and was told he would be receiving his card in 5 - 7 working days about 1 week ago. He will call in AM to get all numbers for his plan and CM will call pharmacy with information and check cost to patient.      Kimberly Meza RN, CM  Office Phone [phone]  Cell [phone]

## 2020-03-22 NOTE — PROGRESS NOTES
Referring Provider: Specialist    Reason for follow-up: Embolic episodes     Patient Care Team:  Provider, No Known as PCP - General    Subjective .  No chest pain or shortness of breath    Objective  Lying in bed comfortably     Review of Systems   Constitution: Negative for fever and malaise/fatigue.   HENT: Negative for ear pain and nosebleeds.    Eyes: Negative for blurred vision and double vision.   Cardiovascular: Negative for chest pain, dyspnea on exertion and palpitations.   Respiratory: Negative for cough and shortness of breath.    Skin: Negative for rash.   Musculoskeletal: Negative for joint pain.   Gastrointestinal: Negative for abdominal pain, nausea and vomiting.   Neurological: Negative for focal weakness and headaches.   Psychiatric/Behavioral: Negative for depression. The patient is not nervous/anxious.    All other systems reviewed and are negative.      Patient has no known allergies.    Scheduled Meds:    apixaban 10 mg Oral Q12H   Followed by      [START ON 3/29/2020] apixaban 5 mg Oral Q12H   sodium chloride 10 mL Intravenous Q12H     Continuous Infusions:   PRN Meds:.•  acetaminophen **OR** acetaminophen **OR** acetaminophen  •  Calcium Gluconate-NaCl **AND** calcium gluconate **AND** Calcium, Ionized  •  docusate sodium  •  influenza vaccine  •  ketamine (KETALAR) infusion **AND** Ketamine Vital Signs & Assessment  •  magnesium sulfate **OR** magnesium sulfate **OR** magnesium sulfate  •  melatonin  •  Morphine  •  nitroglycerin  •  ondansetron **OR** ondansetron  •  oxyCODONE  •  potassium & sodium phosphates **OR** potassium & sodium phosphates  •  potassium chloride  •  potassium chloride  •  [COMPLETED] Insert peripheral IV **AND** sodium chloride  •  sodium chloride        VITAL SIGNS  Vitals:    03/21/20 1055 03/21/20 1323 03/21/20 2039 03/22/20 0429   BP: 123/73 119/75 115/68 111/64   BP Location:   Right arm Right arm   Patient Position:   Lying Lying   Pulse:  52 62 58   Resp:  16  "17 18   Temp:  98.3 °F (36.8 °C) 99.8 °F (37.7 °C) 98.7 °F (37.1 °C)   TempSrc:   Oral Oral   SpO2:  96% 96% 95%   Weight: 56.7 kg (125 lb)      Height: 172.7 cm (68\")          Flowsheet Rows      First Filed Value   Admission Height  172.7 cm (68\") Documented at 03/20/2020 1845   Admission Weight  56.7 kg (125 lb) Documented at 03/20/2020 1845           TELEMETRY: Sinus rhythm    Physical Exam:  Physical Exam   Constitutional: He appears well-developed and well-nourished.   HENT:   Head: Normocephalic and atraumatic.   Eyes: Pupils are equal, round, and reactive to light. Conjunctivae and EOM are normal. No scleral icterus.   Neck: Normal range of motion. Neck supple. No JVD present. Carotid bruit is not present.   Cardiovascular: Normal rate, regular rhythm, S1 normal, S2 normal, normal heart sounds and intact distal pulses. PMI is not displaced.   Pulmonary/Chest: Effort normal and breath sounds normal. He has no wheezes. He has no rales.   Abdominal: Soft. Bowel sounds are normal.   Musculoskeletal: Normal range of motion.   Neurological: He is alert. He has normal strength.   No focal deficits   Skin: Skin is warm and dry. No rash noted.   Psychiatric: He has a normal mood and affect.        Results Review:   I reviewed the patient's new clinical results.  Lab Results (last 24 hours)     Procedure Component Value Units Date/Time    aPTT [[id number]]  (Abnormal) Collected:  03/22/20 0643    Specimen:  Blood Updated:  03/22/20 0710     PTT 51.7 seconds     Phosphorus [[id number]]  (Abnormal) Collected:  03/22/20 0031    Specimen:  Blood Updated:  03/22/20 0110     Phosphorus 2.3 mg/dL     Basic Metabolic Panel [[id number]]  (Abnormal) Collected:  03/22/20 0031    Specimen:  Blood Updated:  03/22/20 0110     Glucose 102 mg/dL      BUN 18 mg/dL      Creatinine 1.81 mg/dL      Sodium 136 mmol/L      Potassium 4.6 mmol/L      Chloride 101 mmol/L      CO2 26.0 mmol/L      Calcium 8.6 mg/dL      eGFR Non  Amer 39 " mL/min/1.73      BUN/Creatinine Ratio 9.9     Anion Gap 9.0 mmol/L     Narrative:       GFR Normal >60  Chronic Kidney Disease <60  Kidney Failure <15      Magnesium [[id number]]  (Normal) Collected:  03/22/20 0031    Specimen:  Blood Updated:  03/22/20 0106     Magnesium 1.7 mg/dL     aPTT [[id number]]  (Normal) Collected:  03/22/20 0031    Specimen:  Blood Updated:  03/22/20 0102     PTT 61.4 seconds     Protime-INR [[id number]]  (Abnormal) Collected:  03/22/20 0031    Specimen:  Blood Updated:  03/22/20 0102     Protime 11.8 Seconds      INR 1.15    CBC & Differential [[id number]] Collected:  03/22/20 0031    Specimen:  Blood Updated:  03/22/20 0046    Narrative:       The following orders were created for panel order CBC & Differential.  Procedure                               Abnormality         Status                     ---------                               -----------         ------                     CBC Auto Differential[[id number]]        Abnormal            Final result                 Please view results for these tests on the individual orders.    CBC Auto Differential [[id number]]  (Abnormal) Collected:  03/22/20 0031    Specimen:  Blood Updated:  03/22/20 0046     WBC 12.00 10*3/mm3      RBC 4.23 10*6/mm3      Hemoglobin 14.8 g/dL      Hematocrit 41.2 %      MCV 97.5 fL      MCH 35.1 pg      MCHC 36.0 g/dL      RDW 13.1 %      RDW-SD 44.6 fl      MPV 9.6 fL      Platelets 139 10*3/mm3      Neutrophil % 81.6 %      Lymphocyte % 12.1 %      Monocyte % 5.6 %      Eosinophil % 0.1 %      Basophil % 0.6 %      Neutrophils, Absolute 9.80 10*3/mm3      Lymphocytes, Absolute 1.50 10*3/mm3      Monocytes, Absolute 0.70 10*3/mm3      Eosinophils, Absolute 0.00 10*3/mm3      Basophils, Absolute 0.10 10*3/mm3      nRBC 0.0 /100 WBC     aPTT [[id number]]  (Abnormal) Collected:  03/21/20 1545    Specimen:  Blood Updated:  03/21/20 1616     PTT 55.6 seconds           Imaging Results (Last 24 Hours)     ** No results  found for the last 24 hours. **          EKG      I personally viewed and interpreted the patient's EKG/Telemetry data:    ECHOCARDIOGRAM:    STRESS MYOVIEW:    CARDIAC CATHETERIZATION:    OTHER:         Assessment/Plan     Principal Problem:    Kidney infarction (CMS/[facility])  Active Problems:    CAD (coronary artery disease)    Tobacco abuse    JOSE MANUEL (acute kidney injury) (CMS/[facility])  Left ventricular thrombus  Hypertension  Hyperlipidemia    Patient presented with abdominal pain and flank pain and had left renal artery occlusion and left kidney infarction  Patient also has acute renal failure  Patient is followed by the nephrologist and urologist  Patient had an echocardiogram which showed LV dysfunction with a left ventricular thrombus  Patient will be placed on Eliquis 5 mg twice daily with a loading dosage also  Discussed with patient about the treatment plan including the Eliquis  Blood pressure and heart rate are stable  Continue current medical therapy  I discussed the patients findings and my recommendations with patient and nurse    Pelon Ramsey MD  03/22/20  12:02

## 2020-03-22 NOTE — PLAN OF CARE
Problem: Patient Care Overview  Goal: Plan of Care Review  Outcome: Ongoing (interventions implemented as appropriate)  Flowsheets (Taken 3/22/2020 0318)  Progress: no change  Plan of Care Reviewed With: patient  Outcome Summary: Patient had an uneventful evening. Did seem to get some rest. aPTT came back therapuetic with last blood draw, no rate change. Redraw ordered for 6 hours out. Will continue to monitor.

## 2020-03-22 NOTE — PLAN OF CARE
Patient still complaining of flank pain, other medications have been added to help control pain from kidney.  Patient was taken off heparin today and started on eliquis.  Continue to monitor.

## 2020-03-22 NOTE — PROGRESS NOTES
Discharge Planning Assessment   Ronald     Patient Name: Damien Peña  MRN: [id number]  Today's Date: 3/22/2020    Admit Date: 3/20/2020    Discharge Needs Assessment     Row Name 03/22/20 1449       Living Environment    Lives With  child(ever), adult Lives with daughter     Current Living Arrangements  home/apartment/condo    Primary Care Provided by  self    Able to Return to Prior Arrangements  yes       Resource/Environmental Concerns    Resource/Environmental Concerns  none    Transportation Concerns  car, none       Transition Planning    Patient/Family Anticipates Transition to  home with family    Patient/Family Anticipated Services at Transition  none    Transportation Anticipated  car, drives self;family or friend will provide       Discharge Needs Assessment    Readmission Within the Last 30 Days  no previous admission in last 30 days    Concerns to be Addressed  no discharge needs identified    Equipment Currently Used at Home  none    Anticipated Changes Related to Illness  none    Equipment Needed After Discharge  none        Discharge Plan     Row Name 03/22/20 1452       Plan    Plan  Routine d/c to home           Demographic Summary     Row Name 03/22/20 1449       General Information    Admission Type  inpatient    Arrived From  emergency department    Referral Source  admission list    Reason for Consult  discharge planning    Preferred Language  English        Functional Status     Row Name 03/22/20 1449       Functional Status, IADL    Medications  independent    Meal Preparation  independent    Housekeeping  independent    Laundry  independent    Shopping  independent        DC Barriers - Monitor BUN and Creatinine , Heparin GTT    Kimberly Meza RN, CM  Office Phone [phone]  Cell [phone]

## 2020-03-22 NOTE — PROGRESS NOTES
LOS: 2 days   Patient Care Team:  Provider, No Known as PCP - General    Chief Complaint: Left renal artery occlusion due to cardioembolic event    Subjective     59 y.o. male with infarcted left kidney due to left renal artery occlusion.  Patient presented with occluded kidney was already nonsalvageable at the time of presentation to the ER.  His back pain is been stabilized with pain medication.  Work-up has indicated the source to be cardioembolic with a left ventricular thrombus seen and moderately decreased ejection fraction at 40%.    Review of Systems  Review of Systems - Negative except left back and flank pain      Objective     Vital Signs  Temp:  [98.7 °F (37.1 °C)-99.8 °F (37.7 °C)] 98.7 °F (37.1 °C)  Heart Rate:  [58-62] 58  Resp:  [17-18] 18  BP: (111-115)/(64-68) 111/64    Physical Exam  General: No acute distress. Alert and oriented x 4  HEENT: No jugular venous distension, trachea is midline  CV: RRR, S1S2  Resp: Clear unlabored breathing on both sides  Abd: Abdomen is soft, nontender, nondistended  Mild costovertebral angle tenderness  Extremities: Viable with +2 pulses    Results Review:       Recent Results (from the past 24 hour(s))   aPTT    Collection Time: 03/21/20  3:45 PM   Result Value Ref Range    PTT 55.6 (L) 61.0 - 76.5 seconds   aPTT    Collection Time: 03/22/20 12:31 AM   Result Value Ref Range    PTT 61.4 61.0 - 76.5 seconds   CBC Auto Differential    Collection Time: 03/22/20 12:31 AM   Result Value Ref Range    WBC 12.00 (H) 3.40 - 10.80 10*3/mm3    RBC 4.23 4.14 - 5.80 10*6/mm3    Hemoglobin 14.8 13.0 - 17.7 g/dL    Hematocrit 41.2 37.5 - 51.0 %    MCV 97.5 (H) 79.0 - 97.0 fL    MCH 35.1 (H) 26.6 - 33.0 pg    MCHC 36.0 (H) 31.5 - 35.7 g/dL    RDW 13.1 12.3 - 15.4 %    RDW-SD 44.6 37.0 - 54.0 fl    MPV 9.6 6.0 - 12.0 fL    Platelets 139 (L) 140 - 450 10*3/mm3    Neutrophil % 81.6 (H) 42.7 - 76.0 %    Lymphocyte % 12.1 (L) 19.6 - 45.3 %    Monocyte % 5.6 5.0 - 12.0 %     Eosinophil % 0.1 (L) 0.3 - 6.2 %    Basophil % 0.6 0.0 - 1.5 %    Neutrophils, Absolute 9.80 (H) 1.70 - 7.00 10*3/mm3    Lymphocytes, Absolute 1.50 0.70 - 3.10 10*3/mm3    Monocytes, Absolute 0.70 0.10 - 0.90 10*3/mm3    Eosinophils, Absolute 0.00 0.00 - 0.40 10*3/mm3    Basophils, Absolute 0.10 0.00 - 0.20 10*3/mm3    nRBC 0.0 0.0 - 0.2 /100 WBC   Basic Metabolic Panel    Collection Time: 03/22/20 12:31 AM   Result Value Ref Range    Glucose 102 (H) 65 - 99 mg/dL    BUN 18 6 - 20 mg/dL    Creatinine 1.81 (H) 0.76 - 1.27 mg/dL    Sodium 136 136 - 145 mmol/L    Potassium 4.6 3.5 - 5.2 mmol/L    Chloride 101 98 - 107 mmol/L    CO2 26.0 22.0 - 29.0 mmol/L    Calcium 8.6 8.6 - 10.5 mg/dL    eGFR Non African Amer 39 (L) >60 mL/min/1.73    BUN/Creatinine Ratio 9.9 7.0 - 25.0    Anion Gap 9.0 5.0 - 15.0 mmol/L   Magnesium    Collection Time: 03/22/20 12:31 AM   Result Value Ref Range    Magnesium 1.7 1.6 - 2.6 mg/dL   Phosphorus    Collection Time: 03/22/20 12:31 AM   Result Value Ref Range    Phosphorus 2.3 (L) 2.5 - 4.5 mg/dL   Protime-INR    Collection Time: 03/22/20 12:31 AM   Result Value Ref Range    Protime 11.8 (H) 9.6 - 11.7 Seconds    INR 1.15 (H) 0.90 - 1.10   aPTT    Collection Time: 03/22/20  6:43 AM   Result Value Ref Range    PTT 51.7 (L) 61.0 - 76.5 seconds   Duplex Venous Lower Extremity - Bilateral CAR    Collection Time: 03/22/20 11:07 AM   Result Value Ref Range    Right Common Femoral Spont Y     Right Common Femoral Phasic Y     Right Common Femoral Augment Y     Right Common Femoral Competent Y     Right Common Femoral Compress C     Right Saphenofemoral Junction Spont Y     Right Saphenofemoral Junction Phasic Y     Right Saphenofemoral Junction Augment Y     Right Saphenofemoral Junction Competent Y     Right Saphenofemoral Junction Compress C     Right Proximal Femoral Compress C     Right Mid Femoral Spont Y     Right Mid Femoral Phasic Y     Right Mid Femoral Augment Y     Right Mid Femoral  Competent Y     Right Mid Femoral Compress C     Right Distal Femoral Compress C     Right Popliteal Spont Y     Right Popliteal Phasic Y     Right Popliteal Augment Y     Right Popliteal Competent Y     Right Popliteal Compress C     Right Posterior Tibial Compress C     Right Peroneal Compress C     Right GastronemiusSoleal Compress C     Right Greater Saph AK Compress C     Right Greater Saph BK Compress C     Right Lesser Saph Compress C     Left Common Femoral Spont Y     Left Common Femoral Phasic Y     Left Common Femoral Augment Y     Left Common Femoral Competent Y     Left Common Femoral Compress C     Left Saphenofemoral Junction Spont Y     Left Saphenofemoral Junction Phasic Y     Left Saphenofemoral Junction Augment Y     Left Saphenofemoral Junction Competent Y     Left Saphenofemoral Junction Compress C     Left Proximal Femoral Compress C     Left Mid Femoral Spont Y     Left Mid Femoral Phasic Y     Left Mid Femoral Augment Y     Left Mid Femoral Competent Y     Left Mid Femoral Compress C     Left Distal Femoral Compress C     Left Popliteal Spont Y     Left Popliteal Phasic Y     Left Popliteal Augment Y     Left Popliteal Competent Y     Left Popliteal Compress C     Left Posterior Tibial Compress C     Left Peroneal Compress C     Left GastronemiusSoleal Compress C     Left Greater Saph AK Compress C     Left Greater Saph BK Compress C     Left Lesser Saph Compress C    ]      Assessment/Plan             Kidney infarction (CMS/HCC)    CAD (coronary artery disease)    Tobacco abuse    JOSE MANUEL (acute kidney injury) (CMS/HCC)      Assessment & Plan  59 y.o. male with cardioembolic left renal artery occlusion.  Kidney is nonsalvageable.  Pain is being managed.  Would recommend against resection of the kidney or attempts at opening the artery.  Lytics are absolutely contraindicated with the mobile thrombus in the apex of the left ventricle as it would likely shower further areas.  He likely has had no  other stigmata of embolic disease.  At this point time I agree with the use of Eliquis as his best option for full anticoagulation given his injured kidney reserve.  I will defer further management completely to cardiology as there is no significant vascular disease for us to deal with.  I have discussed in detail with the patient and his family what to look for with regards to other embolic events such as stroke, mesenteric malperfusion and/or limb ischemia.  At this point time we will sign off please call for other issues.      Timmy Bourgeois MD  03/22/20  14:25

## 2020-03-23 ENCOUNTER — OFFICE VISIT (OUTPATIENT)
Dept: CARDIOLOGY | Facility: CLINIC | Age: 60
End: 2020-03-23

## 2020-03-23 VITALS
BODY MASS INDEX: 20.76 KG/M2 | HEIGHT: 68 IN | WEIGHT: 137 LBS | DIASTOLIC BLOOD PRESSURE: 72 MMHG | SYSTOLIC BLOOD PRESSURE: 118 MMHG | HEART RATE: 66 BPM

## 2020-03-23 DIAGNOSIS — I25.118 CORONARY ARTERY DISEASE OF NATIVE ARTERY OF NATIVE HEART WITH STABLE ANGINA PECTORIS (HCC): Primary | ICD-10-CM

## 2020-03-23 DIAGNOSIS — I25.10 CORONARY ARTERY DISEASE INVOLVING NATIVE CORONARY ARTERY OF NATIVE HEART WITHOUT ANGINA PECTORIS: ICD-10-CM

## 2020-03-23 DIAGNOSIS — N28.0 RENAL ARTERY THROMBOSIS (HCC): ICD-10-CM

## 2020-03-23 DIAGNOSIS — R00.1 BRADYCARDIA, SINUS: ICD-10-CM

## 2020-03-23 DIAGNOSIS — I42.8 NON-ISCHEMIC CARDIOMYOPATHY (HCC): ICD-10-CM

## 2020-03-23 PROCEDURE — 99214 OFFICE O/P EST MOD 30 MIN: CPT | Performed by: INTERNAL MEDICINE

## 2020-03-23 RX ORDER — LISINOPRIL 2.5 MG/1
2.5 TABLET ORAL DAILY
Qty: 90 TABLET | Refills: 3 | Status: SHIPPED | OUTPATIENT
Start: 2020-03-23 | End: 2020-04-08 | Stop reason: SDUPTHER

## 2020-03-23 RX ORDER — CLOPIDOGREL BISULFATE 75 MG/1
75 TABLET ORAL DAILY
Qty: 90 TABLET | Refills: 2 | Status: SHIPPED | OUTPATIENT
Start: 2020-03-23 | End: 2020-04-08 | Stop reason: SDUPTHER

## 2020-03-23 NOTE — PROGRESS NOTES
Date of Office Visit: 2020  Encounter Provider: Dr. Ra Campos  Place of Service: [facility] CARDIOLOGY [place]  Patient Name: Damien Peña  :1960  Provider, No Known    Chief Complaint   Patient presents with   • Coronary Artery Disease     hospital follow up   • Slow Heart Rate     History of Present Illness:    I am pleased to see Mr. Charlton in my office today as a new consultation.    As you know, patient is 59-year-old white gentleman whose past medical history significant for CAD, coronary artery stenting, cardiomyopathy, renal artery thromboembolism, who came today to establish his cardiac care after his recent hospital admission and discharge.    In 2020, patient was admitted at [facility] with left flank pain and was found to have left renal artery thrombus causing left kidney infarction.  Patient underwent echocardiogram which showed EF of 45 to 50%.  Apical thrombus was noted in left ventricle.  Patient had wall motion abnormality involving apex and septum.  Patient was started on Eliquis.  Because of his history of coronary artery stenting in 2018, patient was on aspirin and Plavix.    Since the discharge, patient is doing fairly well.  He did not have any symptom of chest pain or tightness or heaviness.  No orthopnea, PND, syncope or presyncope.  No leg edema noted.  No palpitation.    Patient is clinically stable.  Patient is not in congestive heart failure having angina pectoris.  At this stage, I would recommend to continue current treatment.  I would add lisinopril 2.5 mg daily.  I would repeat echocardiogram in 6 to 12 months.  I will see the patient in 3 months.  Patient would need probable ischemic evaluation in future.        Past Medical History:   Diagnosis Date   • Bradycardia    • CAD (coronary artery disease)    • Chronic kidney disease    • Tobacco abuse          Past Surgical History:   Procedure Laterality Date   • APPENDECTOMY     • CARDIAC  CATHETERIZATION     • COLONOSCOPY     • CORONARY STENT PLACEMENT     • ECHO - CONVERTED  2020           Current Outpatient Medications:   •  [START ON 3/29/2020] apixaban (ELIQUIS) 5 MG tablet tablet, Take 1 tablet by mouth Every 12 (Twelve) Hours. Indications: DVT/PE (active thrombosis), Disp: 60 tablet, Rfl: 2  •  aspirin 81 MG tablet, Take 1 tablet by mouth Daily., Disp: 30 tablet, Rfl: 2  •  clopidogrel (Plavix) 75 MG tablet, Take 1 tablet by mouth Daily., Disp: 90 tablet, Rfl: 2  •  oxyCODONE (ROXICODONE) 5 MG immediate release tablet, Take 1 tablet by mouth Every 4 (Four) Hours As Needed for Moderate Pain  for up to 7 days., Disp: 14 tablet, Rfl: 0  •  lisinopril (PRINIVIL,ZESTRIL) 2.5 MG tablet, Take 1 tablet by mouth Daily., Disp: 90 tablet, Rfl: 3  No current facility-administered medications for this visit.       Social History     Socioeconomic History   • Marital status: Unknown     Spouse name: Not on file   • Number of children: Not on file   • Years of education: Not on file   • Highest education level: Not on file   Tobacco Use   • Smoking status: Current Every Day Smoker     Packs/day: 1.50     Years: 30.00     Pack years: 45.00     Types: Cigarettes   • Smokeless tobacco: Never Used   Substance and Sexual Activity   • Alcohol use: Yes     Comment: beer occasionally   • Drug use: Never   • Sexual activity: Defer         Review of Systems   Constitution: Negative for chills and fever.   HENT: Negative for ear discharge and nosebleeds.    Eyes: Negative for discharge and redness.   Cardiovascular: Negative for chest pain, orthopnea, palpitations, paroxysmal nocturnal dyspnea and syncope.   Respiratory: Positive for shortness of breath. Negative for cough and wheezing.    Endocrine: Negative for heat intolerance.   Skin: Negative for rash.   Musculoskeletal: Positive for arthritis and joint pain. Negative for myalgias.   Gastrointestinal: Negative for abdominal pain, melena, nausea and vomiting.  "  Genitourinary: Negative for dysuria and hematuria.   Neurological: Negative for dizziness, light-headedness, numbness and tremors.   Psychiatric/Behavioral: Negative for depression. The patient is not nervous/anxious.        Procedures    Procedures    No orders to display           Objective:    /72   Pulse 66   Ht 172.7 cm (67.99\")   Wt 62.1 kg (137 lb)   BMI 20.84 kg/m²         Physical Exam   Constitutional: He is oriented to person, place, and time. He appears well-developed and well-nourished.   HENT:   Head: Normocephalic and atraumatic.   Eyes: No scleral icterus.   Neck: No thyromegaly present.   Cardiovascular: Normal rate, regular rhythm and normal heart sounds. Exam reveals no gallop and no friction rub.   No murmur heard.  Pulmonary/Chest: Effort normal and breath sounds normal. No respiratory distress. He has no wheezes. He has no rales.   Abdominal: There is no tenderness.   Musculoskeletal: He exhibits no edema.   Lymphadenopathy:     He has no cervical adenopathy.   Neurological: He is alert and oriented to person, place, and time.   Skin: No rash noted. No erythema.   Psychiatric: He has a normal mood and affect.           Assessment:       Diagnosis Plan   1. Coronary artery disease of native artery of native heart with stable angina pectoris (CMS/HCC)  lisinopril (PRINIVIL,ZESTRIL) 2.5 MG tablet    apixaban (ELIQUIS) 5 MG tablet tablet    clopidogrel (Plavix) 75 MG tablet   2. Bradycardia, sinus  lisinopril (PRINIVIL,ZESTRIL) 2.5 MG tablet    apixaban (ELIQUIS) 5 MG tablet tablet    clopidogrel (Plavix) 75 MG tablet   3. Non-ischemic cardiomyopathy (CMS/HCC)  lisinopril (PRINIVIL,ZESTRIL) 2.5 MG tablet    apixaban (ELIQUIS) 5 MG tablet tablet    clopidogrel (Plavix) 75 MG tablet   4. Coronary artery disease involving native coronary artery of native heart without angina pectoris  lisinopril (PRINIVIL,ZESTRIL) 2.5 MG tablet    apixaban (ELIQUIS) 5 MG tablet tablet    clopidogrel " (Plavix) 75 MG tablet   5. Renal artery thrombosis (CMS/HCC)  lisinopril (PRINIVIL,ZESTRIL) 2.5 MG tablet    apixaban (ELIQUIS) 5 MG tablet tablet    clopidogrel (Plavix) 75 MG tablet            Plan:       At this stage, I would recommend that patient should proceed with lisinopril,.  Eliquis and Plavix would be continued.  I would consider ischemic evaluation in future.  Exercise is recommended.  Patient is advised to increase aerobic activity.  Patient is also recommended to have a PCP.

## 2020-03-23 NOTE — PROGRESS NOTES
Case Management Discharge Note      Final Note: Home self care              Final Discharge Disposition Code: 01 - home or self-care

## 2020-03-23 NOTE — DISCHARGE SUMMARY
[facility] Medicine Services  DISCHARGE SUMMARY        Prepared For PCP:  Provider, No Known    Patient Name: Damien Peña  : 1960  MRN: [id number]      Date of Admission:   3/20/2020    Date of Discharge:  3/22/2020     Length of stay:  LOS: 2 days     Hospital Course     Presenting Problem:   Kidney infarction (CMS/HCC) [N28.0]  Flank pain [R10.9]  Intractable vomiting with nausea, unspecified vomiting type [R11.2]      Active Hospital Problems    Diagnosis  POA   • **Kidney infarction (CMS/HCC) [N28.0]  Yes   • JOSE MANUEL (acute kidney injury) (CMS/HCC) [N17.9]  Yes   • CAD (coronary artery disease) [I25.10]  Yes   • Tobacco abuse [Z72.0]  Yes      Resolved Hospital Problems   No resolved problems to display.       Hospital Course:  Damien Peña is a 59 y.o. male admitted with:     1.  Kidney infarction due to renal artery occlusion- likely secondary to cardio embolic source, especially in setting of patient with previous history of reported PCI status post stent and nonadherence with antiplatelet therapy on outpatient basis. CT of the abdomen shows the appearance of the left kidney consistent with complete infarction and evidence of occlusion of the left renal artery, patient with likely non-functioning kidney at this time.      - Cardiology consult, appreciate recommendations in regards to anticoagulation and cardioembolic work-up.  - TTE showing left ventricular thrombus   - Started on Eliquis per cardiology recommendations   -Vascular surgery, urology and interventional radiology consulted in ED, appreciate recommendations   -Heparin started in ED, continue  -JOSE MANUEL noted addressed as below  -Monitor I's and O's     2.  JOSE MANUEL  -Creatinine: 1.46, BUN: 10, EGFR: 50  -Likely secondary to prerenal cause and IV dye noted above  -Monitor BMP  -Avoid nephrotoxic medication and further IV dye unless urgently needed  -Will consider nephrology consult if JOSE MANUEL worsens     3.  CAD  -Patient  "has a history of CAD status post stenting states he is supposed to \"take some medicine\" but that he is noncompliant.  Patient does report some radiation of his flank and abdominal pain into his chest  -Troponin less than 0.010, trend  -EKG shows sinus bradycardia without obvious ST changes or ectopy  -Patient currently on heparin as above, continue  -Continue cardiac monitoring     4.  Tobacco abuse  -Patient states he is currently 1-1/2 pack a day smoker  -Discussed cessation especially in light of patient's comorbid conditions  -Nicotine patch ordered      Recommendation for Outpatient Providers:         Reasons For Change In Medications and Indications for New Medications:  -Patient re-started on aspirin and plavix while inpatient, was non-adherent with these medications on an outpatient basis. Was started on Eliquis for left ventricular thrombus as well. Has a follow-up appointment with Dr. Campos on outpatient basis to follow-up.     Day of Discharge     HPI:   No complaints     Vital Signs:   Vitals:    03/22/20 0429   BP: 111/64   Pulse: 58   Resp: 18   Temp: 98.7 °F (37.1 °C)   SpO2: 95%     Stable    Physical Exam:  Physical Exam  Unchanged from previously documented physical exam by hospitalist provider     Pertinent  and/or Most Recent Results     Results from last 7 days   Lab Units 03/22/20  0031 03/21/20  0257 03/20/20  1911   WBC 10*3/mm3 12.00* 11.20* 9.40   HEMOGLOBIN g/dL 14.8 15.4 17.1   HEMATOCRIT % 41.2 42.2 48.3   PLATELETS 10*3/mm3 139* 167 189   SODIUM mmol/L 136 140 140   POTASSIUM mmol/L 4.6 4.3 3.9   CHLORIDE mmol/L 101 103 102   CO2 mmol/L 26.0 26.0 24.0   BUN mg/dL 18 12 10   CREATININE mg/dL 1.81* 1.81* 1.46*   GLUCOSE mg/dL 102* 129* 118*   CALCIUM mg/dL 8.6 9.3 10.2     Results from last 7 days   Lab Units 03/22/20  0643 03/22/20  0031 03/21/20  1545 03/21/20  1008 03/21/20  0257 03/20/20  2145 03/20/20  1911   BILIRUBIN mg/dL  --   --   --   --  1.0  --  0.8   ALK PHOS U/L  --   --   " --   --  57  --  69   ALT (SGPT) U/L  --   --   --   --  25  --  22   AST (SGOT) U/L  --   --   --   --  28  --  20   PROTIME Seconds  --  11.8*  --   --  11.4 11.2  --    INR   --  1.15*  --   --  1.11* 1.08  --    APTT seconds 51.7* 61.4 55.6* 65.2 85.3* 21.1*  --            Invalid input(s): TG, LDLCALC, LDLREALC  Results from last 7 days   Lab Units 03/21/20  1008 03/21/20  0257 03/20/20  1911   PROBNP pg/mL  --  763.1  --    TROPONIN T ng/mL <0.010 <0.010 <0.010       Brief Urine Lab Results  (Last result in the past 365 days)      Color   Clarity   Blood   Leuk Est   Nitrite   Protein   CREAT   Urine HCG        03/20/20 1915 Dark Yellow Clear Negative Negative Negative 30 mg/dL (1+)               Microbiology Results Abnormal     None          Ct Abdomen Pelvis With Contrast    Addendum Date: 3/20/2020    This is an addendum to the CT abdomen pelvis from 03/20/2020. There are at least 2 areas in the apical portion of the lumen of the left ventricle suggesting possible thrombi. This was discussed with Dr. Sandra and Dr Dixon.  Electronically Signed ByDex Aguirre On:3/20/2020 10:21 PM This report was finalized on [id number] by  Alissa Aguirre, .    Result Date: 3/20/2020  Impression:  1. The appearance of the left kidney is consistent with almost complete infarction. There is evidence for occlusion of the left renal artery about 1 cm from its origin. 2. The appearance of the left renal vein is most likely due to decreased flow from the arterial obstruction. 3. Suspect a nonoccluding thrombus in the left gonadal vein, which empties into the portal vein.. 4. Findings were discussed with Dr. Dixon by myself.  Electronically Signed ByDex Aguirre On:3/20/2020 9:31 PM This report was finalized on [id number] by  Alissa Aguirre, .      Results for orders placed during the hospital encounter of 03/20/20   Duplex Venous Lower Extremity - Bilateral CAR    Narrative · Normal bilateral lower extremity venous duplex  scan.          Results for orders placed during the hospital encounter of 03/20/20   Duplex Venous Lower Extremity - Bilateral CAR    Narrative · Normal bilateral lower extremity venous duplex scan.          Results for orders placed during the hospital encounter of 03/20/20   Adult Transthoracic Echo Complete W/ Cont if Necessary Per Protocol    Narrative · A moderate sized left ventricular thrombus is present. The left   ventricular thrombus is located in the apex.  · Mild mitral valve regurgitation is present  · Mild tricuspid valve regurgitation is present.  · The following left ventricular wall segments are hypokinetic: apical   anterior, apical septal and apex hypokinetic.  · LV ejection fraction is about 45 to 50%  · No pericardial effusion noted                  Test Results Pending at Discharge        Procedures Performed           Consults:   Consults     Date and Time Order Name Status Description    3/20/2020 2141 Urology (on-call MD unless specified) Completed     3/20/2020 2134 Hospitalist (on-call MD unless specified) Completed     3/20/2020 2131 Vascular Surgery (on-call MD unless specified) Completed             Discharge Details        Discharge Medications      New Medications      Instructions Start Date   apixaban 5 MG tablet tablet  Commonly known as:  ELIQUIS   10 mg, Oral, Every 12 Hours Scheduled      apixaban 5 MG tablet tablet  Commonly known as:  ELIQUIS   5 mg, Oral, Every 12 Hours Scheduled   Start Date:  March 29, 2020     aspirin 81 MG tablet   81 mg, Oral, Daily      clopidogrel 75 MG tablet  Commonly known as:  Plavix   75 mg, Oral, Daily      oxyCODONE 5 MG immediate release tablet  Commonly known as:  ROXICODONE   5 mg, Oral, Every 4 Hours PRN             No Known Allergies      Discharge Disposition:  Home or Self Care    Diet:  Hospital:No active diet order        Discharge Activity:     Activity as tolerated     CODE STATUS:    Code Status and Medical Interventions:   Ordered  at: 03/20/20 2223     Level Of Support Discussed With:    Patient     Code Status:    CPR     Medical Interventions (Level of Support Prior to Arrest):    Full         Follow-up Appointments  Future Appointments   Date Time Provider Department Center   3/23/2020  9:30 AM Ra Campos MD [facility]             Condition on Discharge:      Stable          Electronically signed by Florecita Fuentes MD, 03/23/20, 7:26 AM.    Time: I spent  >45  minutes on this discharge activity which included face-to-face encounter with the patient/reviewing the data in the system/coordination of the care with the nursing staff as well as consultants/documentation/entering orders.

## 2020-04-08 ENCOUNTER — TELEPHONE (OUTPATIENT)
Dept: CARDIOLOGY | Facility: CLINIC | Age: 60
End: 2020-04-08

## 2020-04-08 DIAGNOSIS — N28.0 RENAL ARTERY THROMBOSIS (HCC): ICD-10-CM

## 2020-04-08 DIAGNOSIS — I42.8 NON-ISCHEMIC CARDIOMYOPATHY (HCC): ICD-10-CM

## 2020-04-08 DIAGNOSIS — I25.10 CORONARY ARTERY DISEASE INVOLVING NATIVE CORONARY ARTERY OF NATIVE HEART WITHOUT ANGINA PECTORIS: ICD-10-CM

## 2020-04-08 DIAGNOSIS — R00.1 BRADYCARDIA, SINUS: ICD-10-CM

## 2020-04-08 DIAGNOSIS — I25.118 CORONARY ARTERY DISEASE OF NATIVE ARTERY OF NATIVE HEART WITH STABLE ANGINA PECTORIS (HCC): ICD-10-CM

## 2020-04-08 RX ORDER — CLOPIDOGREL BISULFATE 75 MG/1
75 TABLET ORAL DAILY
Qty: 90 TABLET | Refills: 2 | Status: SHIPPED | OUTPATIENT
Start: 2020-04-08 | End: 2021-05-04

## 2020-04-08 RX ORDER — LISINOPRIL 2.5 MG/1
2.5 TABLET ORAL DAILY
Qty: 90 TABLET | Refills: 3 | Status: SHIPPED | OUTPATIENT
Start: 2020-04-08 | End: 2021-05-03

## 2020-04-08 NOTE — TELEPHONE ENCOUNTER
Patient called and needs his prescriptions refilled  Apixaban ( Eliquis) 5 mg  Clopidogrel (Plavix) 75 mg  Lisinopril (Prinivil, Zestril) 2.5 mg     And he would like to have these in a 90 day supply.

## 2020-04-27 ENCOUNTER — TELEPHONE (OUTPATIENT)
Dept: CARDIOLOGY | Facility: CLINIC | Age: 60
End: 2020-04-27

## 2020-06-29 ENCOUNTER — OFFICE VISIT (OUTPATIENT)
Dept: CARDIOLOGY | Facility: CLINIC | Age: 60
End: 2020-06-29

## 2020-06-29 VITALS
SYSTOLIC BLOOD PRESSURE: 122 MMHG | HEART RATE: 72 BPM | BODY MASS INDEX: 18.79 KG/M2 | HEIGHT: 68 IN | WEIGHT: 124 LBS | DIASTOLIC BLOOD PRESSURE: 82 MMHG

## 2020-06-29 DIAGNOSIS — R00.1 BRADYCARDIA, SINUS: ICD-10-CM

## 2020-06-29 DIAGNOSIS — I25.10 CORONARY ARTERY DISEASE INVOLVING NATIVE CORONARY ARTERY OF NATIVE HEART WITHOUT ANGINA PECTORIS: Primary | ICD-10-CM

## 2020-06-29 DIAGNOSIS — I23.6: ICD-10-CM

## 2020-06-29 DIAGNOSIS — I20.9 ANGINA PECTORIS (HCC): ICD-10-CM

## 2020-06-29 PROCEDURE — 99214 OFFICE O/P EST MOD 30 MIN: CPT | Performed by: INTERNAL MEDICINE

## 2020-06-29 NOTE — PROGRESS NOTES
Date of Office Visit: 2020  Encounter Provider: Dr. Ra Campos  Place of Service: [facility] CARDIOLOGY [place]  Patient Name: Damien Peña  :1960  Provider, No Known    Chief Complaint   Patient presents with   • Coronary Artery Disease     follow up  3 month    • Slow Heart Rate     History of Present Illness    I am pleased to see Mr. Charlton in my office today as a follow-up.    As you know, patient is 59-year-old white gentleman whose past medical history significant for CAD, coronary artery stenting, cardiomyopathy, renal artery thromboembolism, who came today for follow-up.    In , patient was diagnosed with CAD with acute coronary artery syndrome.  Patient underwent coronary artery stenting.  Patient had similar symptoms in  and had repeat cardiac catheterization and angioplasty.  Details of these procedures are not available to me.    In 2020, patient was admitted at [facility] with left flank pain and was found to have left renal artery thrombus causing left kidney infarction.  Patient underwent echocardiogram which showed EF of 45 to 50%.  Apical thrombus was noted in left ventricle.  Patient had wall motion abnormality involving apex and septum.  Patient was started on Eliquis.    On previous visit, patient was started on Eliquis and Plavix.  Patient came today for follow-up.  Patient overall is stable.  Patient is tolerating medication well.  Patient does complain of occasional chest pain described as a dull ache in retrosternal region.  Patient usually has this when he is doing heavy activity.  Patient denies any orthopnea PND no congestive heart failure symptoms.  No palpitation.  Patient does complain of shortness of breath.    At this stage I will recommend to repeat echocardiogram to assess the resolution of LV clot.  I would also proceed with stress test to assess ischemia.  I will see the patient in 2 months    Past Medical History:   Diagnosis  Date   • Bradycardia    • CAD (coronary artery disease)    • Chronic kidney disease    • Tobacco abuse          Past Surgical History:   Procedure Laterality Date   • APPENDECTOMY     • CARDIAC CATHETERIZATION     • COLONOSCOPY     • CORONARY STENT PLACEMENT     • ECHO - CONVERTED  2020           Current Outpatient Medications:   •  apixaban (ELIQUIS) 5 MG tablet tablet, Take 1 tablet by mouth Every 12 (Twelve) Hours. Indications: DVT/PE (active thrombosis), Disp: 180 tablet, Rfl: 2  •  aspirin 81 MG tablet, Take 1 tablet by mouth Daily., Disp: 30 tablet, Rfl: 2  •  clopidogrel (Plavix) 75 MG tablet, Take 1 tablet by mouth Daily., Disp: 90 tablet, Rfl: 2  •  lisinopril (PRINIVIL,ZESTRIL) 2.5 MG tablet, Take 1 tablet by mouth Daily., Disp: 90 tablet, Rfl: 3      Social History     Socioeconomic History   • Marital status: Unknown     Spouse name: Not on file   • Number of children: Not on file   • Years of education: Not on file   • Highest education level: Not on file   Tobacco Use   • Smoking status: Current Every Day Smoker     Packs/day: 1.50     Years: 30.00     Pack years: 45.00     Types: Cigarettes   • Smokeless tobacco: Never Used   • Tobacco comment: Patient is advised to quit smoking and he has already stopped smoking from last 3 days.   Substance and Sexual Activity   • Alcohol use: Yes     Comment: beer occasionally   • Drug use: Never   • Sexual activity: Defer         Review of Systems   Constitution: Negative for chills and fever.   HENT: Negative for ear discharge and nosebleeds.    Eyes: Negative for discharge and redness.   Cardiovascular: Positive for chest pain. Negative for orthopnea, palpitations, paroxysmal nocturnal dyspnea and syncope.   Respiratory: Positive for shortness of breath. Negative for cough and wheezing.    Endocrine: Negative for heat intolerance.   Skin: Negative for rash.   Musculoskeletal: Negative for arthritis and myalgias.   Gastrointestinal: Negative for abdominal pain,  "melena, nausea and vomiting.   Genitourinary: Negative for dysuria and hematuria.   Neurological: Negative for dizziness, light-headedness, numbness and tremors.   Psychiatric/Behavioral: Negative for depression. The patient is not nervous/anxious.        Procedures    Procedures    No orders to display           Objective:    /82   Pulse 72   Ht 172.7 cm (67.99\")   Wt 56.2 kg (124 lb)   BMI 18.86 kg/m²         Physical Exam   Constitutional: He is oriented to person, place, and time. He appears well-developed and well-nourished.   HENT:   Head: Normocephalic and atraumatic.   Eyes: No scleral icterus.   Neck: No thyromegaly present.   Cardiovascular: Normal rate, regular rhythm and normal heart sounds. Exam reveals no gallop and no friction rub.   No murmur heard.  Pulmonary/Chest: Effort normal and breath sounds normal. No respiratory distress. He has no wheezes. He has no rales.   Abdominal: There is no tenderness.   Musculoskeletal: He exhibits no edema.   Lymphadenopathy:     He has no cervical adenopathy.   Neurological: He is alert and oriented to person, place, and time.   Skin: No rash noted. No erythema.   Psychiatric: He has a normal mood and affect.           Assessment:       Diagnosis Plan   1. Coronary artery disease involving native coronary artery of native heart without angina pectoris  Adult Transthoracic Echo Complete W/ Cont if Necessary Per Protocol    Stress Test With Myocardial Perfusion One Day   2. Bradycardia, sinus  Adult Transthoracic Echo Complete W/ Cont if Necessary Per Protocol    Stress Test With Myocardial Perfusion One Day   3. Mural thrombus of left ventricle following acute myocardial infarction (CMS/HCC)  Adult Transthoracic Echo Complete W/ Cont if Necessary Per Protocol    Stress Test With Myocardial Perfusion One Day   4. Angina pectoris (CMS/HCC)  Adult Transthoracic Echo Complete W/ Cont if Necessary Per Protocol    Stress Test With Myocardial Perfusion One Day "            Plan:

## 2020-07-16 ENCOUNTER — APPOINTMENT (OUTPATIENT)
Dept: NUCLEAR MEDICINE | Facility: HOSPITAL | Age: 60
End: 2020-07-16

## 2020-09-09 ENCOUNTER — HOSPITAL ENCOUNTER (OUTPATIENT)
Dept: NUCLEAR MEDICINE | Facility: HOSPITAL | Age: 60
Discharge: HOME | End: 2020-09-09

## 2020-09-09 ENCOUNTER — APPOINTMENT (OUTPATIENT)
Dept: NUCLEAR MEDICINE | Facility: HOSPITAL | Age: 60
End: 2020-09-09

## 2020-09-09 ENCOUNTER — HOSPITAL ENCOUNTER (OUTPATIENT)
Dept: CARDIOLOGY | Facility: HOSPITAL | Age: 60
Discharge: HOME | End: 2020-09-09
Admitting: INTERNAL MEDICINE

## 2020-09-09 VITALS
BODY MASS INDEX: 18.94 KG/M2 | SYSTOLIC BLOOD PRESSURE: 113 MMHG | HEIGHT: 68 IN | DIASTOLIC BLOOD PRESSURE: 73 MMHG | WEIGHT: 125 LBS

## 2020-09-09 DIAGNOSIS — I23.6: ICD-10-CM

## 2020-09-09 DIAGNOSIS — R00.1 BRADYCARDIA, SINUS: ICD-10-CM

## 2020-09-09 DIAGNOSIS — I25.10 CORONARY ARTERY DISEASE INVOLVING NATIVE CORONARY ARTERY OF NATIVE HEART WITHOUT ANGINA PECTORIS: ICD-10-CM

## 2020-09-09 DIAGNOSIS — I20.9 ANGINA PECTORIS (HCC): ICD-10-CM

## 2020-09-09 PROCEDURE — 0 TECHNETIUM SESTAMIBI: Performed by: INTERNAL MEDICINE

## 2020-09-09 PROCEDURE — 78452 HT MUSCLE IMAGE SPECT MULT: CPT

## 2020-09-09 PROCEDURE — A9500 TC99M SESTAMIBI: HCPCS | Performed by: INTERNAL MEDICINE

## 2020-09-09 PROCEDURE — 25010000002 SULFUR HEXAFLUORIDE MICROSPH 60.7-25 MG RECONSTITUTED SUSPENSION: Performed by: INTERNAL MEDICINE

## 2020-09-09 PROCEDURE — 93017 CV STRESS TEST TRACING ONLY: CPT

## 2020-09-09 PROCEDURE — 93306 TTE W/DOPPLER COMPLETE: CPT

## 2020-09-09 RX ADMIN — TECHNETIUM TC 99M SESTAMIBI 1 DOSE: 1 INJECTION INTRAVENOUS at 10:40

## 2020-09-09 RX ADMIN — SULFUR HEXAFLUORIDE 4 ML: KIT at 10:30

## 2020-09-09 RX ADMIN — TECHNETIUM TC 99M SESTAMIBI 1 DOSE: 1 INJECTION INTRAVENOUS at 08:20

## 2020-09-10 PROCEDURE — 93018 CV STRESS TEST I&R ONLY: CPT | Performed by: NURSE PRACTITIONER

## 2020-09-19 LAB
BH CV ECHO MEAS - % IVS THICK: 37.8 %
BH CV ECHO MEAS - % LVPW THICK: 76.5 %
BH CV ECHO MEAS - ACS: 1.5 CM
BH CV ECHO MEAS - AO MAX PG (FULL): 0.48 MMHG
BH CV ECHO MEAS - AO MAX PG: 3.5 MMHG
BH CV ECHO MEAS - AO MEAN PG (FULL): 0.4 MMHG
BH CV ECHO MEAS - AO MEAN PG: 2.1 MMHG
BH CV ECHO MEAS - AO ROOT AREA (BSA CORRECTED): 1.5
BH CV ECHO MEAS - AO ROOT AREA: 5.1 CM^2
BH CV ECHO MEAS - AO ROOT DIAM: 2.6 CM
BH CV ECHO MEAS - AO V2 MAX: 93 CM/SEC
BH CV ECHO MEAS - AO V2 MEAN: 69.6 CM/SEC
BH CV ECHO MEAS - AO V2 VTI: 19.5 CM
BH CV ECHO MEAS - AVA(I,A): 3 CM^2
BH CV ECHO MEAS - AVA(I,D): 3 CM^2
BH CV ECHO MEAS - AVA(V,A): 2.9 CM^2
BH CV ECHO MEAS - AVA(V,D): 2.9 CM^2
BH CV ECHO MEAS - BSA(HAYCOCK): 1.6 M^2
BH CV ECHO MEAS - BSA: 1.7 M^2
BH CV ECHO MEAS - BZI_BMI: 19 KILOGRAMS/M^2
BH CV ECHO MEAS - BZI_METRIC_HEIGHT: 172.7 CM
BH CV ECHO MEAS - BZI_METRIC_WEIGHT: 56.7 KG
BH CV ECHO MEAS - EDV(CUBED): 83.2 ML
BH CV ECHO MEAS - EDV(MOD-SP4): 102 ML
BH CV ECHO MEAS - EDV(TEICH): 86.1 ML
BH CV ECHO MEAS - EF(CUBED): 61.7 %
BH CV ECHO MEAS - EF(MOD-BP): 53 %
BH CV ECHO MEAS - EF(MOD-SP4): 57.5 %
BH CV ECHO MEAS - EF(TEICH): 53.4 %
BH CV ECHO MEAS - ESV(CUBED): 31.9 ML
BH CV ECHO MEAS - ESV(MOD-SP4): 43.4 ML
BH CV ECHO MEAS - ESV(TEICH): 40.1 ML
BH CV ECHO MEAS - FS: 27.4 %
BH CV ECHO MEAS - IVS/LVPW: 1.3
BH CV ECHO MEAS - IVSD: 1 CM
BH CV ECHO MEAS - IVSS: 1.4 CM
BH CV ECHO MEAS - LA DIMENSION(2D): 2.8 CM
BH CV ECHO MEAS - LV DIASTOLIC VOL/BSA (35-75): 60.9 ML/M^2
BH CV ECHO MEAS - LV MASS(C)D: 124.9 GRAMS
BH CV ECHO MEAS - LV MASS(C)DI: 74.6 GRAMS/M^2
BH CV ECHO MEAS - LV MASS(C)S: 147.8 GRAMS
BH CV ECHO MEAS - LV MASS(C)SI: 88.3 GRAMS/M^2
BH CV ECHO MEAS - LV MAX PG: 3 MMHG
BH CV ECHO MEAS - LV MEAN PG: 1.7 MMHG
BH CV ECHO MEAS - LV SYSTOLIC VOL/BSA (12-30): 25.9 ML/M^2
BH CV ECHO MEAS - LV V1 MAX: 86.3 CM/SEC
BH CV ECHO MEAS - LV V1 MEAN: 61.8 CM/SEC
BH CV ECHO MEAS - LV V1 VTI: 19 CM
BH CV ECHO MEAS - LVIDD: 4.4 CM
BH CV ECHO MEAS - LVIDS: 3.2 CM
BH CV ECHO MEAS - LVOT AREA: 3.1 CM^2
BH CV ECHO MEAS - LVOT DIAM: 2 CM
BH CV ECHO MEAS - LVPWD: 0.78 CM
BH CV ECHO MEAS - LVPWS: 1.4 CM
BH CV ECHO MEAS - MV A MAX VEL: 71.8 CM/SEC
BH CV ECHO MEAS - MV DEC SLOPE: 125.3 CM/SEC^2
BH CV ECHO MEAS - MV DEC TIME: 0.35 SEC
BH CV ECHO MEAS - MV E MAX VEL: 43.2 CM/SEC
BH CV ECHO MEAS - MV E/A: 0.6
BH CV ECHO MEAS - MV MAX PG: 1.6 MMHG
BH CV ECHO MEAS - MV MEAN PG: 0.55 MMHG
BH CV ECHO MEAS - MV V2 MAX: 63.1 CM/SEC
BH CV ECHO MEAS - MV V2 MEAN: 33.5 CM/SEC
BH CV ECHO MEAS - MV V2 VTI: 19.4 CM
BH CV ECHO MEAS - MVA(VTI): 3 CM^2
BH CV ECHO MEAS - PA ACC TIME: 0.1 SEC
BH CV ECHO MEAS - PA MAX PG (FULL): 0.51 MMHG
BH CV ECHO MEAS - PA MAX PG: 1.6 MMHG
BH CV ECHO MEAS - PA MEAN PG (FULL): 0.3 MMHG
BH CV ECHO MEAS - PA MEAN PG: 0.87 MMHG
BH CV ECHO MEAS - PA PR(ACCEL): 33.8 MMHG
BH CV ECHO MEAS - PA V2 MAX: 62.8 CM/SEC
BH CV ECHO MEAS - PA V2 MEAN: 45.1 CM/SEC
BH CV ECHO MEAS - PA V2 VTI: 13.3 CM
BH CV ECHO MEAS - PULM A REVS DUR: 0.08 SEC
BH CV ECHO MEAS - PULM A REVS VEL: 27.5 CM/SEC
BH CV ECHO MEAS - PULM DIAS VEL: 39.6 CM/SEC
BH CV ECHO MEAS - PULM S/D: 1.3
BH CV ECHO MEAS - PULM SYS VEL: 52.6 CM/SEC
BH CV ECHO MEAS - RV MAX PG: 1.1 MMHG
BH CV ECHO MEAS - RV MEAN PG: 0.57 MMHG
BH CV ECHO MEAS - RV V1 MAX: 51.7 CM/SEC
BH CV ECHO MEAS - RV V1 MEAN: 35 CM/SEC
BH CV ECHO MEAS - RV V1 VTI: 10.5 CM
BH CV ECHO MEAS - RVDD: 2.1 CM
BH CV ECHO MEAS - SI(AO): 59.7 ML/M^2
BH CV ECHO MEAS - SI(CUBED): 30.6 ML/M^2
BH CV ECHO MEAS - SI(LVOT): 35.2 ML/M^2
BH CV ECHO MEAS - SI(MOD-SP4): 35 ML/M^2
BH CV ECHO MEAS - SI(TEICH): 27.5 ML/M^2
BH CV ECHO MEAS - SV(AO): 100 ML
BH CV ECHO MEAS - SV(CUBED): 51.3 ML
BH CV ECHO MEAS - SV(LVOT): 58.9 ML
BH CV ECHO MEAS - SV(MOD-SP4): 58.6 ML
BH CV ECHO MEAS - SV(TEICH): 46 ML
BH CV STRESS BP STAGE 1: NORMAL
BH CV STRESS BP STAGE 2: NORMAL
BH CV STRESS BP STAGE 3: NORMAL
BH CV STRESS DURATION MIN STAGE 1: 3
BH CV STRESS DURATION MIN STAGE 2: 3
BH CV STRESS DURATION MIN STAGE 3: 4
BH CV STRESS DURATION SEC STAGE 1: 0
BH CV STRESS DURATION SEC STAGE 2: 0
BH CV STRESS DURATION SEC STAGE 3: 0
BH CV STRESS GRADE STAGE 1: 10
BH CV STRESS GRADE STAGE 2: 12
BH CV STRESS GRADE STAGE 3: 14
BH CV STRESS HR STAGE 1: 85
BH CV STRESS HR STAGE 2: 102
BH CV STRESS HR STAGE 3: 133
BH CV STRESS METS STAGE 1: 5
BH CV STRESS METS STAGE 2: 7.5
BH CV STRESS METS STAGE 3: 10
BH CV STRESS PROTOCOL 1: NORMAL
BH CV STRESS RECOVERY BP: NORMAL MMHG
BH CV STRESS RECOVERY HR: 86 BPM
BH CV STRESS SPEED STAGE 1: 1.7
BH CV STRESS SPEED STAGE 2: 2.5
BH CV STRESS SPEED STAGE 3: 3.4
BH CV STRESS STAGE 1: 1
BH CV STRESS STAGE 2: 2
BH CV STRESS STAGE 3: 3
LV EF NUC BP: 47 %
MAXIMAL PREDICTED HEART RATE: 161 BPM
MAXIMAL PREDICTED HEART RATE: 161 BPM
PERCENT MAX PREDICTED HR: 82.61 %
STRESS BASELINE BP: NORMAL MMHG
STRESS BASELINE HR: 64 BPM
STRESS PERCENT HR: 97 %
STRESS POST ESTIMATED WORKLOAD: 10.1 METS
STRESS POST EXERCISE DUR MIN: 10 MIN
STRESS POST EXERCISE DUR SEC: 0 SEC
STRESS POST PEAK BP: NORMAL MMHG
STRESS POST PEAK HR: 133 BPM
STRESS TARGET HR: 137 BPM
STRESS TARGET HR: 137 BPM

## 2020-09-19 PROCEDURE — 93306 TTE W/DOPPLER COMPLETE: CPT | Performed by: INTERNAL MEDICINE

## 2020-09-19 PROCEDURE — 78452 HT MUSCLE IMAGE SPECT MULT: CPT | Performed by: INTERNAL MEDICINE

## 2020-09-22 ENCOUNTER — TELEPHONE (OUTPATIENT)
Dept: CARDIOLOGY | Facility: CLINIC | Age: 60
End: 2020-09-22

## 2020-10-01 ENCOUNTER — OFFICE VISIT (OUTPATIENT)
Dept: CARDIOLOGY | Facility: CLINIC | Age: 60
End: 2020-10-01

## 2020-10-01 VITALS
DIASTOLIC BLOOD PRESSURE: 76 MMHG | HEART RATE: 83 BPM | SYSTOLIC BLOOD PRESSURE: 118 MMHG | WEIGHT: 125 LBS | BODY MASS INDEX: 18.94 KG/M2 | HEIGHT: 68 IN

## 2020-10-01 DIAGNOSIS — R00.1 BRADYCARDIA, SINUS: ICD-10-CM

## 2020-10-01 DIAGNOSIS — I25.5 ISCHEMIC CARDIOMYOPATHY: ICD-10-CM

## 2020-10-01 DIAGNOSIS — I25.10 CORONARY ARTERY DISEASE INVOLVING NATIVE CORONARY ARTERY OF NATIVE HEART WITHOUT ANGINA PECTORIS: Primary | ICD-10-CM

## 2020-10-01 DIAGNOSIS — I24.0 ACUTE THROMBUS OF LEFT VENTRICLE (HCC): ICD-10-CM

## 2020-10-01 PROCEDURE — 93000 ELECTROCARDIOGRAM COMPLETE: CPT | Performed by: INTERNAL MEDICINE

## 2020-10-01 PROCEDURE — 99214 OFFICE O/P EST MOD 30 MIN: CPT | Performed by: INTERNAL MEDICINE

## 2020-10-01 RX ORDER — METOPROLOL SUCCINATE 25 MG/1
25 TABLET, EXTENDED RELEASE ORAL DAILY
Qty: 90 TABLET | Refills: 3 | Status: SHIPPED | OUTPATIENT
Start: 2020-10-01 | End: 2021-07-12

## 2020-10-01 NOTE — PROGRESS NOTES
Date of Office Visit: 10/01/2020  Encounter Provider: Dr. Ra Campos  Place of Service: [facility] CARDIOLOGY [place]  Patient Name: Damien Peña  :1960  Ra Campos MD    Chief Complaint   Patient presents with   • Coronary Artery Disease     3 month follow up stress test/echo   • Slow Heart Rate     History of Present Illness    I am pleased to see Mr. Charlton in my office today as a follow-up.    As you know, patient is 60-year-old white gentleman whose past medical history significant for CAD, coronary artery stenting, cardiomyopathy, renal artery thromboembolism, who came today for follow-up.    In , patient was diagnosed with CAD with acute coronary artery syndrome.  Patient underwent coronary artery stenting.  Patient had similar symptoms in  and had repeat cardiac catheterization and angioplasty.  Details of these procedures are not available to me.    In 2020, patient was admitted at [facility] with left flank pain and was found to have left renal artery thrombus causing left kidney infarction.  Patient underwent echocardiogram which showed EF of 45 to 50%.  Apical thrombus was noted in left ventricle.  Patient had wall motion abnormality involving apex and septum.  Patient was started on Eliquis.    In 2020, patient underwent stress test which showed large anterior and apical myocardial infarction but no ischemia was noted.  Echocardiogram showed EF of 50% and hypokinesis of mid to distal septum and apex was noted.  No apical thrombus was noted.    Since the previous visit, patient is doing fairly well from cardiovascular standpoint.  Patient complains of occasional burning sensation.  Patient denies any chest pain or tightness or heaviness.  Patient denies any orthopnea, PND, syncope or presyncope.  No leg edema noted.    EKG showed normal sinus rhythm.  Septal myocardial infarction is noted.  No change from previous EKG.    Patient underwent  stress test and echocardiogram.  There was complete resolution of LV thrombus.  I would continue Eliquis.  I would advise the patient to stop taking aspirin.  Patient is on Plavix.  I would start Toprol-XL 25 mg daily.  Blood pressure monitoring is recommended.  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  I will see the patient in 6 months.      Past Medical History:   Diagnosis Date   • Bradycardia    • CAD (coronary artery disease)    • Chronic kidney disease    • Tobacco abuse          Past Surgical History:   Procedure Laterality Date   • APPENDECTOMY     • CARDIAC CATHETERIZATION     • COLONOSCOPY     • CORONARY STENT PLACEMENT     • ECHO - CONVERTED  2020           Current Outpatient Medications:   •  apixaban (ELIQUIS) 5 MG tablet tablet, Take 1 tablet by mouth Every 12 (Twelve) Hours. Indications: DVT/PE (active thrombosis), Disp: 180 tablet, Rfl: 2  •  aspirin 81 MG tablet, Take 1 tablet by mouth Daily., Disp: 30 tablet, Rfl: 2  •  clopidogrel (Plavix) 75 MG tablet, Take 1 tablet by mouth Daily., Disp: 90 tablet, Rfl: 2  •  lisinopril (PRINIVIL,ZESTRIL) 2.5 MG tablet, Take 1 tablet by mouth Daily., Disp: 90 tablet, Rfl: 3  •  metoprolol succinate XL (TOPROL-XL) 25 MG 24 hr tablet, Take 1 tablet by mouth Daily., Disp: 90 tablet, Rfl: 3      Social History     Socioeconomic History   • Marital status: Single     Spouse name: Not on file   • Number of children: Not on file   • Years of education: Not on file   • Highest education level: Not on file   Tobacco Use   • Smoking status: Current Every Day Smoker     Packs/day: 1.50     Years: 30.00     Pack years: 45.00     Types: Cigarettes   • Smokeless tobacco: Never Used   • Tobacco comment: Patient is not willing to quit smoking   Substance and Sexual Activity   • Alcohol use: Yes     Comment: beer occasionally   • Drug use: Never   • Sexual activity: Defer         Review of Systems   Constitution: Negative for chills and fever.   HENT: Negative for ear  "discharge and nosebleeds.    Eyes: Negative for discharge and redness.   Cardiovascular: Negative for chest pain, orthopnea, palpitations, paroxysmal nocturnal dyspnea and syncope.   Respiratory: Positive for shortness of breath. Negative for cough and wheezing.    Endocrine: Negative for heat intolerance.   Skin: Negative for rash.   Musculoskeletal: Positive for arthritis and joint pain. Negative for myalgias.   Gastrointestinal: Negative for abdominal pain, melena, nausea and vomiting.   Genitourinary: Negative for dysuria and hematuria.   Neurological: Negative for dizziness, light-headedness, numbness and tremors.   Psychiatric/Behavioral: Negative for depression. The patient is not nervous/anxious.        Procedures      ECG 12 Lead    Date/Time: 10/1/2020 4:50 PM  Performed by: Ra Campos MD  Authorized by: Ra Campos MD   Comparison: compared with previous ECG   Similar to previous ECG  Rhythm: sinus rhythm  Q waves: V1, V2 and V3      Clinical impression: abnormal EKG            ECG 12 Lead    (Results Pending)           Objective:    /76   Pulse 83   Ht 172.7 cm (67.99\")   Wt 56.7 kg (125 lb)   BMI 19.01 kg/m²         Constitutional:       Appearance: Well-developed.   Eyes:      General: No scleral icterus.        Right eye: No discharge.   HENT:      Head: Normocephalic and atraumatic.   Neck:      Thyroid: No thyromegaly.      Lymphadenopathy: No cervical adenopathy.   Pulmonary:      Effort: Pulmonary effort is normal. No respiratory distress.      Breath sounds: Normal breath sounds. No wheezing. No rales.   Cardiovascular:      Normal rate. Regular rhythm.      No gallop.   Abdominal:      Tenderness: There is no abdominal tenderness.   Skin:     Findings: No erythema or rash.   Neurological:      Mental Status: Alert and oriented to person, place, and time.             Assessment:       Diagnosis Plan   1. Coronary artery disease involving native coronary artery of native heart " without angina pectoris  ECG 12 Lead    metoprolol succinate XL (TOPROL-XL) 25 MG 24 hr tablet   2. Bradycardia, sinus  ECG 12 Lead    metoprolol succinate XL (TOPROL-XL) 25 MG 24 hr tablet   3. Acute thrombus of left ventricle (CMS/HCC)  metoprolol succinate XL (TOPROL-XL) 25 MG 24 hr tablet   4. Ischemic cardiomyopathy  metoprolol succinate XL (TOPROL-XL) 25 MG 24 hr tablet            Plan:       Patient underwent stress test which showed no ischemia and echocardiogram showed no LV clot.  At present I will continue Eliquis and Plavix.  Aspirin would be discontinued.  Low-dose Toprol would be started.  I will see the patient in 6 months

## 2021-05-02 DIAGNOSIS — I42.8 NON-ISCHEMIC CARDIOMYOPATHY (HCC): ICD-10-CM

## 2021-05-02 DIAGNOSIS — N28.0 RENAL ARTERY THROMBOSIS (HCC): ICD-10-CM

## 2021-05-02 DIAGNOSIS — R00.1 BRADYCARDIA, SINUS: ICD-10-CM

## 2021-05-02 DIAGNOSIS — I25.10 CORONARY ARTERY DISEASE INVOLVING NATIVE CORONARY ARTERY OF NATIVE HEART WITHOUT ANGINA PECTORIS: ICD-10-CM

## 2021-05-02 DIAGNOSIS — I25.118 CORONARY ARTERY DISEASE OF NATIVE ARTERY OF NATIVE HEART WITH STABLE ANGINA PECTORIS (HCC): ICD-10-CM

## 2021-05-03 DIAGNOSIS — R00.1 BRADYCARDIA, SINUS: ICD-10-CM

## 2021-05-03 DIAGNOSIS — I25.10 CORONARY ARTERY DISEASE INVOLVING NATIVE CORONARY ARTERY OF NATIVE HEART WITHOUT ANGINA PECTORIS: ICD-10-CM

## 2021-05-03 DIAGNOSIS — I42.8 NON-ISCHEMIC CARDIOMYOPATHY (HCC): ICD-10-CM

## 2021-05-03 DIAGNOSIS — N28.0 RENAL ARTERY THROMBOSIS (HCC): ICD-10-CM

## 2021-05-03 DIAGNOSIS — I25.118 CORONARY ARTERY DISEASE OF NATIVE ARTERY OF NATIVE HEART WITH STABLE ANGINA PECTORIS (HCC): ICD-10-CM

## 2021-05-04 RX ORDER — LISINOPRIL 2.5 MG/1
TABLET ORAL
Qty: 90 TABLET | Refills: 0 | Status: SHIPPED | OUTPATIENT
Start: 2021-05-04 | End: 2021-07-28 | Stop reason: SDUPTHER

## 2021-05-04 RX ORDER — CLOPIDOGREL BISULFATE 75 MG/1
TABLET ORAL
Qty: 90 TABLET | Refills: 2 | Status: SHIPPED | OUTPATIENT
Start: 2021-05-04 | End: 2021-07-28 | Stop reason: SDUPTHER

## 2021-07-08 ENCOUNTER — APPOINTMENT (OUTPATIENT)
Dept: CT IMAGING | Facility: HOSPITAL | Age: 61
End: 2021-07-08

## 2021-07-08 ENCOUNTER — HOSPITAL ENCOUNTER (INPATIENT)
Facility: HOSPITAL | Age: 61
LOS: 2 days | Discharge: HOME HEALTH | End: 2021-07-11
Attending: EMERGENCY MEDICINE | Admitting: HOSPITALIST

## 2021-07-08 DIAGNOSIS — I25.118 CORONARY ARTERY DISEASE OF NATIVE ARTERY OF NATIVE HEART WITH STABLE ANGINA PECTORIS (HCC): ICD-10-CM

## 2021-07-08 DIAGNOSIS — R11.2 NON-INTRACTABLE VOMITING WITH NAUSEA, UNSPECIFIED VOMITING TYPE: Primary | ICD-10-CM

## 2021-07-08 DIAGNOSIS — I42.8 NON-ISCHEMIC CARDIOMYOPATHY (HCC): ICD-10-CM

## 2021-07-08 DIAGNOSIS — N28.0 RENAL ARTERY THROMBOSIS (HCC): ICD-10-CM

## 2021-07-08 DIAGNOSIS — I25.10 CORONARY ARTERY DISEASE INVOLVING NATIVE CORONARY ARTERY OF NATIVE HEART WITHOUT ANGINA PECTORIS: ICD-10-CM

## 2021-07-08 DIAGNOSIS — R42 VERTIGO: ICD-10-CM

## 2021-07-08 DIAGNOSIS — R00.1 BRADYCARDIA, SINUS: ICD-10-CM

## 2021-07-08 DIAGNOSIS — I63.441 CEREBRAL INFARCTION DUE TO EMBOLISM OF RIGHT CEREBELLAR ARTERY (HCC): ICD-10-CM

## 2021-07-08 PROBLEM — R11.10 NON-INTRACTABLE VOMITING: Status: ACTIVE | Noted: 2021-07-08

## 2021-07-08 LAB
ALBUMIN SERPL-MCNC: 4 G/DL (ref 3.5–5.2)
ALBUMIN/GLOB SERPL: 1.8 G/DL
ALP SERPL-CCNC: 53 U/L (ref 39–117)
ALT SERPL W P-5'-P-CCNC: 12 U/L (ref 1–41)
ANION GAP SERPL CALCULATED.3IONS-SCNC: 7.5 MMOL/L (ref 5–15)
AST SERPL-CCNC: 13 U/L (ref 1–40)
BASOPHILS # BLD AUTO: 0.03 10*3/MM3 (ref 0–0.2)
BASOPHILS NFR BLD AUTO: 0.4 % (ref 0–1.5)
BILIRUB SERPL-MCNC: 0.5 MG/DL (ref 0–1.2)
BUN SERPL-MCNC: 11 MG/DL (ref 6–20)
BUN/CREAT SERPL: 10.4 (ref 7–25)
CALCIUM SPEC-SCNC: 8.4 MG/DL (ref 8.6–10.5)
CHLORIDE SERPL-SCNC: 106 MMOL/L (ref 98–107)
CO2 SERPL-SCNC: 24.5 MMOL/L (ref 22–29)
CREAT SERPL-MCNC: 1.06 MG/DL (ref 0.76–1.27)
DEPRECATED RDW RBC AUTO: 44.2 FL (ref 37–54)
EOSINOPHIL # BLD AUTO: 0.04 10*3/MM3 (ref 0–0.4)
EOSINOPHIL NFR BLD AUTO: 0.5 % (ref 0.3–6.2)
ERYTHROCYTE [DISTWIDTH] IN BLOOD BY AUTOMATED COUNT: 12.3 % (ref 12.3–15.4)
GFR SERPL CREATININE-BSD FRML MDRD: 72 ML/MIN/1.73
GLOBULIN UR ELPH-MCNC: 2.2 GM/DL
GLUCOSE SERPL-MCNC: 148 MG/DL (ref 65–99)
HCT VFR BLD AUTO: 46.7 % (ref 37.5–51)
HGB BLD-MCNC: 16.3 G/DL (ref 13–17.7)
IMM GRANULOCYTES # BLD AUTO: 0.03 10*3/MM3 (ref 0–0.05)
IMM GRANULOCYTES NFR BLD AUTO: 0.4 % (ref 0–0.5)
LYMPHOCYTES # BLD AUTO: 1.82 10*3/MM3 (ref 0.7–3.1)
LYMPHOCYTES NFR BLD AUTO: 21.7 % (ref 19.6–45.3)
MCH RBC QN AUTO: 33.7 PG (ref 26.6–33)
MCHC RBC AUTO-ENTMCNC: 34.9 G/DL (ref 31.5–35.7)
MCV RBC AUTO: 96.5 FL (ref 79–97)
MONOCYTES # BLD AUTO: 0.32 10*3/MM3 (ref 0.1–0.9)
MONOCYTES NFR BLD AUTO: 3.8 % (ref 5–12)
NEUTROPHILS NFR BLD AUTO: 6.14 10*3/MM3 (ref 1.7–7)
NEUTROPHILS NFR BLD AUTO: 73.2 % (ref 42.7–76)
NRBC BLD AUTO-RTO: 0 /100 WBC (ref 0–0.2)
NT-PROBNP SERPL-MCNC: 129.7 PG/ML (ref 0–900)
PLATELET # BLD AUTO: 189 10*3/MM3 (ref 140–450)
PMV BLD AUTO: 10.9 FL (ref 6–12)
POTASSIUM SERPL-SCNC: 3.4 MMOL/L (ref 3.5–5.2)
PROT SERPL-MCNC: 6.2 G/DL (ref 6–8.5)
QT INTERVAL: 457 MS
RBC # BLD AUTO: 4.84 10*6/MM3 (ref 4.14–5.8)
SODIUM SERPL-SCNC: 138 MMOL/L (ref 136–145)
TROPONIN T SERPL-MCNC: <0.01 NG/ML (ref 0–0.03)
WBC # BLD AUTO: 8.38 10*3/MM3 (ref 3.4–10.8)

## 2021-07-08 PROCEDURE — 99222 1ST HOSP IP/OBS MODERATE 55: CPT | Performed by: PSYCHIATRY & NEUROLOGY

## 2021-07-08 PROCEDURE — 70498 CT ANGIOGRAPHY NECK: CPT

## 2021-07-08 PROCEDURE — 99285 EMERGENCY DEPT VISIT HI MDM: CPT

## 2021-07-08 PROCEDURE — G0378 HOSPITAL OBSERVATION PER HR: HCPCS

## 2021-07-08 PROCEDURE — 85025 COMPLETE CBC W/AUTO DIFF WBC: CPT | Performed by: NURSE PRACTITIONER

## 2021-07-08 PROCEDURE — U0004 COV-19 TEST NON-CDC HGH THRU: HCPCS | Performed by: NURSE PRACTITIONER

## 2021-07-08 PROCEDURE — 93005 ELECTROCARDIOGRAM TRACING: CPT | Performed by: NURSE PRACTITIONER

## 2021-07-08 PROCEDURE — 0 IOPAMIDOL PER 1 ML: Performed by: HOSPITALIST

## 2021-07-08 PROCEDURE — 80053 COMPREHEN METABOLIC PANEL: CPT | Performed by: NURSE PRACTITIONER

## 2021-07-08 PROCEDURE — 83880 ASSAY OF NATRIURETIC PEPTIDE: CPT | Performed by: NURSE PRACTITIONER

## 2021-07-08 PROCEDURE — 93010 ELECTROCARDIOGRAM REPORT: CPT | Performed by: INTERNAL MEDICINE

## 2021-07-08 PROCEDURE — 70496 CT ANGIOGRAPHY HEAD: CPT

## 2021-07-08 PROCEDURE — 84484 ASSAY OF TROPONIN QUANT: CPT | Performed by: NURSE PRACTITIONER

## 2021-07-08 PROCEDURE — 25010000002 LORAZEPAM PER 2 MG: Performed by: NURSE PRACTITIONER

## 2021-07-08 PROCEDURE — 99284 EMERGENCY DEPT VISIT MOD MDM: CPT

## 2021-07-08 PROCEDURE — 70450 CT HEAD/BRAIN W/O DYE: CPT

## 2021-07-08 RX ORDER — LORAZEPAM 2 MG/ML
1 INJECTION INTRAMUSCULAR ONCE
Status: COMPLETED | OUTPATIENT
Start: 2021-07-08 | End: 2021-07-08

## 2021-07-08 RX ORDER — SODIUM CHLORIDE 9 MG/ML
100 INJECTION, SOLUTION INTRAVENOUS CONTINUOUS
Status: DISCONTINUED | OUTPATIENT
Start: 2021-07-08 | End: 2021-07-11 | Stop reason: HOSPADM

## 2021-07-08 RX ORDER — SODIUM CHLORIDE 0.9 % (FLUSH) 0.9 %
10 SYRINGE (ML) INJECTION AS NEEDED
Status: DISCONTINUED | OUTPATIENT
Start: 2021-07-08 | End: 2021-07-11 | Stop reason: HOSPADM

## 2021-07-08 RX ORDER — ATORVASTATIN CALCIUM 80 MG/1
80 TABLET, FILM COATED ORAL NIGHTLY
Status: DISCONTINUED | OUTPATIENT
Start: 2021-07-08 | End: 2021-07-10

## 2021-07-08 RX ADMIN — LORAZEPAM 1 MG: 2 INJECTION INTRAMUSCULAR; INTRAVENOUS at 17:18

## 2021-07-08 RX ADMIN — APIXABAN 5 MG: 5 TABLET, FILM COATED ORAL at 22:14

## 2021-07-08 RX ADMIN — SODIUM CHLORIDE 1000 ML: 9 INJECTION, SOLUTION INTRAVENOUS at 17:13

## 2021-07-08 RX ADMIN — IOPAMIDOL 100 ML: 755 INJECTION, SOLUTION INTRAVENOUS at 22:47

## 2021-07-08 RX ADMIN — SODIUM CHLORIDE 100 ML/HR: 9 INJECTION, SOLUTION INTRAVENOUS at 21:10

## 2021-07-08 RX ADMIN — ATORVASTATIN CALCIUM 80 MG: 80 TABLET, FILM COATED ORAL at 22:14

## 2021-07-08 NOTE — ED PROVIDER NOTES
MD ATTESTATION NOTE  Patient was placed in face mask in first look and the following protective measures were taken unless additional measures were taken and documented below in the ED course. Patient was wearing facemask when I entered the room and throughout our encounter. I wore full protective equipment throughout this patient encounter including a face mask, and gloves. Hand hygiene was performed before donning protective equipment and after removal when leaving the room.    The SHARLA and I have discussed this patient's history, physical exam, and treatment plan. I have reviewed the documentation and personally had a face to face interaction with the patient. I affirm the SHARLA documentation and agree with their diagnostics, findings, treatment, plan, and disposition.  The attached note describes my personal findings.    Damien Peña is a 60 y.o. male who presents to the ED c/o vertigo with nausea vomiting.  Patient reports symptoms began 1 hour prior to arrival.  Patient reports multiple episodes of emesis, unable to quantify, emesis nonbloody nonbilious.  Patient reports history of vertigo in the past.    On exam:  General: NAD  Head: NCAT  ENT: Extraocular motion intact, pupils equal and round reactive to light, moist mucous membranes  Neck: Supple, trachea midline.  Cardiac: regular rate and rhythm  Lungs: Clear to auscultation bilaterally  Abdomen: Soft, nontender, no rebound tenderness/guarding/rigidity  : Deferred to SHARLA  Extremities: Moves all extremities well, no peripheral edema  Skin: Warm, dry.    Medical Decision Making:  After the initial H&P, I discussed pertinent information from history and physical exam with patient/family.  Discussed differential diagnosis.  Discussed plan for ED evaluation/work-up/treatment.  All questions answered.  Patient/family is agreeable with plan.    ED Course as of Jul 09 0643   Thu Jul 08, 2021 1704 I will order basic labs, hydrate and give some Ativan to  help with his vertiginous symptoms.    [EW]   1810 Daughter is here.  She is stated to me that the dad has a history of 2 MIs and a kidney infarction.    [EW]   1811 Patient snoring after dose of Ativan and IV fluids.  He is no longer vomiting.  He did open his eyes to mass voice and look to the right and states that he still had some room spinning and then went back to sleep.    [EW]   1840 EKG    viewed by ER MD prior to my interpretation      EKG time: 1819  Rhythm/Rate: 56, sinus rhythm  P waves and KS: Normal KS, normal BHUMI  QRS, axis: Normal QRS, LAFB  ST and T waves: No acute ST or T wave abnormalities  Interpreted Contemporaneously by me, independently viewed  Compared with prior dated 3/20/2020 the patient was paced and had sinus bradycardia with abnormal T waves.  Today's tracing and rate/rhythm are within normal limits.  The LAFB is unchanged.  Patient has no complaints of chest pain/chest pressure or chest tightness.      [EW]   1845 EKG independently viewed and contemporaneously interpreted by ED physician. Time: 1819.  Rate 56.  Normal sinus rhythm, left axis deviation, Q waves in V1 through V4, no ST elevation or depression, T wave inversion in V1 through V5, aVL.    [JG]   1847 When compared with prior EKG on 3/20/2020, no acute changes are present, Q waves and T wave inversions were present at that time as well.    [JG]   1924 Pt still pretty sleepy.  Have tried PO challenge.  Will try to ambulate soon.     [EW]   1941 Labs are unremarkable.  Pt still quite sedated from 1 mg Lorazepam.  Still no return of n/v.  He still feels dizzy.  I will check CT head.     [EW]   2054 Dr. Barrett Radiologist, phoned me.  Pt has abnormality in his cerebellum.  Could be subtle acute infarct in patient with acute onset vertigo.  Given this information, pt vertigo is not getting better I will admit to hospital and consult stroke Neurology.       [EW]   2059 I discussed pt's presentation with Dr. Becerra, Stroke  Neurology.  He would like CTA of head and neck.     [EW]   2102 Nursing has clarified with patient the anticoagulation he is on.  He is not compliant with Eliquis.  He does take  Plavix.     [EW]   2120 Dr. Becerra with stroke neurology here to evaluate patient.     [EW]   2207 Discussed admission with Dr. Shannon [facility].  He agrees to admit the patient to hospital.  He is aware of additional brain imaging that has been ordered.    [EW]      ED Course User Index  [EW] Kerrie Jaquez APRN  [JG] Scott Cardenas MD       Diagnosis  Final diagnoses:   Non-intractable vomiting with nausea, unspecified vomiting type   Vertigo        Scott Cardenas MD  07/09/21 0671

## 2021-07-08 NOTE — ED TRIAGE NOTES
Pt was brought in by ems from home for n/v/d that started approx 45mins-1hr ago.  Onset while eating cereal. Pt was given 4mg zofran. bs 62 and was given 125 D10.     Pt placed in mask on arrival. Staff wearing mask and goggles at time of triage.

## 2021-07-08 NOTE — ED PROVIDER NOTES
"EMERGENCY DEPARTMENT ENCOUNTER    Room Number:  07/07  Date seen:  7/8/2021  Time seen: 16:58 EDT  PCP: Provider, No Known  Historian: Patient, EMS    HPI:  Chief complaint: Acute nausea vomiting, room spinning dizziness  A complete HPI/ROS/PMH/PSH/SH/FH are unobtainable due to: Not applicable context:Damien Peña is a 60 y.o. male who presents to the ED with c/o acute onset nausea and vomiting with \"room spinning dizziness\" which started about 1 hour ago while at home and eating a bowl of cereal.  Prior to arrival he received Zofran and some IV fluids in route.  He is still vomiting and it is not made better or worse by anything.  He is keeping his eyes closed because he says when he opens his eyes the room is spinning.  He has had this problem before.  He denies any abdominal pain, chest pain or shortness of breath.  He denies any urinary symptoms or fever or chills.  Patient states he has a history of an appendectomy and only has 1 functioning kidney.    Patient was placed in face mask in first look. Patient was wearing facemask when I entered the room and throughout our encounter. I wore full protective equipment throughout this patient encounter including a face mask, eye shield and gloves. Hand hygiene/washing of hands was performed before donning protective equipment and after removal when leaving the room.      MEDICAL RECORD REVIEW    ALLERGIES  Patient has no known allergies.    PAST MEDICAL HISTORY  Active Ambulatory Problems     Diagnosis Date Noted   • Kidney infarction (CMS/Formerly [facility]) 03/20/2020   • Coronary artery disease involving native coronary artery of native heart without angina pectoris    • Tobacco abuse    • JOSE MANUEL (acute kidney injury) (CMS/Formerly [facility]) 03/21/2020   • Bradycardia, sinus 03/23/2020     Resolved Ambulatory Problems     Diagnosis Date Noted   • No Resolved Ambulatory Problems     Past Medical History:   Diagnosis Date   • Bradycardia    • CAD (coronary artery disease)    • Chronic kidney " disease        PAST SURGICAL HISTORY  Past Surgical History:   Procedure Laterality Date   • APPENDECTOMY     • CARDIAC CATHETERIZATION     • COLONOSCOPY     • CORONARY STENT PLACEMENT     • ECHO - CONVERTED  2020       FAMILY HISTORY  Family History   Problem Relation Age of Onset   • Heart disease Mother    • Coronary artery disease Father    • Heart disease Father        SOCIAL HISTORY  Social History     Socioeconomic History   • Marital status: Single     Spouse name: Not on file   • Number of children: Not on file   • Years of education: Not on file   • Highest education level: Not on file   Tobacco Use   • Smoking status: Current Every Day Smoker     Packs/day: 1.50     Years: 30.00     Pack years: 45.00     Types: Cigarettes   • Smokeless tobacco: Never Used   • Tobacco comment: Patient is advised to quit smoking   Vaping Use   • Vaping Use: Never used   Substance and Sexual Activity   • Alcohol use: Yes     Comment: beer occasionally   • Drug use: Never   • Sexual activity: Defer       REVIEW OF SYSTEMS  Review of Systems    All systems reviewed and negative except for those discussed in HPI.     PHYSICAL EXAM    ED Triage Vitals [07/08/21 1649]   Temp Heart Rate Resp BP SpO2   97.1 °F (36.2 °C) 62 16 116/72 96 %      Temp src Heart Rate Source Patient Position BP Location FiO2 (%)   Temporal Monitor -- -- --     Physical Exam    I have reviewed the triage vital signs and nursing notes.      GENERAL: actively vomiting  HENT: nares patent, mm moist, no facial droop  EYES: no scleral icterus, PERRL, no nystagmus, the EOM is intact however, when his gaze is to right he appears uncomfortable  NECK: no ROM limitations  CV: regular rhythm, regular rate, no murmur, no rubs, no gallups  RESPIRATORY: normal effort, CTAB  ABDOMEN: soft  : deferred  MUSCULOSKELETAL: no deformity  NEURO: alert, moves all extremities, follows commands, no unilateral weakness.  Cranial nerves 2-12 intact as tested.  Sensation intact.   5/5 strength in all extremities.   No drift in any extremity.  No dysarthria.  No aphasia.  No neglect/extinction.     SKIN: warm, dry    LAB RESULTS  Recent Results (from the past 24 hour(s))   Comprehensive Metabolic Panel    Collection Time: 07/08/21  5:10 PM    Specimen: Blood   Result Value Ref Range    Glucose 148 (H) 65 - 99 mg/dL    BUN 11 6 - 20 mg/dL    Creatinine 1.06 0.76 - 1.27 mg/dL    Sodium 138 136 - 145 mmol/L    Potassium 3.4 (L) 3.5 - 5.2 mmol/L    Chloride 106 98 - 107 mmol/L    CO2 24.5 22.0 - 29.0 mmol/L    Calcium 8.4 (L) 8.6 - 10.5 mg/dL    Total Protein 6.2 6.0 - 8.5 g/dL    Albumin 4.00 3.50 - 5.20 g/dL    ALT (SGPT) 12 1 - 41 U/L    AST (SGOT) 13 1 - 40 U/L    Alkaline Phosphatase 53 39 - 117 U/L    Total Bilirubin 0.5 0.0 - 1.2 mg/dL    eGFR Non African Amer 72 >60 mL/min/1.73    Globulin 2.2 gm/dL    A/G Ratio 1.8 g/dL    BUN/Creatinine Ratio 10.4 7.0 - 25.0    Anion Gap 7.5 5.0 - 15.0 mmol/L   CBC Auto Differential    Collection Time: 07/08/21  5:10 PM    Specimen: Blood   Result Value Ref Range    WBC 8.38 3.40 - 10.80 10*3/mm3    RBC 4.84 4.14 - 5.80 10*6/mm3    Hemoglobin 16.3 13.0 - 17.7 g/dL    Hematocrit 46.7 37.5 - 51.0 %    MCV 96.5 79.0 - 97.0 fL    MCH 33.7 (H) 26.6 - 33.0 pg    MCHC 34.9 31.5 - 35.7 g/dL    RDW 12.3 12.3 - 15.4 %    RDW-SD 44.2 37.0 - 54.0 fl    MPV 10.9 6.0 - 12.0 fL    Platelets 189 140 - 450 10*3/mm3    Neutrophil % 73.2 42.7 - 76.0 %    Lymphocyte % 21.7 19.6 - 45.3 %    Monocyte % 3.8 (L) 5.0 - 12.0 %    Eosinophil % 0.5 0.3 - 6.2 %    Basophil % 0.4 0.0 - 1.5 %    Immature Grans % 0.4 0.0 - 0.5 %    Neutrophils, Absolute 6.14 1.70 - 7.00 10*3/mm3    Lymphocytes, Absolute 1.82 0.70 - 3.10 10*3/mm3    Monocytes, Absolute 0.32 0.10 - 0.90 10*3/mm3    Eosinophils, Absolute 0.04 0.00 - 0.40 10*3/mm3    Basophils, Absolute 0.03 0.00 - 0.20 10*3/mm3    Immature Grans, Absolute 0.03 0.00 - 0.05 10*3/mm3    nRBC 0.0 0.0 - 0.2 /100 WBC   BNP    Collection Time:  07/08/21  5:10 PM    Specimen: Blood   Result Value Ref Range    proBNP 129.7 0.0 - 900.0 pg/mL   Troponin    Collection Time: 07/08/21  5:10 PM    Specimen: Blood   Result Value Ref Range    Troponin T <0.010 0.000 - 0.030 ng/mL   ECG 12 Lead    Collection Time: 07/08/21  6:19 PM   Result Value Ref Range    QT Interval 457 ms         RADIOLOGY RESULTS  CT Head Without Contrast    (Results Pending)   CT Angiogram Head w AI Analysis of LVO    (Results Pending)   CT Angiogram Neck    (Results Pending)   MRI Brain With & Without Contrast    (Results Pending)         PROGRESS, DATA ANALYSIS, CONSULTS AND MEDICAL DECISION MAKING  All labs have been independently reviewed by me.  All radiology studies have been reviewed by me and discussed with radiologist dictating the report.  EKG's independently viewed and interpreted by me unless stated otherwise. Discussion below represents my analysis of pertinent findings related to patient's condition, differential diagnosis, treatment plan and final disposition.     ED Course as of Jul 08 2211   Thu Jul 08, 2021 1704 I will order basic labs, hydrate and give some Ativan to help with his vertiginous symptoms.    [EW]   1810 Daughter is here.  She is stated to me that the dad has a history of 2 MIs and a kidney infarction.    [EW]   1811 Patient snoring after dose of Ativan and IV fluids.  He is no longer vomiting.  He did open his eyes to mass voice and look to the right and states that he still had some room spinning and then went back to sleep.    [EW]   1840 EKG    viewed by GARTH QUINTERO prior to my interpretation      EKG time: 1819  Rhythm/Rate: 56, sinus rhythm  P waves and WA: Normal WA, normal BHUMI  QRS, axis: Normal QRS, LAFB  ST and T waves: No acute ST or T wave abnormalities  Interpreted Contemporaneously by me, independently viewed  Compared with prior dated 3/20/2020 the patient was paced and had sinus bradycardia with abnormal T waves.  Today's tracing and rate/rhythm are  "within normal limits.  The LAFB is unchanged.  Patient has no complaints of chest pain/chest pressure or chest tightness.      [EW]   1845 EKG independently viewed and contemporaneously interpreted by ED physician. Time: 1819.  Rate 56.  Normal sinus rhythm, left axis deviation, Q waves in V1 through V4, no ST elevation or depression, T wave inversion in V1 through V5, aVL.    [JG]   1847 When compared with prior EKG on 3/20/2020, no acute changes are present, Q waves and T wave inversions were present at that time as well.    [JG]   1924 Pt still pretty sleepy.  Have tried PO challenge.  Will try to ambulate soon.     [EW]   1941 Labs are unremarkable.  Pt still quite sedated from 1 mg Lorazepam.  Still no return of n/v.  He still feels dizzy.  I will check CT head.     [EW]   2054 Dr. Barrett Radiologist, phoned me.  Pt has abnormality in his cerebellum.  Could be subtle acute infarct in patient with acute onset vertigo.  Given this information, pt vertigo is not getting better I will admit to hospital and consult stroke Neurology.       [EW]   2059 I discussed pt's presentation with Dr. Becerra, Stroke Neurology.  He would like CTA of head and neck.     [EW]   2102 Nursing has clarified with patient the anticoagulation he is on.  He is not compliant with Eliquis.  He does take  Plavix.     [EW]   2120 Dr. Becerra with stroke neurology here to evaluate patient.     [EW]   2207 Discussed admission with Dr. Shannon, [facility].  He agrees to admit the patient to hospital.  He is aware of additional brain imaging that has been ordered.    [EW]      ED Course User Index  [EW] Kerrie Jaquez, TRUPTI  [JG] Scott Cardenas MD       DDX: vertigo, n/v, stemi    MDM: Patient's presentation seem to be vertiginous and the nausea and vomiting and dizziness that was described as \"room spinning\" seem to improve dramatically after dose of Ativan and some IV fluids.  However, patient became pretty sedated and did seem to feel better and " "subsequently had a CT scan which had a vague abnormality which could have been artifact.  Given the persistent dizziness I will admit the patient to the hospital.  The stroke neurologist was consulted, additional imaging was ordered and patient will be admitted to the hospitalist.  He has no acute neurological deficits.  His neuro exam has remained stable and unchanged while in the emergency department.      The patient is not a TPA candidate due to low NIH.       Reviewed pt's history and workup with Dr. Cardenas.  After a bedside evaluation, Dr. Cardenas agrees with the plan of care.    Based on the patient's lab findings and presenting symptoms, the doctor and I feel it is appropriate to admit the patient for further management, evaluation, and treatment.  I have discussed this with the admitting team.  I have also discussed this with the patient/family.  They are in agreement with admission.          Disposition vitals:  /80   Pulse 68   Temp 97.1 °F (36.2 °C) (Temporal)   Resp 16   Ht 177.8 cm (70\")   Wt 63.5 kg (140 lb)   SpO2 97%   BMI 20.09 kg/m²       DIAGNOSIS  Final diagnoses:   Non-intractable vomiting with nausea, unspecified vomiting type   Vertigo       Admission     Kerrie Jaquez, APRN  07/08/21 2214    "

## 2021-07-09 ENCOUNTER — APPOINTMENT (OUTPATIENT)
Dept: MRI IMAGING | Facility: HOSPITAL | Age: 61
End: 2021-07-09

## 2021-07-09 ENCOUNTER — APPOINTMENT (OUTPATIENT)
Dept: CARDIOLOGY | Facility: HOSPITAL | Age: 61
End: 2021-07-09

## 2021-07-09 PROBLEM — I63.441 CEREBRAL INFARCTION DUE TO EMBOLISM OF RIGHT CEREBELLAR ARTERY (HCC): Status: ACTIVE | Noted: 2021-07-09

## 2021-07-09 PROBLEM — I10 ESSENTIAL HYPERTENSION: Status: ACTIVE | Noted: 2021-07-09

## 2021-07-09 PROBLEM — R42 VERTIGO: Status: ACTIVE | Noted: 2021-07-08

## 2021-07-09 PROBLEM — N18.9 CKD (CHRONIC KIDNEY DISEASE): Status: ACTIVE | Noted: 2020-03-21

## 2021-07-09 LAB
BILIRUB UR QL STRIP: NEGATIVE
CLARITY UR: CLEAR
COLOR UR: YELLOW
GLUCOSE BLDC GLUCOMTR-MCNC: 101 MG/DL (ref 70–130)
GLUCOSE BLDC GLUCOMTR-MCNC: 97 MG/DL (ref 70–130)
GLUCOSE UR STRIP-MCNC: NEGATIVE MG/DL
HGB UR QL STRIP.AUTO: NEGATIVE
KETONES UR QL STRIP: ABNORMAL
LEUKOCYTE ESTERASE UR QL STRIP.AUTO: NEGATIVE
NITRITE UR QL STRIP: NEGATIVE
PH UR STRIP.AUTO: 6.5 [PH] (ref 5–8)
PROT UR QL STRIP: NEGATIVE
SARS-COV-2 ORF1AB RESP QL NAA+PROBE: NOT DETECTED
SP GR UR STRIP: >=1.03 (ref 1–1.03)
UROBILINOGEN UR QL STRIP: ABNORMAL

## 2021-07-09 PROCEDURE — A9577 INJ MULTIHANCE: HCPCS | Performed by: STUDENT IN AN ORGANIZED HEALTH CARE EDUCATION/TRAINING PROGRAM

## 2021-07-09 PROCEDURE — 0 GADOBENATE DIMEGLUMINE 529 MG/ML SOLUTION: Performed by: STUDENT IN AN ORGANIZED HEALTH CARE EDUCATION/TRAINING PROGRAM

## 2021-07-09 PROCEDURE — 82962 GLUCOSE BLOOD TEST: CPT

## 2021-07-09 PROCEDURE — 25010000002 ONDANSETRON PER 1 MG: Performed by: NURSE PRACTITIONER

## 2021-07-09 PROCEDURE — 99233 SBSQ HOSP IP/OBS HIGH 50: CPT | Performed by: NURSE PRACTITIONER

## 2021-07-09 PROCEDURE — 81003 URINALYSIS AUTO W/O SCOPE: CPT | Performed by: NURSE PRACTITIONER

## 2021-07-09 PROCEDURE — 70553 MRI BRAIN STEM W/O & W/DYE: CPT

## 2021-07-09 RX ORDER — METOPROLOL SUCCINATE 25 MG/1
25 TABLET, EXTENDED RELEASE ORAL DAILY
Status: DISCONTINUED | OUTPATIENT
Start: 2021-07-09 | End: 2021-07-09

## 2021-07-09 RX ORDER — CLOPIDOGREL BISULFATE 75 MG/1
75 TABLET ORAL DAILY
Status: DISCONTINUED | OUTPATIENT
Start: 2021-07-09 | End: 2021-07-11 | Stop reason: HOSPADM

## 2021-07-09 RX ORDER — NICOTINE 21 MG/24HR
1 PATCH, TRANSDERMAL 24 HOURS TRANSDERMAL
Status: DISCONTINUED | OUTPATIENT
Start: 2021-07-09 | End: 2021-07-11 | Stop reason: HOSPADM

## 2021-07-09 RX ORDER — ONDANSETRON 2 MG/ML
4 INJECTION INTRAMUSCULAR; INTRAVENOUS EVERY 4 HOURS PRN
Status: DISCONTINUED | OUTPATIENT
Start: 2021-07-09 | End: 2021-07-11 | Stop reason: HOSPADM

## 2021-07-09 RX ADMIN — ATORVASTATIN CALCIUM 80 MG: 80 TABLET, FILM COATED ORAL at 20:15

## 2021-07-09 RX ADMIN — SODIUM CHLORIDE 100 ML/HR: 9 INJECTION, SOLUTION INTRAVENOUS at 09:33

## 2021-07-09 RX ADMIN — SODIUM CHLORIDE 100 ML/HR: 9 INJECTION, SOLUTION INTRAVENOUS at 20:15

## 2021-07-09 RX ADMIN — ONDANSETRON 4 MG: 2 INJECTION INTRAMUSCULAR; INTRAVENOUS at 01:11

## 2021-07-09 RX ADMIN — APIXABAN 5 MG: 5 TABLET, FILM COATED ORAL at 20:15

## 2021-07-09 RX ADMIN — GADOBENATE DIMEGLUMINE 11 ML: 529 INJECTION, SOLUTION INTRAVENOUS at 08:23

## 2021-07-09 RX ADMIN — APIXABAN 5 MG: 5 TABLET, FILM COATED ORAL at 09:31

## 2021-07-09 RX ADMIN — SODIUM CHLORIDE 100 ML/HR: 9 INJECTION, SOLUTION INTRAVENOUS at 10:49

## 2021-07-09 RX ADMIN — CLOPIDOGREL 75 MG: 75 TABLET, FILM COATED ORAL at 16:22

## 2021-07-09 NOTE — ED NOTES
Pt PTO challenged and handled 2 sips of soda. Pt too sleepy to eat crackers.   Provider notified and asked to let pt rest until [id number] and try again.     Family is at bedside and updated.      Amparo Pitt RN  07/08/21 2026

## 2021-07-09 NOTE — ED NOTES
"Nursing report ED to floor  Damien Peña  60 y.o.  male    HPI (triage note):   Chief Complaint   Patient presents with   • Vomiting   • Nausea       Admitting doctor:   Osmin Shannon MD    Admitting diagnosis:   The primary encounter diagnosis was Non-intractable vomiting with nausea, unspecified vomiting type. A diagnosis of Vertigo was also pertinent to this visit.    Code status:   Current Code Status     Date Active Code Status Order ID Comments User Context       Prior    Advance Care Planning Activity          Allergies:   Patient has no known allergies.    Weight:       07/08/21  1711   Weight: 63.5 kg (140 lb)       Most recent vitals:   Vitals:    07/08/21 1649 07/08/21 1711 07/08/21 2030 07/08/21 2100   BP: 116/72  (!) 88/60 112/80   Pulse: 62  55 68   Resp: 16      Temp: 97.1 °F (36.2 °C)      TempSrc: Temporal      SpO2: 96%  95% 97%   Weight:  63.5 kg (140 lb)     Height:  177.8 cm (70\")         Active LDAs/IV Access:   Lines, Drains & Airways    Active LDAs     Name:   Placement date:   Placement time:   Site:   Days:    Peripheral IV 09/09/20 0820 Right Antecubital   09/09/20    0820    Antecubital   302    Peripheral IV 07/08/21 1650 Anterior;Left;Proximal Forearm   07/08/21    1650    Forearm   less than 1                Labs (abnormal labs have a star):   Labs Reviewed   COMPREHENSIVE METABOLIC PANEL - Abnormal; Notable for the following components:       Result Value    Glucose 148 (*)     Potassium 3.4 (*)     Calcium 8.4 (*)     All other components within normal limits    Narrative:     GFR Normal >60  Chronic Kidney Disease <60  Kidney Failure <15     CBC WITH AUTO DIFFERENTIAL - Abnormal; Notable for the following components:    MCH 33.7 (*)     Monocyte % 3.8 (*)     All other components within normal limits   BNP (IN-HOUSE) - Normal    Narrative:     Among patients with dyspnea, NT-proBNP is highly sensitive for the detection of acute congestive heart failure. In addition " NT-proBNP of <300 pg/ml effectively rules out acute congestive heart failure with 99% negative predictive value.    Results may be falsely decreased if patient taking Biotin.     TROPONIN (IN-HOUSE) - Normal    Narrative:     Troponin T Reference Range:  <= 0.03 ng/mL-   Negative for AMI  >0.03 ng/mL-     Abnormal for myocardial necrosis.  Clinicians would have to utilize clinical acumen, EKG, Troponin and serial changes to determine if it is an Acute Myocardial Infarction or myocardial injury due to an underlying chronic condition.       Results may be falsely decreased if patient taking Biotin.     COVID PRE-OP / PRE-PROCEDURE SCREENING ORDER (NO ISOLATION)    Narrative:     The following orders were created for panel order COVID PRE-OP / PRE-PROCEDURE SCREENING ORDER (NO ISOLATION) - Swab, Nasopharynx.  Procedure                               Abnormality         Status                     ---------                               -----------         ------                     COVID-19,APTIMA PANTHER,...[[id number]]                      In process                   Please view results for these tests on the individual orders.   COVID-19,APTIMA PANTHERJENNIEU IN-HOUSE,NP/OP SWAB IN UTM/VTM/SALINE TRANSPORT MEDIA,24 HR TAT   URINALYSIS W/ MICROSCOPIC IF INDICATED (NO CULTURE)   CBC AND DIFFERENTIAL    Narrative:     The following orders were created for panel order CBC & Differential.  Procedure                               Abnormality         Status                     ---------                               -----------         ------                     CBC Auto Differential[[id number]]        Abnormal            Final result                 Please view results for these tests on the individual orders.       EKG:   ECG 12 Lead   Final Result   HEART RATE= 56  bpm   RR Interval= 1076  ms   CA Interval= 153  ms   P Horizontal Axis= 9  deg   P Front Axis= 67  deg   QRSD Interval= 98  ms   QT Interval= 457  ms   QRS Axis=  -70  deg   T Wave Axis= 84  deg   - ABNORMAL ECG -   Sinus rhythm   Left anterior fascicular block   Anterolateral infarct, age indeterminate   NO SIGNIFICANT CHANGE FROM PREVIOUS ECG   Electronically Signed By: Hira Jamil ([facility]) 08-Jul-2021 19:34:41   Date and Time of Study: 2021-07-08 18:19:49          Meds given in ED:   Medications   sodium chloride 0.9 % flush 10 mL (has no administration in time range)   sodium chloride 0.9 % infusion (100 mL/hr Intravenous New Bag 7/8/21 2110)   apixaban (ELIQUIS) tablet 5 mg (5 mg Oral Given 7/8/21 2214)   atorvastatin (LIPITOR) tablet 80 mg (80 mg Oral Given 7/8/21 2214)   sodium chloride 0.9 % bolus 1,000 mL (0 mL Intravenous Stopped 7/8/21 2022)   LORazepam (ATIVAN) injection 1 mg (1 mg Intravenous Given 7/8/21 1718)       Imaging results:  CT Head Without Contrast    Result Date: 7/8/2021  1. Small amount of fluid partially opacifies the inferior left mastoid air cells. 2. There is a subtle area of low-density extending through the superior medial right cerebellum that involves the white matter and extends through the superior medial right cerebellar cortex. It measures 17 x 15 x 15 mm in size. There is a nonspecific finding potentially it could be artifact, potentially it could be a subtle acute superior medial right cerebellar infarct and I recommended an MRI of the brain to further evaluate. The remainder of the head CT is normal.  My findings and recommendation for an MRI of the brain to further evaluate the superior right cerebellum were communicated to TRUPTI Jones by telephone on 07/08/2021 at 8:55 PM  Radiation dose reduction techniques were utilized, including automated exposure control and exposure modulation based on body size.         Ambulatory status:   - Up with assistance.     Social issues:   Social History     Socioeconomic History   • Marital status: Single     Spouse name: Not on file   • Number of children: Not on file   • Years of  education: Not on file   • Highest education level: Not on file   Tobacco Use   • Smoking status: Current Every Day Smoker     Packs/day: 1.50     Years: 30.00     Pack years: 45.00     Types: Cigarettes   • Smokeless tobacco: Never Used   • Tobacco comment: Patient is advised to quit smoking   Vaping Use   • Vaping Use: Never used   Substance and Sexual Activity   • Alcohol use: Yes     Comment: beer occasionally   • Drug use: Never   • Sexual activity: Defer    Nursing report ED to floor     Amparo Pitt RN  07/08/21 6097

## 2021-07-09 NOTE — H&P
Patient Name:  Damien Peña  YOB: 1960  MRN:  [id number]  Admit Date:  7/8/2021  Patient Care Team:  Provider, No Known as PCP - General      Subjective   History Present Illness     Chief Complaint   Patient presents with   • Vomiting   • Nausea     History of Present Illness   Mr. Peña is a 60 y.o. smoker with a history of hypertension, CAD, and CKD that presents to [facility] complaining of vertigo with nausea and vomiting.  Patient refuses to participate or answer questions on exam so HPI is been taken from ER documentation.  He presented to the ED with above complaints.  Symptoms began last night at home while eating a bowl of cereal.  Patient received some Zofran and IV fluids in route to the hospital.  Upon presentation to the emergency department, he describes his dizziness as the room was spinning.  He had multiple episodes of emesis.  Patient received a vertigo cocktail in the emergency department and has been drowsy since administration.  An urgent CT of the brain was performed which showed some abnormalities in the cerebellum in which neurology was consulted.  Patient has been admitted for further work-up.    Review of Systems   Unable to perform ROS: Other   Cardiovascular: Positive for chest pain.   Gastrointestinal: Positive for nausea.        Personal History     Past Medical History:   Diagnosis Date   • Bradycardia    • CAD (coronary artery disease)    • Chronic kidney disease    • Tobacco abuse      Past Surgical History:   Procedure Laterality Date   • APPENDECTOMY     • CARDIAC CATHETERIZATION     • COLONOSCOPY     • CORONARY STENT PLACEMENT     • ECHO - CONVERTED  2020     Family History   Problem Relation Age of Onset   • Heart disease Mother    • Coronary artery disease Father    • Heart disease Father      Social History     Tobacco Use   • Smoking status: Current Every Day Smoker     Packs/day: 1.50     Years: 30.00     Pack years: 45.00      "Types: Cigarettes   • Smokeless tobacco: Never Used   • Tobacco comment: Patient is advised to quit smoking   Vaping Use   • Vaping Use: Never used   Substance Use Topics   • Alcohol use: Yes     Comment: beer occasionally   • Drug use: Never     No current facility-administered medications on file prior to encounter.     Current Outpatient Medications on File Prior to Encounter   Medication Sig Dispense Refill   • apixaban (ELIQUIS) 5 MG tablet tablet Take 1 tablet by mouth Every 12 (Twelve) Hours. Indications: DVT/PE (active thrombosis) 180 tablet 2   • aspirin 81 MG tablet Take 1 tablet by mouth Daily. 30 tablet 2   • clopidogrel (PLAVIX) 75 MG tablet TAKE 1 TABLET BY MOUTH EVERY DAY 90 tablet 2   • lisinopril (PRINIVIL,ZESTRIL) 2.5 MG tablet TAKE 1 TABLET BY MOUTH EVERY DAY 90 tablet 0   • metoprolol succinate XL (TOPROL-XL) 25 MG 24 hr tablet Take 1 tablet by mouth Daily. 90 tablet 3     No Known Allergies    Objective    Objective     Vital Signs  Temp:  [97.1 °F (36.2 °C)-99.3 °F (37.4 °C)] 99.3 °F (37.4 °C)  Heart Rate:  [50-74] 50  Resp:  [16] 16  BP: ()/(59-81) 94/61  SpO2:  [95 %-97 %] 96 %  on   ;   Device (Oxygen Therapy): room air  Body mass index is 18.27 kg/m².    Physical Exam  Vitals and nursing note reviewed.   Constitutional:       General: He is sleeping. He is not in acute distress.     Appearance: He is well-developed. He is not toxic-appearing.      Comments: Appears older than stated age, states \"I just want to sleep\"   HENT:      Head: Normocephalic and atraumatic.   Eyes:      Comments: Unable to assess, patient refuses to open his eyes   Neck:      Vascular: No JVD.   Cardiovascular:      Rate and Rhythm: Regular rhythm. Bradycardia present.      Heart sounds: Normal heart sounds. No murmur heard.   No friction rub. No gallop.    Pulmonary:      Effort: Pulmonary effort is normal. No respiratory distress.      Breath sounds: Decreased breath sounds present. No wheezing or rales. "   Abdominal:      General: Bowel sounds are normal. There is no distension.      Palpations: Abdomen is soft.      Tenderness: There is no abdominal tenderness. There is no guarding.   Musculoskeletal:         General: No tenderness or deformity. Normal range of motion.      Cervical back: Normal range of motion.   Skin:     General: Skin is warm and dry.      Capillary Refill: Capillary refill takes less than 2 seconds.   Neurological:      Mental Status: He is oriented to person, place, and time and easily aroused.   Psychiatric:         Mood and Affect: Affect is flat.         Behavior: Behavior is agitated.       Results Review:  I reviewed the patient's new clinical results.    Lab Results (last 24 hours)     Procedure Component Value Units Date/Time    CBC & Differential [[id number]]  (Abnormal) Collected: 07/08/21 1710    Specimen: Blood Updated: 07/08/21 1730    Narrative:      The following orders were created for panel order CBC & Differential.  Procedure                               Abnormality         Status                     ---------                               -----------         ------                     CBC Auto Differential[[id number]]        Abnormal            Final result                 Please view results for these tests on the individual orders.    Comprehensive Metabolic Panel [[id number]]  (Abnormal) Collected: 07/08/21 1710    Specimen: Blood Updated: 07/08/21 1743     Glucose 148 mg/dL      BUN 11 mg/dL      Creatinine 1.06 mg/dL      Sodium 138 mmol/L      Potassium 3.4 mmol/L      Chloride 106 mmol/L      CO2 24.5 mmol/L      Calcium 8.4 mg/dL      Total Protein 6.2 g/dL      Albumin 4.00 g/dL      ALT (SGPT) 12 U/L      AST (SGOT) 13 U/L      Alkaline Phosphatase 53 U/L      Total Bilirubin 0.5 mg/dL      eGFR Non African Amer 72 mL/min/1.73      Globulin 2.2 gm/dL      A/G Ratio 1.8 g/dL      BUN/Creatinine Ratio 10.4     Anion Gap 7.5 mmol/L     Narrative:      GFR Normal  >60  Chronic Kidney Disease <60  Kidney Failure <15      CBC Auto Differential [[id number]]  (Abnormal) Collected: 07/08/21 1710    Specimen: Blood Updated: 07/08/21 1730     WBC 8.38 10*3/mm3      RBC 4.84 10*6/mm3      Hemoglobin 16.3 g/dL      Hematocrit 46.7 %      MCV 96.5 fL      MCH 33.7 pg      MCHC 34.9 g/dL      RDW 12.3 %      RDW-SD 44.2 fl      MPV 10.9 fL      Platelets 189 10*3/mm3      Neutrophil % 73.2 %      Lymphocyte % 21.7 %      Monocyte % 3.8 %      Eosinophil % 0.5 %      Basophil % 0.4 %      Immature Grans % 0.4 %      Neutrophils, Absolute 6.14 10*3/mm3      Lymphocytes, Absolute 1.82 10*3/mm3      Monocytes, Absolute 0.32 10*3/mm3      Eosinophils, Absolute 0.04 10*3/mm3      Basophils, Absolute 0.03 10*3/mm3      Immature Grans, Absolute 0.03 10*3/mm3      nRBC 0.0 /100 WBC     BNP [[id number]]  (Normal) Collected: 07/08/21 1710    Specimen: Blood Updated: 07/08/21 1830     proBNP 129.7 pg/mL     Narrative:      Among patients with dyspnea, NT-proBNP is highly sensitive for the detection of acute congestive heart failure. In addition NT-proBNP of <300 pg/ml effectively rules out acute congestive heart failure with 99% negative predictive value.    Results may be falsely decreased if patient taking Biotin.      Troponin [[id number]]  (Normal) Collected: 07/08/21 1710    Specimen: Blood Updated: 07/08/21 1830     Troponin T <0.010 ng/mL     Narrative:      Troponin T Reference Range:  <= 0.03 ng/mL-   Negative for AMI  >0.03 ng/mL-     Abnormal for myocardial necrosis.  Clinicians would have to utilize clinical acumen, EKG, Troponin and serial changes to determine if it is an Acute Myocardial Infarction or myocardial injury due to an underlying chronic condition.       Results may be falsely decreased if patient taking Biotin.      COVID PRE-OP / PRE-PROCEDURE SCREENING ORDER (NO ISOLATION) - Swab, Nasopharynx [[id number]] Collected: 07/08/21 2216    Specimen: Swab from Nasopharynx Updated:  07/08/21 2224    Narrative:      The following orders were created for panel order COVID PRE-OP / PRE-PROCEDURE SCREENING ORDER (NO ISOLATION) - Swab, Nasopharynx.  Procedure                               Abnormality         Status                     ---------                               -----------         ------                     COVID-19,APTIMA PANTHER,...[[id number]]                      In process                   Please view results for these tests on the individual orders.    COVID-19,APTIMA PANTHER,[facility] IN-HOUSE, NP/OP SWAB IN UTM/VTM/[facility] TRANSPORT MEDIA,24 HR TAT - Swab, Nasopharynx [[id number]] Collected: 07/08/21 2216    Specimen: Swab from Nasopharynx Updated: 07/08/21 2224          Imaging Results (Last 24 Hours)     Procedure Component Value Units Date/Time    CT Angiogram Head w AI Analysis of LVO [[id number]] Collected: 07/09/21 0028     Updated: 07/09/21 0052    Narrative:      PROCEDURE: CT ANGIOGRAM HEAD W AI ANALYSIS OF LVO-, CT ANGIOGRAM NECK-     HISTORY:  Cerebellar infarcts, nausea, vomiting, dizziness.     TECHNIQUE: CT images of the brain were obtained without intravenous  contrast. Following the administration of intravenous contrast, CT  images of the head and neck were obtained. Reformatted and 3-D images  were reviewed. Radiation dose reduction techniques were utilized,  including automated exposure control and exposure modulation based on  body size. AI analysis of LVO was utilized.     COMPARISON:  CT head without contrast 07/08/2021 at 8:38 PM.     FINDINGS:     Non Contrast CT Brain:     The ventricles and sulci are within normal limits.  There is no  hydrocephalus.  No acute intracranial hemorrhage or pathologic  extra-axial collection is identified.  There is no midline shift or mass  effect.  The basal cisterns are patent. A few scattered foci of  subcortical and periventricular hypoattenuation are nonspecific but  favored to reflect mild small vessel ischemic disease. There  is  redemonstration of a focal area of hypoattenuation involving the cortex  and subcortical superior right cerebellar hemisphere, measuring  approximately 2.3 x 1.6 cm, which is suspicious for possible acute  subacute infarct. There is calcific intracranial atherosclerosis. There  are trace bilateral mastoid effusions.     CT Angiography Head:     The visible internal carotid arteries, middle and anterior cerebral  arteries demonstrate normal contrast-related enhancement.  The anterior  communicating artery complex has a normal appearance. Bilateral  posterior communicating arteries are identified, right larger than left.     The left vertebral artery is dominant. The right V4 segment is diffusely  diminutive, likely developmental. There is a diminutive right P1  segment, which may be developmental. The vertebrobasilar circulation and  both posterior cerebral arteries demonstrate normal contrast-related  enhancement. The superior cerebellar arteries are duplicated  bilaterally. The right superior cerebellar arteries are asymmetrically  diminutive and poorly opacified distally. Bilateral PICA origins are  identified.     No aneurysm or other vascular malformation is identified.       CT Angiography Neck:     There is a 3 vessel aortic arch.  The origins of the vessels arising  from the arch are patent.  There is trace calcific atherosclerosis at  the bifurcation of the brachiocephalic artery.     The common carotid arteries have a normal caliber.  The carotid bulbs  are within normal limits.  The bilateral internal carotid arteries  demonstrate no evidence of flow-limiting stenosis or occlusion. NASCET  criteria was utilized. The distal cervical left internal carotid artery  is slightly tortuous.     The vertebral arteries arise from the subclavian arteries.  The left  vertebral artery is dominant.  There is no evidence of flow-limiting  stenosis or occlusion of the vertebral arteries.     There are calcified  bilateral palatine tonsilloliths. There is  centrilobular and paraseptal emphysema in the lung apices. There is mild  biapical pleuroparenchymal fibrosis, right and left. There are calcified  biapical pleural plaques, left greater than right. There is multilevel  degenerative disc disease. There is multifocal dental disease consisting  of dental caries and periapical lucencies, incompletely assessed.          Impression:         1.  Redemonstration of a small area of hypoattenuation in the superior  right cerebellar hemisphere, suspicious for an acute to subacute  infarct. This would be better assessed with MRI. The right superior  cerebellar artery is asymmetrically diminutive and poorly opacified  distally and may be occluded.  2.  Background mild small vessel ischemic disease.  3.  No acute intracranial hemorrhage.     Discussed with Rosemary, the patient's nurse, at 12:50 AM.     This report was finalized on 7/9/2021 12:49 AM by Dr. Kerrie Etienne M.D.       CT Angiogram Neck [[id number]] Collected: 07/09/21 0028     Updated: 07/09/21 0052    Narrative:      PROCEDURE: CT ANGIOGRAM HEAD W AI ANALYSIS OF LVO-, CT ANGIOGRAM NECK-     HISTORY:  Cerebellar infarcts, nausea, vomiting, dizziness.     TECHNIQUE: CT images of the brain were obtained without intravenous  contrast. Following the administration of intravenous contrast, CT  images of the head and neck were obtained. Reformatted and 3-D images  were reviewed. Radiation dose reduction techniques were utilized,  including automated exposure control and exposure modulation based on  body size. AI analysis of LVO was utilized.     COMPARISON:  CT head without contrast 07/08/2021 at 8:38 PM.     FINDINGS:     Non Contrast CT Brain:     The ventricles and sulci are within normal limits.  There is no  hydrocephalus.  No acute intracranial hemorrhage or pathologic  extra-axial collection is identified.  There is no midline shift or mass  effect.  The basal cisterns are  patent. A few scattered foci of  subcortical and periventricular hypoattenuation are nonspecific but  favored to reflect mild small vessel ischemic disease. There is  redemonstration of a focal area of hypoattenuation involving the cortex  and subcortical superior right cerebellar hemisphere, measuring  approximately 2.3 x 1.6 cm, which is suspicious for possible acute  subacute infarct. There is calcific intracranial atherosclerosis. There  are trace bilateral mastoid effusions.     CT Angiography Head:     The visible internal carotid arteries, middle and anterior cerebral  arteries demonstrate normal contrast-related enhancement.  The anterior  communicating artery complex has a normal appearance. Bilateral  posterior communicating arteries are identified, right larger than left.     The left vertebral artery is dominant. The right V4 segment is diffusely  diminutive, likely developmental. There is a diminutive right P1  segment, which may be developmental. The vertebrobasilar circulation and  both posterior cerebral arteries demonstrate normal contrast-related  enhancement. The superior cerebellar arteries are duplicated  bilaterally. The right superior cerebellar arteries are asymmetrically  diminutive and poorly opacified distally. Bilateral PICA origins are  identified.     No aneurysm or other vascular malformation is identified.       CT Angiography Neck:     There is a 3 vessel aortic arch.  The origins of the vessels arising  from the arch are patent.  There is trace calcific atherosclerosis at  the bifurcation of the brachiocephalic artery.     The common carotid arteries have a normal caliber.  The carotid bulbs  are within normal limits.  The bilateral internal carotid arteries  demonstrate no evidence of flow-limiting stenosis or occlusion. NASCET  criteria was utilized. The distal cervical left internal carotid artery  is slightly tortuous.     The vertebral arteries arise from the subclavian  arteries.  The left  vertebral artery is dominant.  There is no evidence of flow-limiting  stenosis or occlusion of the vertebral arteries.     There are calcified bilateral palatine tonsilloliths. There is  centrilobular and paraseptal emphysema in the lung apices. There is mild  biapical pleuroparenchymal fibrosis, right and left. There are calcified  biapical pleural plaques, left greater than right. There is multilevel  degenerative disc disease. There is multifocal dental disease consisting  of dental caries and periapical lucencies, incompletely assessed.          Impression:         1.  Redemonstration of a small area of hypoattenuation in the superior  right cerebellar hemisphere, suspicious for an acute to subacute  infarct. This would be better assessed with MRI. The right superior  cerebellar artery is asymmetrically diminutive and poorly opacified  distally and may be occluded.  2.  Background mild small vessel ischemic disease.  3.  No acute intracranial hemorrhage.     Discussed with Rosemary, the patient's nurse, at 12:50 AM.     This report was finalized on 7/9/2021 12:49 AM by Dr. Kerrie Etienne M.D.       CT Head Without Contrast [[id number]] Collected: 07/08/21 2232     Updated: 07/08/21 2232    Narrative:      EMERGENCY NONCONTRAST HEAD CT 07/08/2021     CLINICAL HISTORY: Vertigo that is not improving.     TECHNIQUE: Spiral CT images were obtained from the base of the skull to  the vertex without intravenous contrast. Images were re-formatted and  submitted in 3 mm thick axial CT sections with brain algorithm and 2 mm  thick sagittal and coronal reconstructions were performed and submitted  in brain algorithm.     COMPARISON: There are no prior studies from [facility] for comparison.     FINDINGS: On coronal reconstructed images 62 through 65 there is, and  also seen on axial images 18 through 20, is a 1.7 x 1.5 cm area of low  density in the superior medial right cerebellum. This  is nonspecific and  could be artifact, potentially it could be a subtle acute infarct.  Otherwise the brain parenchyma is normal in attenuation. The ventricles  are normal in size. I see no mass effect, no midline shift, no  extra-axial fluid collections are identified and there is no evidence of  acute intracranial hemorrhage. There is partial opacification of the  inferior left mastoid air cells with fluid; otherwise, the paranasal  sinuses, mastoid air cells and middle ear cavities are clear. Calvarium  and skull base are normal in appearance.       Impression:      1. Small amount of fluid partially opacifies the inferior left mastoid  air cells.   2. There is a subtle area of low-density extending through the superior  medial right cerebellum that involves the white matter and extends  through the superior medial right cerebellar cortex. It measures 17 x 15  x 15 mm in size. There is a nonspecific finding potentially it could be  artifact, potentially it could be a subtle acute superior medial right  cerebellar infarct and I recommended an MRI of the brain to further  evaluate. The remainder of the head CT is normal.      My findings and recommendation for an MRI of the brain to further  evaluate the superior right cerebellum were communicated to TRUPTI Jones by telephone on 07/08/2021 at 8:55 PM     Radiation dose reduction techniques were utilized, including automated  exposure control and exposure modulation based on body size.                Results for orders placed during the hospital encounter of 09/09/20    Adult Transthoracic Echo Complete W/ Cont if Necessary Per Protocol    Interpretation Summary  · Left ventricular systolic function is mildly decreased.  · Left ventricular ejection fraction is 50%  · Hypokinesis of mid to distal septum and apex was noted  · Left ventricular diastolic function is consistent with (grade I) impaired relaxation.  · Trace mitral valve regurgitation is  present.      ECG 12 Lead   Final Result   HEART RATE= 56  bpm   RR Interval= 1076  ms   MA Interval= 153  ms   P Horizontal Axis= 9  deg   P Front Axis= 67  deg   QRSD Interval= 98  ms   QT Interval= 457  ms   QRS Axis= -70  deg   T Wave Axis= 84  deg   - ABNORMAL ECG -   Sinus rhythm   Left anterior fascicular block   Anterolateral infarct, age indeterminate   NO SIGNIFICANT CHANGE FROM PREVIOUS ECG   Electronically Signed By: Hira Jamil ([facility]) 08-Jul-2021 19:34:41   Date and Time of Study: 2021-07-08 18:19:49           Assessment/Plan     Active Hospital Problems    Diagnosis  POA   • **Vertigo [R42]  Yes   • Essential hypertension [I10]  Yes   • CKD (chronic kidney disease) [N18.9]  Yes   • Tobacco abuse [Z72.0]  Yes   • Coronary artery disease involving native coronary artery of native heart without angina pectoris [I25.10]  Yes      Resolved Hospital Problems   No resolved problems to display.       Mr. Peña is a 60 y.o. smoker with a history of hypertension, CAD, and CKD who presents with vertigo.    Vertigo with nausea and vomiting  -Patient has been evaluated by neurology.  Plan is to have MRI of the brain with and without contrast in a.m. due to the abnormal CT scan.  -Neurochecks  -Lipid panel and hemoglobin A1c in a.m.  -Patient to start Eliquis 5 mg twice daily and Lipitor 80 mg daily  -PT/OT/ST  -Stroke Education  -Blood pressure control to <130/80  -Appreciate neurology recs    CAD  -Patient denies chest pain    Tobacco abuse  -Encourage cessation    **Of note, patient's home med rec was not complete at the time of this note and assessment.**    I discussed the patient's findings and my recommendations with patient and nursing staff.    VTE Prophylaxis - Eliquis.  Code Status - Full code.       TRUPTI Guerrero  [facility]  07/09/21  00:15 EDT   no

## 2021-07-09 NOTE — PAYOR COMM NOTE
"Pat Boss (60 y.o. Male)                  ATTENTION;       INITIAL CLINICALS FOR REVIEW , AUTH PENDING [id number]               REPLY TO [facility] DEPT, AJIT DUTTA LPN  [phone] OR CALL          Date of Birth Social Security Number Address Home Phone MRN    1960  [id number]    [place] [address] [phone] [id number]    Mandaen Marital Status          None Single       Admission Date Admission Type Admitting Provider Attending Provider Department, Room/Bed    7/8/21 Emergency Osmin Shannon MD Snyder, Perry, MD [address], S520/1    Discharge Date Discharge Disposition Discharge Destination                       Attending Provider: Slick Romero MD    Allergies: No Known Allergies    Isolation: None   Infection: None   Code Status: CPR    Ht: 172.7 cm (68\")   Wt: 57.6 kg (126 lb 14.4 oz)    Admission Cmt: None   Principal Problem: 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 (CMS/[facility]) [I63.441]                 Active Insurance as of 7/8/2021     Primary Coverage     Payor Plan Insurance Group Employer/Plan Group    [employer] BLUE [employer] BLUE [employer] BLUE [employer] PPO [id number]     Payor Plan Address Payor Plan Phone Number Payor Plan Fax Number Effective Dates    [address] [phone]  12/8/2019 - None Entered    [place] [address]       Subscriber Name Subscriber Birth Date Member ID       PAT BOSS 1960 [id number]                 Emergency Contacts      (Rel.) Home Phone Work Phone Mobile Phone    SUSHMA RASHID (Son) [phone] -- [phone]               History & Physical      London Pitt APRN at 07/09/21 0015     Attestation signed by Slick Romero MD at 07/09/21 4553      Addendum: I have reviewed the history and plan as obtained by TRUPTI Valerio and preformed my own independent history and adjusted documentation as needed. I have personally examined " LMOM for a return call.  Due to covid19 we need to reschedule pt physical to end of July beginning of August at this time the patient. 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 He has since been found to have a right cerebellar infarct on MRI.     General: alert, no distress  HEENT: eomi, ncat  Heart: rrr, no m/g/r  Lungs: ctab, nonlabored  Abd: soft, nt/nd  Ext: No peripheral edema    Acute right cerebellar infarct-eliquis/high dose statin restarted per neurology. Echocardiogram pending.     CAD-started on high dose statin. restart plavix, follow up cardiology recommendations. Don't think he'll be able to tolerate BB given bradycardia.  Essential hypertension-permissive hypertension in acute stroke period; bp soft without home acei  CKD3-creatinine better than baseline  Tobacco use  History of left renal artery thrombus  History of left apical thrombus    Slick Romero MD  15:39 EDT                       Patient Name:  Damien Peña  YOB: 1960  MRN:  [id number]  Admit Date:  2021  Patient Care Team:  Provider, No Known as PCP - General      Subjective   History Present Illness     Chief Complaint   Patient presents with   • Vomiting   • Nausea     History of Present Illness   Mr. Peña is a 60 y.o. smoker with a history of hypertension, CAD, and CKD that presents to [facility] complaining of vertigo with nausea and vomiting.  Patient refuses to participate or answer questions on exam so HPI is been taken from ER documentation.  He presented to the ED with above complaints.  Symptoms began last night at home while eating a bowl of cereal.  Patient received some Zofran and IV fluids in route to the hospital.   Upon presentation to the emergency department, he describes his dizziness as the room was spinning.  He had multiple episodes of emesis.  Patient received a vertigo cocktail in the emergency department and has been drowsy since administration.  An urgent CT of the brain was performed which showed some abnormalities in the cerebellum in which neurology was consulted.  Patient has been admitted for further work-up.    Review of Systems   Unable to perform ROS: Other   Cardiovascular: Positive for chest pain.   Gastrointestinal: Positive for nausea.        Personal History     Past Medical History:   Diagnosis Date   • Bradycardia    • CAD (coronary artery disease)    • Chronic kidney disease    • Tobacco abuse      Past Surgical History:   Procedure Laterality Date   • APPENDECTOMY     • CARDIAC CATHETERIZATION     • COLONOSCOPY     • CORONARY STENT PLACEMENT     • ECHO - CONVERTED  2020     Family History   Problem Relation Age of Onset   • Heart disease Mother    • Coronary artery disease Father    • Heart disease Father      Social History     Tobacco Use   • Smoking status: Current Every Day Smoker     Packs/day: 1.50     Years: 30.00     Pack years: 45.00     Types: Cigarettes   • Smokeless tobacco: Never Used   • Tobacco comment: Patient is advised to quit smoking   Vaping Use   • Vaping Use: Never used   Substance Use Topics   • Alcohol use: Yes     Comment: beer occasionally   • Drug use: Never     No current facility-administered medications on file prior to encounter.     Current Outpatient Medications on File Prior to Encounter   Medication Sig Dispense Refill   • apixaban (ELIQUIS) 5 MG tablet tablet Take 1 tablet by mouth Every 12 (Twelve) Hours. Indications: DVT/PE (active thrombosis) 180 tablet 2   • aspirin 81 MG tablet Take 1 tablet by mouth Daily. 30 tablet 2   • clopidogrel (PLAVIX) 75 MG tablet TAKE 1 TABLET BY MOUTH EVERY DAY 90 tablet 2   • lisinopril (PRINIVIL,ZESTRIL) 2.5 MG tablet TAKE 1  "TABLET BY MOUTH EVERY DAY 90 tablet 0   • metoprolol succinate XL (TOPROL-XL) 25 MG 24 hr tablet Take 1 tablet by mouth Daily. 90 tablet 3     No Known Allergies    Objective    Objective     Vital Signs  Temp:  [97.1 °F (36.2 °C)-99.3 °F (37.4 °C)] 99.3 °F (37.4 °C)  Heart Rate:  [50-74] 50  Resp:  [16] 16  BP: ()/(59-81) 94/61  SpO2:  [95 %-97 %] 96 %  on   ;   Device (Oxygen Therapy): room air  Body mass index is 18.27 kg/m².    Physical Exam  Vitals and nursing note reviewed.   Constitutional:       General: He is sleeping. He is not in acute distress.     Appearance: He is well-developed. He is not toxic-appearing.      Comments: Appears older than stated age, states \"I just want to sleep\"   HENT:      Head: Normocephalic and atraumatic.   Eyes:      Comments: Unable to assess, patient refuses to open his eyes   Neck:      Vascular: No JVD.   Cardiovascular:      Rate and Rhythm: Regular rhythm. Bradycardia present.      Heart sounds: Normal heart sounds. No murmur heard.   No friction rub. No gallop.    Pulmonary:      Effort: Pulmonary effort is normal. No respiratory distress.      Breath sounds: Decreased breath sounds present. No wheezing or rales.   Abdominal:      General: Bowel sounds are normal. There is no distension.      Palpations: Abdomen is soft.      Tenderness: There is no abdominal tenderness. There is no guarding.   Musculoskeletal:         General: No tenderness or deformity. Normal range of motion.      Cervical back: Normal range of motion.   Skin:     General: Skin is warm and dry.      Capillary Refill: Capillary refill takes less than 2 seconds.   Neurological:      Mental Status: He is oriented to person, place, and time and easily aroused.   Psychiatric:         Mood and Affect: Affect is flat.         Behavior: Behavior is agitated.       Results Review:  I reviewed the patient's new clinical results.    Lab Results (last 24 hours)     Procedure Component Value Units Date/Time "    CBC & Differential [[id number]]  (Abnormal) Collected: 07/08/21 1710    Specimen: Blood Updated: 07/08/21 1730    Narrative:      The following orders were created for panel order CBC & Differential.  Procedure                               Abnormality         Status                     ---------                               -----------         ------                     CBC Auto Differential[[id number]]        Abnormal            Final result                 Please view results for these tests on the individual orders.    Comprehensive Metabolic Panel [[id number]]  (Abnormal) Collected: 07/08/21 1710    Specimen: Blood Updated: 07/08/21 1743     Glucose 148 mg/dL      BUN 11 mg/dL      Creatinine 1.06 mg/dL      Sodium 138 mmol/L      Potassium 3.4 mmol/L      Chloride 106 mmol/L      CO2 24.5 mmol/L      Calcium 8.4 mg/dL      Total Protein 6.2 g/dL      Albumin 4.00 g/dL      ALT (SGPT) 12 U/L      AST (SGOT) 13 U/L      Alkaline Phosphatase 53 U/L      Total Bilirubin 0.5 mg/dL      eGFR Non African Amer 72 mL/min/1.73      Globulin 2.2 gm/dL      A/G Ratio 1.8 g/dL      BUN/Creatinine Ratio 10.4     Anion Gap 7.5 mmol/L     Narrative:      GFR Normal >60  Chronic Kidney Disease <60  Kidney Failure <15      CBC Auto Differential [[id number]]  (Abnormal) Collected: 07/08/21 1710    Specimen: Blood Updated: 07/08/21 1730     WBC 8.38 10*3/mm3      RBC 4.84 10*6/mm3      Hemoglobin 16.3 g/dL      Hematocrit 46.7 %      MCV 96.5 fL      MCH 33.7 pg      MCHC 34.9 g/dL      RDW 12.3 %      RDW-SD 44.2 fl      MPV 10.9 fL      Platelets 189 10*3/mm3      Neutrophil % 73.2 %      Lymphocyte % 21.7 %      Monocyte % 3.8 %      Eosinophil % 0.5 %      Basophil % 0.4 %      Immature Grans % 0.4 %      Neutrophils, Absolute 6.14 10*3/mm3      Lymphocytes, Absolute 1.82 10*3/mm3      Monocytes, Absolute 0.32 10*3/mm3      Eosinophils, Absolute 0.04 10*3/mm3      Basophils, Absolute 0.03 10*3/mm3      Immature Grans,  Absolute 0.03 10*3/mm3      nRBC 0.0 /100 WBC     BNP [[id number]]  (Normal) Collected: 07/08/21 1710    Specimen: Blood Updated: 07/08/21 1830     proBNP 129.7 pg/mL     Narrative:      Among patients with dyspnea, NT-proBNP is highly sensitive for the detection of acute congestive heart failure. In addition NT-proBNP of <300 pg/ml effectively rules out acute congestive heart failure with 99% negative predictive value.    Results may be falsely decreased if patient taking Biotin.      Troponin [[id number]]  (Normal) Collected: 07/08/21 1710    Specimen: Blood Updated: 07/08/21 1830     Troponin T <0.010 ng/mL     Narrative:      Troponin T Reference Range:  <= 0.03 ng/mL-   Negative for AMI  >0.03 ng/mL-     Abnormal for myocardial necrosis.  Clinicians would have to utilize clinical acumen, EKG, Troponin and serial changes to determine if it is an Acute Myocardial Infarction or myocardial injury due to an underlying chronic condition.       Results may be falsely decreased if patient taking Biotin.      COVID PRE-OP / PRE-PROCEDURE SCREENING ORDER (NO ISOLATION) - Swab, Nasopharynx [[id number]] Collected: 07/08/21 2216    Specimen: Swab from Nasopharynx Updated: 07/08/21 2224    Narrative:      The following orders were created for panel order COVID PRE-OP / PRE-PROCEDURE SCREENING ORDER (NO ISOLATION) - Swab, Nasopharynx.  Procedure                               Abnormality         Status                     ---------                               -----------         ------                     COVID-19,APTIMA PANTHER,...[[id number]]                      In process                   Please view results for these tests on the individual orders.    COVID-19,APTIMA PANTHERMARICRUZ IN-HOUSE, NP/OP SWAB IN UTM/VTM/SALINE TRANSPORT MEDIA,24 HR TAT - Swab, Nasopharynx [[id number]] Collected: 07/08/21 2216    Specimen: Swab from Nasopharynx Updated: 07/08/21 2224          Imaging Results (Last 24 Hours)     Procedure Component  Value Units Date/Time    CT Angiogram Head w AI Analysis of LVO [[id number]] Collected: 07/09/21 0028     Updated: 07/09/21 0052    Narrative:      PROCEDURE: CT ANGIOGRAM HEAD W AI ANALYSIS OF LVO-, CT ANGIOGRAM NECK-     HISTORY:  Cerebellar infarcts, nausea, vomiting, dizziness.     TECHNIQUE: CT images of the brain were obtained without intravenous  contrast. Following the administration of intravenous contrast, CT  images of the head and neck were obtained. Reformatted and 3-D images  were reviewed. Radiation dose reduction techniques were utilized,  including automated exposure control and exposure modulation based on  body size. AI analysis of LVO was utilized.     COMPARISON:  CT head without contrast 07/08/2021 at 8:38 PM.     FINDINGS:     Non Contrast CT Brain:     The ventricles and sulci are within normal limits.  There is no  hydrocephalus.  No acute intracranial hemorrhage or pathologic  extra-axial collection is identified.  There is no midline shift or mass  effect.  The basal cisterns are patent. A few scattered foci of  subcortical and periventricular hypoattenuation are nonspecific but  favored to reflect mild small vessel ischemic disease. There is  redemonstration of a focal area of hypoattenuation involving the cortex  and subcortical superior right cerebellar hemisphere, measuring  approximately 2.3 x 1.6 cm, which is suspicious for possible acute  subacute infarct. There is calcific intracranial atherosclerosis. There  are trace bilateral mastoid effusions.     CT Angiography Head:     The visible internal carotid arteries, middle and anterior cerebral  arteries demonstrate normal contrast-related enhancement.  The anterior  communicating artery complex has a normal appearance. Bilateral  posterior communicating arteries are identified, right larger than left.     The left vertebral artery is dominant. The right V4 segment is diffusely  diminutive, likely developmental. There is a diminutive  right P1  segment, which may be developmental. The vertebrobasilar circulation and  both posterior cerebral arteries demonstrate normal contrast-related  enhancement. The superior cerebellar arteries are duplicated  bilaterally. The right superior cerebellar arteries are asymmetrically  diminutive and poorly opacified distally. Bilateral PICA origins are  identified.     No aneurysm or other vascular malformation is identified.       CT Angiography Neck:     There is a 3 vessel aortic arch.  The origins of the vessels arising  from the arch are patent.  There is trace calcific atherosclerosis at  the bifurcation of the brachiocephalic artery.     The common carotid arteries have a normal caliber.  The carotid bulbs  are within normal limits.  The bilateral internal carotid arteries  demonstrate no evidence of flow-limiting stenosis or occlusion. NASCET  criteria was utilized. The distal cervical left internal carotid artery  is slightly tortuous.     The vertebral arteries arise from the subclavian arteries.  The left  vertebral artery is dominant.  There is no evidence of flow-limiting  stenosis or occlusion of the vertebral arteries.     There are calcified bilateral palatine tonsilloliths. There is  centrilobular and paraseptal emphysema in the lung apices. There is mild  biapical pleuroparenchymal fibrosis, right and left. There are calcified  biapical pleural plaques, left greater than right. There is multilevel  degenerative disc disease. There is multifocal dental disease consisting  of dental caries and periapical lucencies, incompletely assessed.          Impression:         1.  Redemonstration of a small area of hypoattenuation in the superior  right cerebellar hemisphere, suspicious for an acute to subacute  infarct. This would be better assessed with MRI. The right superior  cerebellar artery is asymmetrically diminutive and poorly opacified  distally and may be occluded.  2.  Background mild small  vessel ischemic disease.  3.  No acute intracranial hemorrhage.     Discussed with Rosemary, the patient's nurse, at 12:50 AM.     This report was finalized on 7/9/2021 12:49 AM by Dr. Kerrie Etienne M.D.       CT Angiogram Neck [[id number]] Collected: 07/09/21 0028     Updated: 07/09/21 0052    Narrative:      PROCEDURE: CT ANGIOGRAM HEAD W AI ANALYSIS OF LVO-, CT ANGIOGRAM NECK-     HISTORY:  Cerebellar infarcts, nausea, vomiting, dizziness.     TECHNIQUE: CT images of the brain were obtained without intravenous  contrast. Following the administration of intravenous contrast, CT  images of the head and neck were obtained. Reformatted and 3-D images  were reviewed. Radiation dose reduction techniques were utilized,  including automated exposure control and exposure modulation based on  body size. AI analysis of LVO was utilized.     COMPARISON:  CT head without contrast 07/08/2021 at 8:38 PM.     FINDINGS:     Non Contrast CT Brain:     The ventricles and sulci are within normal limits.  There is no  hydrocephalus.  No acute intracranial hemorrhage or pathologic  extra-axial collection is identified.  There is no midline shift or mass  effect.  The basal cisterns are patent. A few scattered foci of  subcortical and periventricular hypoattenuation are nonspecific but  favored to reflect mild small vessel ischemic disease. There is  redemonstration of a focal area of hypoattenuation involving the cortex  and subcortical superior right cerebellar hemisphere, measuring  approximately 2.3 x 1.6 cm, which is suspicious for possible acute  subacute infarct. There is calcific intracranial atherosclerosis. There  are trace bilateral mastoid effusions.     CT Angiography Head:     The visible internal carotid arteries, middle and anterior cerebral  arteries demonstrate normal contrast-related enhancement.  The anterior  communicating artery complex has a normal appearance. Bilateral  posterior communicating arteries are  identified, right larger than left.     The left vertebral artery is dominant. The right V4 segment is diffusely  diminutive, likely developmental. There is a diminutive right P1  segment, which may be developmental. The vertebrobasilar circulation and  both posterior cerebral arteries demonstrate normal contrast-related  enhancement. The superior cerebellar arteries are duplicated  bilaterally. The right superior cerebellar arteries are asymmetrically  diminutive and poorly opacified distally. Bilateral PICA origins are  identified.     No aneurysm or other vascular malformation is identified.       CT Angiography Neck:     There is a 3 vessel aortic arch.  The origins of the vessels arising  from the arch are patent.  There is trace calcific atherosclerosis at  the bifurcation of the brachiocephalic artery.     The common carotid arteries have a normal caliber.  The carotid bulbs  are within normal limits.  The bilateral internal carotid arteries  demonstrate no evidence of flow-limiting stenosis or occlusion. NASCET  criteria was utilized. The distal cervical left internal carotid artery  is slightly tortuous.     The vertebral arteries arise from the subclavian arteries.  The left  vertebral artery is dominant.  There is no evidence of flow-limiting  stenosis or occlusion of the vertebral arteries.     There are calcified bilateral palatine tonsilloliths. There is  centrilobular and paraseptal emphysema in the lung apices. There is mild  biapical pleuroparenchymal fibrosis, right and left. There are calcified  biapical pleural plaques, left greater than right. There is multilevel  degenerative disc disease. There is multifocal dental disease consisting  of dental caries and periapical lucencies, incompletely assessed.          Impression:         1.  Redemonstration of a small area of hypoattenuation in the superior  right cerebellar hemisphere, suspicious for an acute to subacute  infarct. This would be better  assessed with MRI. The right superior  cerebellar artery is asymmetrically diminutive and poorly opacified  distally and may be occluded.  2.  Background mild small vessel ischemic disease.  3.  No acute intracranial hemorrhage.     Discussed with Rosemary, the patient's nurse, at 12:50 AM.     This report was finalized on 7/9/2021 12:49 AM by Dr. Kerrie Etienne M.D.       CT Head Without Contrast [[id number]] Collected: 07/08/21 2232     Updated: 07/08/21 2232    Narrative:      EMERGENCY NONCONTRAST HEAD CT 07/08/2021     CLINICAL HISTORY: Vertigo that is not improving.     TECHNIQUE: Spiral CT images were obtained from the base of the skull to  the vertex without intravenous contrast. Images were re-formatted and  submitted in 3 mm thick axial CT sections with brain algorithm and 2 mm  thick sagittal and coronal reconstructions were performed and submitted  in brain algorithm.     COMPARISON: There are no prior studies from [facility] for comparison.     FINDINGS: On coronal reconstructed images 62 through 65 there is, and  also seen on axial images 18 through 20, is a 1.7 x 1.5 cm area of low  density in the superior medial right cerebellum. This is nonspecific and  could be artifact, potentially it could be a subtle acute infarct.  Otherwise the brain parenchyma is normal in attenuation. The ventricles  are normal in size. I see no mass effect, no midline shift, no  extra-axial fluid collections are identified and there is no evidence of  acute intracranial hemorrhage. There is partial opacification of the  inferior left mastoid air cells with fluid; otherwise, the paranasal  sinuses, mastoid air cells and middle ear cavities are clear. Calvarium  and skull base are normal in appearance.       Impression:      1. Small amount of fluid partially opacifies the inferior left mastoid  air cells.   2. There is a subtle area of low-density extending through the superior  medial right cerebellum that  involves the white matter and extends  through the superior medial right cerebellar cortex. It measures 17 x 15  x 15 mm in size. There is a nonspecific finding potentially it could be  artifact, potentially it could be a subtle acute superior medial right  cerebellar infarct and I recommended an MRI of the brain to further  evaluate. The remainder of the head CT is normal.      My findings and recommendation for an MRI of the brain to further  evaluate the superior right cerebellum were communicated to TRUPTI Jones by telephone on 07/08/2021 at 8:55 PM     Radiation dose reduction techniques were utilized, including automated  exposure control and exposure modulation based on body size.                Results for orders placed during the hospital encounter of 09/09/20    Adult Transthoracic Echo Complete W/ Cont if Necessary Per Protocol    Interpretation Summary  · Left ventricular systolic function is mildly decreased.  · Left ventricular ejection fraction is 50%  · Hypokinesis of mid to distal septum and apex was noted  · Left ventricular diastolic function is consistent with (grade I) impaired relaxation.  · Trace mitral valve regurgitation is present.      ECG 12 Lead   Final Result   HEART RATE= 56  bpm   RR Interval= 1076  ms   OR Interval= 153  ms   P Horizontal Axis= 9  deg   P Front Axis= 67  deg   QRSD Interval= 98  ms   QT Interval= 457  ms   QRS Axis= -70  deg   T Wave Axis= 84  deg   - ABNORMAL ECG -   Sinus rhythm   Left anterior fascicular block   Anterolateral infarct, age indeterminate   NO SIGNIFICANT CHANGE FROM PREVIOUS ECG   Electronically Signed By: Hira Jamil ([facility]) 08-Jul-2021 19:34:41   Date and Time of Study: 2021-07-08 18:19:49           Assessment/Plan     Active Hospital Problems    Diagnosis  POA   • **Vertigo [R42]  Yes   • Essential hypertension [I10]  Yes   • CKD (chronic kidney disease) [N18.9]  Yes   • Tobacco abuse [Z72.0]  Yes   • Coronary artery disease  involving native coronary artery of native heart without angina pectoris [I25.10]  Yes      Resolved Hospital Problems   No resolved problems to display.       Mr. Peña is a 60 y.o. smoker with a history of hypertension, CAD, and CKD who presents with vertigo.    Vertigo with nausea and vomiting  -Patient has been evaluated by neurology.  Plan is to have MRI of the brain with and without contrast in a.m. due to the abnormal CT scan.  -Neurochecks  -Lipid panel and hemoglobin A1c in a.m.  -Patient to start Eliquis 5 mg twice daily and Lipitor 80 mg daily  -PT/OT/ST  -Stroke Education  -Blood pressure control to <130/80  -Appreciate neurology recs    CAD  -Patient denies chest pain    Tobacco abuse  -Encourage cessation    **Of note, patient's home med rec was not complete at the time of this note and assessment.**    I discussed the patient's findings and my recommendations with patient and nursing staff.    VTE Prophylaxis - Eliquis.  Code Status - Full code.       TRUPTI Guerrero  [facility]  07/09/21  00:15 EDT    Electronically signed by Slick Romero MD at 07/09/21 1558          Emergency Department Notes      Sakshi Balderas RN at 07/08/21 1646        Pt was brought in by ems from home for n/v/d that started approx 45mins-1hr ago.  Onset while eating cereal. Pt was given 4mg zofran. bs 62 and was given 125 D10.     Pt placed in mask on arrival. Staff wearing mask and goggles at time of triage.      Electronically signed by Sakshi Balderas RN at 07/08/21 1650     Kerrie Jaquez APRN at 07/08/21 1658     Attestation signed by Scott Cardenas MD at 07/09/21 0627    I supervised care provided by the midlevel provider.    We have discussed this patient's history, physical exam, and treatment plan.  I have personally had a face to face encounter with the patient. I have reviewed the midlevel provider's note and I agree with the midlevel provider's findings and  "plan of care. See my attending note.                    EMERGENCY DEPARTMENT ENCOUNTER    Room Number:  07/07  Date seen:  7/8/2021  Time seen: 16:58 EDT  PCP: Provider, No Known  Historian: Patient, EMS    HPI:  Chief complaint: Acute nausea vomiting, room spinning dizziness  A complete HPI/ROS/PMH/PSH/SH/FH are unobtainable due to: Not applicable context:Damien Peña is a 60 y.o. male who presents to the ED with c/o acute onset nausea and vomiting with \"room spinning dizziness\" which started about 1 hour ago while at home and eating a bowl of cereal.  Prior to arrival he received Zofran and some IV fluids in route.  He is still vomiting and it is not made better or worse by anything.  He is keeping his eyes closed because he says when he opens his eyes the room is spinning.  He has had this problem before.  He denies any abdominal pain, chest pain or shortness of breath.  He denies any urinary symptoms or fever or chills.  Patient states he has a history of an appendectomy and only has 1 functioning kidney.    Patient was placed in face mask in first look. Patient was wearing facemask when I entered the room and throughout our encounter. I wore full protective equipment throughout this patient encounter including a face mask, eye shield and gloves. Hand hygiene/washing of hands was performed before donning protective equipment and after removal when leaving the room.      MEDICAL RECORD REVIEW    ALLERGIES  Patient has no known allergies.    PAST MEDICAL HISTORY  Active Ambulatory Problems     Diagnosis Date Noted   • Kidney infarction (CMS/[facility]) 03/20/2020   • Coronary artery disease involving native coronary artery of native heart without angina pectoris    • Tobacco abuse    • JOSE MANUEL (acute kidney injury) (CMS/[facility]) 03/21/2020   • Bradycardia, sinus 03/23/2020     Resolved Ambulatory Problems     Diagnosis Date Noted   • No Resolved Ambulatory Problems     Past Medical History:   Diagnosis Date   • Bradycardia "    • CAD (coronary artery disease)    • Chronic kidney disease        PAST SURGICAL HISTORY  Past Surgical History:   Procedure Laterality Date   • APPENDECTOMY     • CARDIAC CATHETERIZATION     • COLONOSCOPY     • CORONARY STENT PLACEMENT     • ECHO - CONVERTED  2020       FAMILY HISTORY  Family History   Problem Relation Age of Onset   • Heart disease Mother    • Coronary artery disease Father    • Heart disease Father        SOCIAL HISTORY  Social History     Socioeconomic History   • Marital status: Single     Spouse name: Not on file   • Number of children: Not on file   • Years of education: Not on file   • Highest education level: Not on file   Tobacco Use   • Smoking status: Current Every Day Smoker     Packs/day: 1.50     Years: 30.00     Pack years: 45.00     Types: Cigarettes   • Smokeless tobacco: Never Used   • Tobacco comment: Patient is advised to quit smoking   Vaping Use   • Vaping Use: Never used   Substance and Sexual Activity   • Alcohol use: Yes     Comment: beer occasionally   • Drug use: Never   • Sexual activity: Defer       REVIEW OF SYSTEMS  Review of Systems    All systems reviewed and negative except for those discussed in HPI.     PHYSICAL EXAM    ED Triage Vitals [07/08/21 1649]   Temp Heart Rate Resp BP SpO2   97.1 °F (36.2 °C) 62 16 116/72 96 %      Temp src Heart Rate Source Patient Position BP Location FiO2 (%)   Temporal Monitor -- -- --     Physical Exam    I have reviewed the triage vital signs and nursing notes.      GENERAL: actively vomiting  HENT: nares patent, mm moist, no facial droop  EYES: no scleral icterus, PERRL, no nystagmus, the EOM is intact however, when his gaze is to right he appears uncomfortable  NECK: no ROM limitations  CV: regular rhythm, regular rate, no murmur, no rubs, no gallups  RESPIRATORY: normal effort, CTAB  ABDOMEN: soft  : deferred  MUSCULOSKELETAL: no deformity  NEURO: alert, moves all extremities, follows commands, no unilateral weakness.   Cranial nerves 2-12 intact as tested.  Sensation intact.  5/5 strength in all extremities.   No drift in any extremity.  No dysarthria.  No aphasia.  No neglect/extinction.     SKIN: warm, dry    LAB RESULTS  Recent Results (from the past 24 hour(s))   Comprehensive Metabolic Panel    Collection Time: 07/08/21  5:10 PM    Specimen: Blood   Result Value Ref Range    Glucose 148 (H) 65 - 99 mg/dL    BUN 11 6 - 20 mg/dL    Creatinine 1.06 0.76 - 1.27 mg/dL    Sodium 138 136 - 145 mmol/L    Potassium 3.4 (L) 3.5 - 5.2 mmol/L    Chloride 106 98 - 107 mmol/L    CO2 24.5 22.0 - 29.0 mmol/L    Calcium 8.4 (L) 8.6 - 10.5 mg/dL    Total Protein 6.2 6.0 - 8.5 g/dL    Albumin 4.00 3.50 - 5.20 g/dL    ALT (SGPT) 12 1 - 41 U/L    AST (SGOT) 13 1 - 40 U/L    Alkaline Phosphatase 53 39 - 117 U/L    Total Bilirubin 0.5 0.0 - 1.2 mg/dL    eGFR Non African Amer 72 >60 mL/min/1.73    Globulin 2.2 gm/dL    A/G Ratio 1.8 g/dL    BUN/Creatinine Ratio 10.4 7.0 - 25.0    Anion Gap 7.5 5.0 - 15.0 mmol/L   CBC Auto Differential    Collection Time: 07/08/21  5:10 PM    Specimen: Blood   Result Value Ref Range    WBC 8.38 3.40 - 10.80 10*3/mm3    RBC 4.84 4.14 - 5.80 10*6/mm3    Hemoglobin 16.3 13.0 - 17.7 g/dL    Hematocrit 46.7 37.5 - 51.0 %    MCV 96.5 79.0 - 97.0 fL    MCH 33.7 (H) 26.6 - 33.0 pg    MCHC 34.9 31.5 - 35.7 g/dL    RDW 12.3 12.3 - 15.4 %    RDW-SD 44.2 37.0 - 54.0 fl    MPV 10.9 6.0 - 12.0 fL    Platelets 189 140 - 450 10*3/mm3    Neutrophil % 73.2 42.7 - 76.0 %    Lymphocyte % 21.7 19.6 - 45.3 %    Monocyte % 3.8 (L) 5.0 - 12.0 %    Eosinophil % 0.5 0.3 - 6.2 %    Basophil % 0.4 0.0 - 1.5 %    Immature Grans % 0.4 0.0 - 0.5 %    Neutrophils, Absolute 6.14 1.70 - 7.00 10*3/mm3    Lymphocytes, Absolute 1.82 0.70 - 3.10 10*3/mm3    Monocytes, Absolute 0.32 0.10 - 0.90 10*3/mm3    Eosinophils, Absolute 0.04 0.00 - 0.40 10*3/mm3    Basophils, Absolute 0.03 0.00 - 0.20 10*3/mm3    Immature Grans, Absolute 0.03 0.00 - 0.05 10*3/mm3     nRBC 0.0 0.0 - 0.2 /100 WBC   BNP    Collection Time: 07/08/21  5:10 PM    Specimen: Blood   Result Value Ref Range    proBNP 129.7 0.0 - 900.0 pg/mL   Troponin    Collection Time: 07/08/21  5:10 PM    Specimen: Blood   Result Value Ref Range    Troponin T <0.010 0.000 - 0.030 ng/mL   ECG 12 Lead    Collection Time: 07/08/21  6:19 PM   Result Value Ref Range    QT Interval 457 ms         RADIOLOGY RESULTS  CT Head Without Contrast    (Results Pending)   CT Angiogram Head w AI Analysis of LVO    (Results Pending)   CT Angiogram Neck    (Results Pending)   MRI Brain With & Without Contrast    (Results Pending)         PROGRESS, DATA ANALYSIS, CONSULTS AND MEDICAL DECISION MAKING  All labs have been independently reviewed by me.  All radiology studies have been reviewed by me and discussed with radiologist dictating the report.  EKG's independently viewed and interpreted by me unless stated otherwise. Discussion below represents my analysis of pertinent findings related to patient's condition, differential diagnosis, treatment plan and final disposition.     ED Course as of Jul 08 2211   Thu Jul 08, 2021 1704 I will order basic labs, hydrate and give some Ativan to help with his vertiginous symptoms.    [EW]   1810 Daughter is here.  She is stated to me that the dad has a history of 2 MIs and a kidney infarction.    [EW]   1811 Patient snoring after dose of Ativan and IV fluids.  He is no longer vomiting.  He did open his eyes to mass voice and look to the right and states that he still had some room spinning and then went back to sleep.    [EW]   1840 EKG    viewed by GARTH QUINTERO prior to my interpretation      EKG time: 1819  Rhythm/Rate: 56, sinus rhythm  P waves and VT: Normal VT, normal BHUMI  QRS, axis: Normal QRS, LAFB  ST and T waves: No acute ST or T wave abnormalities  Interpreted Contemporaneously by me, independently viewed  Compared with prior dated 3/20/2020 the patient was paced and had sinus bradycardia  "with abnormal T waves.  Today's tracing and rate/rhythm are within normal limits.  The LAFB is unchanged.  Patient has no complaints of chest pain/chest pressure or chest tightness.      [EW]   1845 EKG independently viewed and contemporaneously interpreted by ED physician. Time: 1819.  Rate 56.  Normal sinus rhythm, left axis deviation, Q waves in V1 through V4, no ST elevation or depression, T wave inversion in V1 through V5, aVL.    [JG]   1847 When compared with prior EKG on 3/20/2020, no acute changes are present, Q waves and T wave inversions were present at that time as well.    [JG]   1924 Pt still pretty sleepy.  Have tried PO challenge.  Will try to ambulate soon.     [EW]   1941 Labs are unremarkable.  Pt still quite sedated from 1 mg Lorazepam.  Still no return of n/v.  He still feels dizzy.  I will check CT head.     [EW]   2054 Dr. Barrett Radiologist, phoned me.  Pt has abnormality in his cerebellum.  Could be subtle acute infarct in patient with acute onset vertigo.  Given this information, pt vertigo is not getting better I will admit to hospital and consult stroke Neurology.       [EW]   2059 I discussed pt's presentation with Dr. Becerra, Stroke Neurology.  He would like CTA of head and neck.     [EW]   2102 Nursing has clarified with patient the anticoagulation he is on.  He is not compliant with Eliquis.  He does take  Plavix.     [EW]   2120 Dr. Becerra with stroke neurology here to evaluate patient.     [EW]   2207 Discussed admission with Dr. Shannon [facility].  He agrees to admit the patient to hospital.  He is aware of additional brain imaging that has been ordered.    [EW]      ED Course User Index  [EW] Kerrie Jaquez APRN  [JG] Scott Cardenas MD       DDX: vertigo, n/v, stemi    MDM: Patient's presentation seem to be vertiginous and the nausea and vomiting and dizziness that was described as \"room spinning\" seem to improve dramatically after dose of Ativan and some IV fluids.  However, " "patient became pretty sedated and did seem to feel better and subsequently had a CT scan which had a vague abnormality which could have been artifact.  Given the persistent dizziness I will admit the patient to the hospital.  The stroke neurologist was consulted, additional imaging was ordered and patient will be admitted to the hospitalist.  He has no acute neurological deficits.  His neuro exam has remained stable and unchanged while in the emergency department.      The patient is not a TPA candidate due to low NIH.       Reviewed pt's history and workup with Dr. Cardenas.  After a bedside evaluation, Dr. Cardenas agrees with the plan of care.    Based on the patient's lab findings and presenting symptoms, the doctor and I feel it is appropriate to admit the patient for further management, evaluation, and treatment.  I have discussed this with the admitting team.  I have also discussed this with the patient/family.  They are in agreement with admission.          Disposition vitals:  /80   Pulse 68   Temp 97.1 °F (36.2 °C) (Temporal)   Resp 16   Ht 177.8 cm (70\")   Wt 63.5 kg (140 lb)   SpO2 97%   BMI 20.09 kg/m²       DIAGNOSIS  Final diagnoses:   Non-intractable vomiting with nausea, unspecified vomiting type   Vertigo       Admission     Kerrie JaquezTRUPTI  07/08/21 9964      Electronically signed by Scott Cardenas MD at 07/09/21 8097     Scott Cardenas MD at 07/08/21 9435          MD ATTESTATION NOTE  Patient was placed in face mask in first look and the following protective measures were taken unless additional measures were taken and documented below in the ED course. Patient was wearing facemask when I entered the room and throughout our encounter. I wore full protective equipment throughout this patient encounter including a face mask, and gloves. Hand hygiene was performed before donning protective equipment and after removal when leaving the room.    The SHARLA and I have discussed " this patient's history, physical exam, and treatment plan. I have reviewed the documentation and personally had a face to face interaction with the patient. I affirm the SHARLA documentation and agree with their diagnostics, findings, treatment, plan, and disposition.  The attached note describes my personal findings.    Damien Peña is a 60 y.o. male who presents to the ED c/o vertigo with nausea vomiting.  Patient reports symptoms began 1 hour prior to arrival.  Patient reports multiple episodes of emesis, unable to quantify, emesis nonbloody nonbilious.  Patient reports history of vertigo in the past.    On exam:  General: NAD  Head: NCAT  ENT: Extraocular motion intact, pupils equal and round reactive to light, moist mucous membranes  Neck: Supple, trachea midline.  Cardiac: regular rate and rhythm  Lungs: Clear to auscultation bilaterally  Abdomen: Soft, nontender, no rebound tenderness/guarding/rigidity  : Deferred to SHARLA  Extremities: Moves all extremities well, no peripheral edema  Skin: Warm, dry.    Medical Decision Making:  After the initial H&P, I discussed pertinent information from history and physical exam with patient/family.  Discussed differential diagnosis.  Discussed plan for ED evaluation/work-up/treatment.  All questions answered.  Patient/family is agreeable with plan.    ED Course as of Jul 09 0643   Thu Jul 08, 2021 1704 I will order basic labs, hydrate and give some Ativan to help with his vertiginous symptoms.    [EW]   1810 Daughter is here.  She is stated to me that the dad has a history of 2 MIs and a kidney infarction.    [EW]   1811 Patient snoring after dose of Ativan and IV fluids.  He is no longer vomiting.  He did open his eyes to mass voice and look to the right and states that he still had some room spinning and then went back to sleep.    [EW]   1840 EKG    viewed by GARTH QUINTERO prior to my interpretation      EKG time: 1819  Rhythm/Rate: 56, sinus rhythm  P waves and ND:  Normal IL, normal BHUMI  QRS, axis: Normal QRS, LAFB  ST and T waves: No acute ST or T wave abnormalities  Interpreted Contemporaneously by me, independently viewed  Compared with prior dated 3/20/2020 the patient was paced and had sinus bradycardia with abnormal T waves.  Today's tracing and rate/rhythm are within normal limits.  The LAFB is unchanged.  Patient has no complaints of chest pain/chest pressure or chest tightness.      [EW]   1845 EKG independently viewed and contemporaneously interpreted by ED physician. Time: 1819.  Rate 56.  Normal sinus rhythm, left axis deviation, Q waves in V1 through V4, no ST elevation or depression, T wave inversion in V1 through V5, aVL.    [JG]   1847 When compared with prior EKG on 3/20/2020, no acute changes are present, Q waves and T wave inversions were present at that time as well.    [JG]   1924 Pt still pretty sleepy.  Have tried PO challenge.  Will try to ambulate soon.     [EW]   1941 Labs are unremarkable.  Pt still quite sedated from 1 mg Lorazepam.  Still no return of n/v.  He still feels dizzy.  I will check CT head.     [EW]   2054 Dr. Barrett Radiologist, phoned me.  Pt has abnormality in his cerebellum.  Could be subtle acute infarct in patient with acute onset vertigo.  Given this information, pt vertigo is not getting better I will admit to hospital and consult stroke Neurology.       [EW]   2059 I discussed pt's presentation with Dr. Becerra, Stroke Neurology.  He would like CTA of head and neck.     [EW]   2102 Nursing has clarified with patient the anticoagulation he is on.  He is not compliant with Eliquis.  He does take  Plavix.     [EW]   2120 Dr. Becerra with stroke neurology here to evaluate patient.     [EW]   2207 Discussed admission with Dr. Shannon, [facility].  He agrees to admit the patient to hospital.  He is aware of additional brain imaging that has been ordered.    [EW]      ED Course User Index  [EW] Kerrie Jaquez APRN  [JG] Scott Cardenas,  MD       Diagnosis  Final diagnoses:   Non-intractable vomiting with nausea, unspecified vomiting type   Vertigo        Scott Cardenas MD  07/09/21 0643      Electronically signed by Scott Cardenas MD at 07/09/21 0643     Amparo Pitt, RN at 07/08/21 1930        Pt PTO challenged and handled 2 sips of soda. Pt too sleepy to eat crackers.   Provider notified and asked to let pt rest until [id number] and try again.     Family is at bedside and updated.      Amparo Pitt, TANNER  07/08/21 2026      Electronically signed by Amparo Pitt, RN at 07/08/21 2026            Vital Signs (last day)     Date/Time   Temp   Temp src   Pulse   Resp   BP   Patient Position   SpO2    07/09/21 1256   97.4 (36.3)   Oral   50   16   106/71   Lying   95    07/09/21 0907   98 (36.7)   Oral   55   16   114/75   Lying   98    07/09/21 0500   --   --   65   --   --   --   --    07/09/21 0422   97.8 (36.6)   Oral   50   16   101/64   Lying   95    07/09/21 0130   --   --   50   --   94/61   Lying   96    07/09/21 0125   --   --   --   --   97/59   --   --    07/09/21 0015   99.3 (37.4)   Oral   74   16   117/81   Lying   97    07/08/21 2100   --   --   68   --   112/80   --   97    07/08/21 2030   --   --   55   --   (!) 88/60   --   95    07/08/21 1649   97.1 (36.2)   Temporal   62   16   116/72   --   96              Oxygen Therapy (last day)     Date/Time   SpO2   Device (Oxygen Therapy)   Flow (L/min)   Oxygen Concentration (%)   ETCO2 (mmHg)    07/09/21 1256   95   room air   --   --   --    07/09/21 0907   98   room air   --   --   --    07/09/21 0836   --   room air   --   --   --    07/09/21 0422   95   room air   --   --   --    07/09/21 0130   96   room air   --   --   --    07/09/21 0037   --   room air   --   --   --    07/09/21 0015   97   room air   --   --   --    07/08/21 2100   97   --   --   --   --    07/08/21 2030 95   --   --   --   --    07/08/21 1649   96   room air   --   --   --                Current  Facility-Administered Medications   Medication Dose Route Frequency Provider Last Rate Last Admin   • apixaban (ELIQUIS) tablet 5 mg  5 mg Oral Q12H Keyur Becerra MD   5 mg at 07/09/21 0931   • atorvastatin (LIPITOR) tablet 80 mg  80 mg Oral Nightly Keyur Becerra MD   80 mg at 07/08/21 2214   • clopidogrel (PLAVIX) tablet 75 mg  75 mg Oral Daily Slick Romero MD       • nicotine (NICODERM CQ) 21 MG/24HR patch 1 patch  1 patch Transdermal Q24H Keyur Becerra MD       • ondansetron (ZOFRAN) injection 4 mg  4 mg Intravenous Q4H PRN London Pitt APRN   4 mg at 07/09/21 0111   • sodium chloride 0.9 % flush 10 mL  10 mL Intravenous PRN Kerrie Jaquez APRN       • sodium chloride 0.9 % infusion  100 mL/hr Intravenous Continuous Kerrie Jaquez APRN 100 mL/hr at 07/09/21 1049 100 mL/hr at 07/09/21 1049     Orders (last 24 hrs)      Start     Ordered    07/10/21 0600  Hemoglobin A1c  Morning Draw      07/09/21 0926    07/10/21 0600  Vitamin B12  Morning Draw      07/09/21 0926    07/10/21 0600  Folate  Morning Draw      07/09/21 0926    07/10/21 0600  TSH  Morning Draw      07/09/21 0926    07/10/21 0600  Lipid Panel  Morning Draw      07/09/21 0927    07/10/21 0600  CBC (No Diff)  Morning Draw      07/09/21 1545    07/10/21 0600  Basic Metabolic Panel  Morning Draw      07/09/21 1545    07/10/21 0500  CT Head Without Contrast  1 Time Imaging      07/09/21 1413    07/09/21 1630  clopidogrel (PLAVIX) tablet 75 mg  Daily      07/09/21 1543    07/09/21 1630  metoprolol succinate XL (TOPROL-XL) 24 hr tablet 25 mg  Daily,   Status:  Discontinued      07/09/21 1543    07/09/21 1608  Inpatient Admission  Once      07/09/21 1609    07/09/21 1449  Advance Diet As Tolerated  Until Discontinued      07/09/21 1448    07/09/21 1419  Inpatient Cardiology Consult  Once     Specialty:  Cardiology  Provider:  Jose Rivas MD    07/09/21 1419    07/09/21 1300  POC Glucose Once  Once      07/09/21 1254     07/09/21 1117  Bed Rest  Until Discontinued      07/09/21 1116    07/09/21 1116  Transfer Patient  Once      07/09/21 1116    07/09/21 1115  Adult Transthoracic Echo Complete W/ Cont if Necessary Per Protocol  Once,   Status:  Canceled      07/09/21 1115    07/09/21 1105  Transfer Patient  Once      07/09/21 1105    07/09/21 1015  nicotine (NICODERM CQ) 21 MG/24HR patch 1 patch  Every 24 Hours Scheduled      07/09/21 0916    07/09/21 0927  Notify Stroke Coordinator  Once     Provider:  (Not yet assigned)    07/09/21 0927    07/09/21 0916  OT Consult: Eval & Treat  Once      07/09/21 0915    07/09/21 0916  NIHSS Assessment  Every Shift     Comments: Turn off all sedation medications prior to performing assessment. Assessment to be performed upon admission, transfer to another unit, discharge, and with neurological decline. If NIHSS change is greater than or equal to 4 and/or neurological decline is noted notify physician.    07/09/21 0915    07/09/21 0915  Adult Transthoracic Echo Complete W/ Cont if Necessary Per Protocol  Once      07/09/21 0915    07/09/21 0915  PT Consult: Eval & Treat Other, Post Stroke; Epley maneuver  Once      07/09/21 0915    07/09/21 0830  gadobenate dimeglumine (MULTIHANCE) injection 11 mL  Once in Imaging      07/09/21 0731    07/09/21 0232  Please call when home med rec is complete  Nursing Communication  Once     Comments: Please call when home med rec is complete    07/09/21 0231    07/09/21 0106  Diet Clear Liquid  Diet Effective Now      07/09/21 0105    07/09/21 0105  ondansetron (ZOFRAN) injection 4 mg  Every 4 Hours PRN      07/09/21 0105    07/09/21 0056  Diet Regular; Cardiac  Diet Effective Now,   Status:  Canceled      07/09/21 0055    07/09/21 0055  Code Status and Medical Interventions:  Continuous      07/09/21 0054    07/09/21 0000  MRI Brain With & Without Contrast  1 Time Imaging      07/08/21 2102    07/09/21 0000  PT Consult: Eval & Treat Other; Epley maneuver  Once,    Status:  Canceled      07/08/21 2140 07/08/21 2249  iopamidol (ISOVUE-370) 76 % injection 100 mL  Once in Imaging      07/08/21 2247 07/08/21 2210  Initiate Observation Status  Once      07/08/21 2209 07/08/21 2210  COVID PRE-OP / PRE-PROCEDURE SCREENING ORDER (NO ISOLATION) - Swab, Nasopharynx  Once      07/08/21 2210 07/08/21 2210  COVID-19,APTIMA PANTHER,[facility]ICRUZ IN-HOUSE, NP/OP SWAB IN UTM/VTM/SALINE TRANSPORT MEDIA,24 HR TAT - Swab, Nasopharynx  PROCEDURE ONCE      07/08/21 2210 07/08/21 2141  apixaban (ELIQUIS) tablet 5 mg  Every 12 Hours Scheduled      07/08/21 2139 07/08/21 2141  atorvastatin (LIPITOR) tablet 80 mg  Nightly      07/08/21 2139 07/08/21 2133  [facility] (on-call MD unless specified) Details  Once     Specialty:  Hospitalist  Provider:  Osmin Shannon MD    07/08/21 2132 07/08/21 2111  sodium chloride 0.9 % infusion  Continuous      07/08/21 2109 07/08/21 2103  CT Angiogram Neck  1 Time Imaging      07/08/21 2102 07/08/21 2102  CT Angiogram Head w AI Analysis of LVO  1 Time Imaging      07/08/21 2102 07/08/21 1941  CT Head Without Contrast  1 Time Imaging      07/08/21 1941    07/08/21 1812  ECG 12 Lead  Once      07/08/21 1811    07/08/21 1807  BNP  Once      07/08/21 1806    07/08/21 1807  Troponin  Once      07/08/21 1806    07/08/21 1707  sodium chloride 0.9 % bolus 1,000 mL  Once      07/08/21 1705    07/08/21 1707  LORazepam (ATIVAN) injection 1 mg  Once      07/08/21 1705    07/08/21 1705  CBC & Differential  Once      07/08/21 1705    07/08/21 1705  Comprehensive Metabolic Panel  Once      07/08/21 1705    07/08/21 1705  Urinalysis With Microscopic If Indicated (No Culture) - Urine, Clean Catch  Once      07/08/21 1705    07/08/21 1705  Insert peripheral IV  Once      07/08/21 1705    07/08/21 1705  Cardiac Monitoring  Once      07/08/21 1705    07/08/21 1705  Pulse Oximetry, Continuous  Continuous      07/08/21 1705    07/08/21 1705  CBC Auto Differential   "PROCEDURE ONCE      21 1705    21 170  sodium chloride 0.9 % flush 10 mL  As Needed      21 1705                   Physician Progress Notes       Cathi Zapien APRN at 21 1100          DOS: 2021  NAME: Damien Peña   : 1960  PCP: Provider, No Known  Chief Complaint   Patient presents with   • Vomiting   • Nausea   Patient seen in follow-up today; new to me        Stroke    Subjective: No events overnight.  Patient reports some vertigo/vision changes with rapid turning of head left to right and gait instability; no unilateral weakness noted/reported.      Daughter at bedside; updated regarding diagnosis/prognosis/outpatient treatment recommendation.  Discussed at length smoking cessation and need for medication compliance in the future    Objective:  Vital signs: /71 (BP Location: Left arm, Patient Position: Lying)   Pulse 50   Temp 97.4 °F (36.3 °C) (Oral)   Resp 16   Ht 172.7 cm (68\")   Wt 57.6 kg (126 lb 14.4 oz)   SpO2 95%   BMI 19.30 kg/m²       HEENT: Normocephalic, atraumatic   COR: RRR  Resp: Even and unlabored  Extremities: Equal pulses, nondistal embolization    Neurological:   MS: AO. Language normal. No neglect. Higher integrative function normal  CN: II-XII normal except intermittent \"visual changes\"-not detected on exam  Motor: 5/5, normal tone  Reflexes: Toes downgoing  Sensory: Intact-to light touch  Coordination: Normal-finger-to-nose    Laboratory results:  Lab Results   Component Value Date    GLUCOSE 148 (H) 2021    CALCIUM 8.4 (L) 2021     2021    K 3.4 (L) 2021    CO2 24.5 2021     2021    BUN 11 2021    CREATININE 1.06 2021    EGFRIFNONA 72 2021    BCR 10.4 2021    ANIONGAP 7.5 2021     Lab Results   Component Value Date    WBC 8.38 2021    HGB 16.3 2021    HCT 46.7 2021    MCV 96.5 2021     2021     No results found for: " CHOL  No results found for: HDL  No results found for: LDL  No results found for: TRIG   No results found for: HGBA1C                   Review and interpretation of imaging:  MRI OF THE BRAIN WITH AND WITHOUT CONTRAST 07/09/2021     CLINICAL HISTORY: Patient complains of dizziness and nausea.     TECHNIQUE: Axial T1, FLAIR, fat-suppressed T2, axial diffusion and  gradient echo T2, sagittal T1 and postcontrast axial fat-suppressed T1  and coronal T1-weighted images were obtained of the entire head. In  addition, thin cut axial T2 and thin cut pre and postcontrast axial and  coronal T1-weighted images were obtained through the internal auditory  canals.     COMPARISON: This is correlated to yesterday's noncontrast head CT  07/08/2021.     FINDINGS: There is a focal area of intense increased signal intensity on  the diffusion-weighted images and high signal on the FLAIR and  T2-weighted images, low signal on the T1-weighted images, and marked  hypointensity on ADC maps indicating true restricted diffusion and  cytotoxic edema extending through the superior medial right cerebellum  that measures approximately 2.2 x 2.4 x 2.3 cm in anterior posterior,  medial lateral and craniocaudal dimension, is consistent with an acute  infarct in the distribution of the superior cerebellar branches of the  right superior cerebellar artery territory. The remainder of the brain  parenchyma is normal in signal intensity. The ventricles are normal in  size. I see no focal mass effect and no midline shift and no extra-axial  fluid collections are identified. No abnormal areas of enhancement are  seen in the head. The paranasal sinuses are clear. There is a tiny  amount of fluid in the inferior tip of the right mastoid air cells,  partial opacification inferior left mastoid air cells with fluid. There  is some T2 hyperintensity within the post PICA segment of the small size  right vertebral artery and the post PICA segment is tiny in  diameter. On  yesterday's CT angiogram of the head and neck that appeared to be  patent, and it probably has slow flow due to its small size. Otherwise,  good flow voids are demonstrated within the cerebral vessels and in the  dural venous sinuses.     Thin cut images through the internal auditory canals demonstrate no  abnormal enhancement within the cerebellopontine angles or within the  internal auditory canals or within the substance of the temporal bones.     IMPRESSION:  1. Small amount of fluid in the inferior mastoid air cells bilaterally.  2. There is a 2.2 x 2.4 x 2.3 cm acute infarct in the superior medial  right cerebellum within the right superior cerebellar artery territory.  There is some T2 high signal within the post PICA segment of the small  size right vertebral artery, and the post PICA segment appeared patent  on yesterday's CT angiogram. It likely has slow or turbulent flow due to  its small size. The remainder of the MRI of the brain is normal. Thin  cut images through the internal auditory canals are also normal. The  results were communicated to Keyur Becerra MD, the stroke  neurologist taking care of the patient by telephone on 07/09/2021 at  9:00 AM.     This report was finalized on 7/9/2021 11:56 AM by Dr. Curry Barrett M.D.     PROCEDURE: CT ANGIOGRAM HEAD W AI ANALYSIS OF LVO-, CT ANGIOGRAM NECK-     HISTORY:  Cerebellar infarcts, nausea, vomiting, dizziness.     TECHNIQUE: CT images of the brain were obtained without intravenous  contrast. Following the administration of intravenous contrast, CT  images of the head and neck were obtained. Reformatted and 3-D images  were reviewed. Radiation dose reduction techniques were utilized,  including automated exposure control and exposure modulation based on  body size. AI analysis of LVO was utilized.     COMPARISON:  CT head without contrast 07/08/2021 at 8:38 PM.     FINDINGS:     Non Contrast CT Brain:     The ventricles and sulci are  within normal limits.  There is no  hydrocephalus.  No acute intracranial hemorrhage or pathologic  extra-axial collection is identified.  There is no midline shift or mass  effect.  The basal cisterns are patent. A few scattered foci of  subcortical and periventricular hypoattenuation are nonspecific but  favored to reflect mild small vessel ischemic disease. There is  redemonstration of a focal area of hypoattenuation involving the cortex  and subcortical superior right cerebellar hemisphere, measuring  approximately 2.3 x 1.6 cm, which is suspicious for possible acute  subacute infarct. There is calcific intracranial atherosclerosis. There  are trace bilateral mastoid effusions.     CT Angiography Head:     The visible internal carotid arteries, middle and anterior cerebral  arteries demonstrate normal contrast-related enhancement.  The anterior  communicating artery complex has a normal appearance. Bilateral  posterior communicating arteries are identified, right larger than left.     The left vertebral artery is dominant. The right V4 segment is diffusely  diminutive, likely developmental. There is a diminutive right P1  segment, which may be developmental. The vertebrobasilar circulation and  both posterior cerebral arteries demonstrate normal contrast-related  enhancement. The superior cerebellar arteries are duplicated  bilaterally. The right superior cerebellar arteries are asymmetrically  diminutive and poorly opacified distally. Bilateral PICA origins are  identified.     No aneurysm or other vascular malformation is identified.       CT Angiography Neck:     There is a 3 vessel aortic arch.  The origins of the vessels arising  from the arch are patent.  There is trace calcific atherosclerosis at  the bifurcation of the brachiocephalic artery.     The common carotid arteries have a normal caliber.  The carotid bulbs  are within normal limits.  The bilateral internal carotid arteries  demonstrate no  evidence of flow-limiting stenosis or occlusion. NASCET  criteria was utilized. The distal cervical left internal carotid artery  is slightly tortuous.     The vertebral arteries arise from the subclavian arteries.  The left  vertebral artery is dominant.  There is no evidence of flow-limiting  stenosis or occlusion of the vertebral arteries.     There are calcified bilateral palatine tonsilloliths. There is  centrilobular and paraseptal emphysema in the lung apices. There is mild  biapical pleuroparenchymal fibrosis, right and left. There are calcified  biapical pleural plaques, left greater than right. There is multilevel  degenerative disc disease. There is multifocal dental disease consisting  of dental caries and periapical lucencies, incompletely assessed.        IMPRESSION:     1.  Redemonstration of a small area of hypoattenuation in the superior  right cerebellar hemisphere, suspicious for an acute to subacute  infarct. This would be better assessed with MRI. The right superior  cerebellar artery is asymmetrically diminutive and poorly opacified  distally and may be occluded.  2.  Background mild small vessel ischemic disease.  3.  No acute intracranial hemorrhage.     Discussed with Rosemary, the patient's nurse, at 12:50 AM.     This report was finalized on 7/9/2021 12:49 AM by Dr. Kerrie Etienne M.D.  EMERGENCY NONCONTRAST HEAD CT 07/08/2021     CLINICAL HISTORY: Vertigo that is not improving.     TECHNIQUE: Spiral CT images were obtained from the base of the skull to  the vertex without intravenous contrast. Images were re-formatted and  submitted in 3 mm thick axial CT sections with brain algorithm and 2 mm  thick sagittal and coronal reconstructions were performed and submitted  in brain algorithm.     COMPARISON: There are no prior studies from [facility] for comparison.     FINDINGS: On coronal reconstructed images 62 through 65 there is, and  also seen on axial images 18 through 20,  is a 1.7 x 1.5 cm area of low  density in the superior medial right cerebellum. This is nonspecific and  could be artifact, potentially it could be a subtle acute infarct.  Otherwise the brain parenchyma is normal in attenuation. The ventricles  are normal in size. I see no mass effect, no midline shift, no  extra-axial fluid collections are identified and there is no evidence of  acute intracranial hemorrhage. There is partial opacification of the  inferior left mastoid air cells with fluid; otherwise, the paranasal  sinuses, mastoid air cells and middle ear cavities are clear. Calvarium  and skull base are normal in appearance.     IMPRESSION:  1. Small amount of fluid partially opacifies the inferior left mastoid  air cells.   2. There is a subtle area of low-density extending through the superior  medial right cerebellum that involves the white matter and extends  through the superior medial right cerebellar cortex. It measures 17 x 15  x 15 mm in size. There is a nonspecific finding potentially it could be  artifact, potentially it could be an subtle to acute superior medial  right cerebellar infarct in the right superior cerebellar artery  territory and I recommended an MRI of the brain to further evaluate. The  remainder of the head CT is normal.      My findings and recommendation for an MRI of the brain to further  evaluate the superior right cerebellum were communicated to TRUPTI Jones by telephone on 07/08/2021 at 8:55 PM     Radiation dose reduction techniques were utilized, including automated  exposure control and exposure modulation based on body size.     This report was finalized on 7/9/2021 6:33 AM by Dr. Curry Barrett M.D.       Impression: This is a 60-year-old male with history of apical thrombus (supposed to be on Eliquis and plan prior to arrival although reports noncompliance), hypertension, sinus bradycardia, CAD, tobacco abuse/current smoker, and CKD who presented to [facility]   due to complaint of room spinning vertigo with associated vomiting.  Initial CT of the head showed subtle area of low density in right cerebellum thought to reflect acute infarct.  No mass no hemorrhage.  CTA of the head and neck again redemonstrated area of hypoattenuation in the superior right cerebellar hemisphere, evidence of mild small vessel ischemic disease, no evidence of dissection/aneurysm and/or occlusion noted.    Neurologically, intact with some vertigo with lateral eye movements.  MRI of the brain since confirmed right cerebellar infarct.  TTE pending.  Will consult cardiology to weigh in on anticoagulation/antiplatelet along with consideration of OSITO although noncompliance reported.  Other recommendations below.PT/OT/ST. CCP to assist with discharge planning. Call RRT for any acute neurological changes and/or concerns. We will continue to follow and advise.       Plan:  Cardiology consult  Transfer to [facility] for 5 S.-stroke unit  Repeat CT head in the morning to further evaluate for stroke expansion  TTE  Labs: Lipid panel, A1c  Eliquis 5 mg twice daily-reported medication noncompliance prior to arrival was supposed to be on full dose Eliquis and Plavix 75 mg daily along with lisinopril and metoprolol  Lipitor 80 mg daily  Neurochecks  BP control  Stroke Education  LINDA/SCDs  PT/OT/ST      Case reviewed with attending neurologist Dr. Vidla Rodriguez and he agrees with treatment plan above.    TRUPTI White      Electronically signed by Cathi Zapien APRN at 21 1420          Consult Notes       Keyur Becerra MD at 21 [id number]          DOS: 2021  NAME: Damien Peña   : 1960  PCP: Provider, No Known  CC: Stroke  Referring MD: Kerrie Jaquez nurse practitioner.    Neurological Problem and Interval History:  60 y.o. right-handed white male with a Hx of Paroxysmal atrial fibrillation who is currently on Eliquis and has chronic kidney disease as well as a smoker  with more than 50-pack-year smoking history was eating cereals this evening when he started noticing that the whole room was spinning and he started throwing up and brought to the emergency room.  There he got the vertigo cocktail and he became very sleepy.  Urgent CT of the brain was performed which was read by Dr. Curry Barrett as showing some abnormality in the cerebellum and so neurology was consulted.  When I came to see the patient I saw him laying in bed and he was asleep but he was easily arousable and answers my questions appropriately.  He says he takes numerous medications for his cardiac disease as well as high blood pressure.  He is also a smoker and lives independently at home by himself.  He has also been noticing ringing sensation in his ears as well as some hearing loss.  Otoscopy did not show any evidence of earwax impaction.  He was able to follow one-step, two-step and three-step commands well and there were no focal neurological deficits and the NIH stroke scale was 0.  Past Medical/Surgical Hx:  Past Medical History:   Diagnosis Date   • Bradycardia    • CAD (coronary artery disease)    • Chronic kidney disease    • Tobacco abuse      Past Surgical History:   Procedure Laterality Date   • APPENDECTOMY     • CARDIAC CATHETERIZATION     • COLONOSCOPY     • CORONARY STENT PLACEMENT     • ECHO - CONVERTED  2020       Review of Systems:    Constitutional: Pleasant gentleman currently laying in bed very sleepy  Cardiovascular: Denies any chest pain or palpitations.  Respiratory: Denies any shortness of breath.  Gastrointestinal: Had been earlier very nauseated and was throwing up but later on after administration of the vertigo cocktail is feeling better.  Genitourinary: Denies any incontinence.  Musculoskeletal: Denies any aches and pains in the body.  Dermatological: Denies any skin rashes.  Neurological: Except for the vertigo no other focal neurological deficits.  Psychiatric: Denies any underlying  depression or anxiety.  Ophthalmological: Denies any vision changes.          Medications On Admission  He has been on Eliquis.    Allergies:  No Known Allergies    Social Hx:  Social History     Socioeconomic History   • Marital status: Single     Spouse name: Not on file   • Number of children: Not on file   • Years of education: Not on file   • Highest education level: Not on file   Tobacco Use   • Smoking status: Current Every Day Smoker     Packs/day: 1.50     Years: 30.00     Pack years: 45.00     Types: Cigarettes   • Smokeless tobacco: Never Used   • Tobacco comment: Patient is advised to quit smoking   Vaping Use   • Vaping Use: Never used   Substance and Sexual Activity   • Alcohol use: Yes     Comment: beer occasionally   • Drug use: Never   • Sexual activity: Defer       Family Hx:  Family History   Problem Relation Age of Onset   • Heart disease Mother    • Coronary artery disease Father    • Heart disease Father        Review of Imaging (Interpretation of images not reports): Initial CT of the brain was performed which was reviewed by me on the PACS:        Additional Tests Performed: CT angiogram of the head and neck with contrast showed the following:  FINDINGS:     Non Contrast CT Brain:     The ventricles and sulci are within normal limits.  There is no  hydrocephalus.  No acute intracranial hemorrhage or pathologic  extra-axial collection is identified.  There is no midline shift or mass  effect.  The basal cisterns are patent. A few scattered foci of  subcortical and periventricular hypoattenuation are nonspecific but  favored to reflect mild small vessel ischemic disease. There is  redemonstration of a focal area of hypoattenuation involving the cortex  and subcortical superior right cerebellar hemisphere, measuring  approximately 2.3 x 1.6 cm, which is suspicious for possible acute  subacute infarct. There is calcific intracranial atherosclerosis. There  are trace bilateral mastoid effusions.      CT Angiography Head:     The visible internal carotid arteries, middle and anterior cerebral  arteries demonstrate normal contrast-related enhancement.  The anterior  communicating artery complex has a normal appearance. Bilateral  posterior communicating arteries are identified, right larger than left.     The left vertebral artery is dominant. The right V4 segment is diffusely  diminutive, likely developmental. There is a diminutive right P1  segment, which may be developmental. The vertebrobasilar circulation and  both posterior cerebral arteries demonstrate normal contrast-related  enhancement. The superior cerebellar arteries are duplicated  bilaterally. The right superior cerebellar arteries are asymmetrically  diminutive and poorly opacified distally. Bilateral PICA origins are  identified.     No aneurysm or other vascular malformation is identified.       CT Angiography Neck:     There is a 3 vessel aortic arch.  The origins of the vessels arising  from the arch are patent.  There is trace calcific atherosclerosis at  the bifurcation of the brachiocephalic artery.     The common carotid arteries have a normal caliber.  The carotid bulbs  are within normal limits.  The bilateral internal carotid arteries  demonstrate no evidence of flow-limiting stenosis or occlusion. NASCET  criteria was utilized. The distal cervical left internal carotid artery  is slightly tortuous.     The vertebral arteries arise from the subclavian arteries.  The left  vertebral artery is dominant.  There is no evidence of flow-limiting  stenosis or occlusion of the vertebral arteries.     There are calcified bilateral palatine tonsilloliths. There is  centrilobular and paraseptal emphysema in the lung apices. There is mild  biapical pleuroparenchymal fibrosis, right and left. There are calcified  biapical pleural plaques, left greater than right. There is multilevel  degenerative disc disease. There is multifocal dental disease  "consisting  of dental caries and periapical lucencies, incompletely assessed.        IMPRESSION:     1.  Redemonstration of a small area of hypoattenuation in the superior  right cerebellar hemisphere, suspicious for an acute to subacute  infarct. This would be better assessed with MRI. The right superior  cerebellar artery is asymmetrically diminutive and poorly opacified  distally and may be occluded.  2.  Background mild small vessel ischemic disease.  3.  No acute intracranial hemorrhage.  .  MRI of the brain with stroke protocol as well as with thin sections through bilateral internal auditory canals shows the following:        Laboratory Results:   Lab Results   Component Value Date    GLUCOSE 148 (H) 07/08/2021    CALCIUM 8.4 (L) 07/08/2021     07/08/2021    K 3.4 (L) 07/08/2021    CO2 24.5 07/08/2021     07/08/2021    BUN 11 07/08/2021    CREATININE 1.06 07/08/2021    EGFRIFNONA 72 07/08/2021    BCR 10.4 07/08/2021    ANIONGAP 7.5 07/08/2021     Lab Results   Component Value Date    WBC 8.38 07/08/2021    HGB 16.3 07/08/2021    HCT 46.7 07/08/2021    MCV 96.5 07/08/2021     07/08/2021       Lab Results   Component Value Date    INR 1.15 (H) 03/22/2020    INR 1.11 (H) 03/21/2020    INR 1.08 03/20/2020    PROTIME 11.8 (H) 03/22/2020    PROTIME 11.4 03/21/2020    PROTIME 11.2 03/20/2020         Physical Examination:  /80   Pulse 68   Temp 97.1 °F (36.2 °C) (Temporal)   Resp 16   Ht 177.8 cm (70\")   Wt 63.5 kg (140 lb)   SpO2 97%   BMI 20.09 kg/m²   General Appearance:   Well developed, well nourished, well groomed, alert, and cooperative.  HEENT: Normocephalic.  Normal fundoscopic exam including normal retina, discs are flat with sharp margins, normal vasculature.  Neck and Spine: Normal range of motion.  Normal alignment. No mass or tenderness. No bruits.  Cardiac: Regular rate and rhythm. No murmurs.  Peripheral Vasculature: Radial and pedal pulses are equal and symmetric. No " signs of distal embolization.  Extremities:    No edema or deformities. Normal joint ROM. LINDA hoses and SCD's in place  Skin:    No rashes or birth marks.    Neurological examination:  Higher Integrative  Function: Oriented to time, place and person. Normal registration, recall, attention span and concentration. Normal language including comprehension, spontaneous speech, repetition, reading, writing, naming and vocabulary. No neglect with normal visual-spatial function and construction. Normal fund of knowledge and higher integrative function.  CN II: Pupils are equal, round, and reactive to light. Normal visual acuity and visual fields.    CN III IV VI: Extraocular movements are full without nystagmus.   CN V: Normal facial sensation and strength of muscles of mastication.  CN VII: Facial movements are symmetric. No weakness.  CN VIII:   Auditory acuity is normal.  CN IX & X:   Symmetric palatal movement.  CN XI: Sternocleidomastoid and trapezius are normal.  No weakness.  CN XII:   The tongue is midline.  No atrophy or fasciculations.  Motor: Normal muscle strength, bulk and tone in upper and lower extremities.  No fasciculations, rigidity, spasticity, or abnormal movements.  Reflexes: 2+ in the upper and lower extremities. Plantar responses are flexor.  Sensation: Normal to light touch, pinprick, vibration, temperature, and proprioception in arms and legs. Normal graphesthesia and no extinction on DSS.  Station and Gait: Gait and Romberg could not be tested at this time.    Coordination: Finger to nose test shows no dysmetria.  Rapid alternating movements are normal.  Heel to shin normal.    NIHSS:    Baseline  0-->Alert: keenly responsive  0-->Answers both questions correctly  0-->Performs both tasks correctly  0=normal  0=No visual loss  0=Normal symmetric movement  0-->No drift: limb holds 90 (or 45) degrees for full 10 secs  0-->No drift: limb holds 90 (or 45) degrees for full 10 secs  0-->No drift: limb  holds 90 (or 45) degrees for full 10 secs  0-->No drift: limb holds 90 (or 45) degrees for full 10 secs  0=Absent  0=Normal; no sensory loss  0=No aphasia, normal  0=Normal  0=No abnormality    Total score: 0    Diagnoses / Discussion:  60 y.o. who presents with Sx of acute onset of vertigo along with nausea and vomiting and feeling extremely lightheaded and off balance and feeling the room spinning currently feels better with the vertigo cocktail being administered.  The initial head CT was showing hypodensity in the right superior cerebellar territory.  The CT angiogram of the head and neck with contrast was showing lack of contrast in the right superior cerebral artery in the distal and suggestive of possible thrombosis versus vasoconstriction.  An MRI of the brain confirmed acute stroke.  The patient was not a candidate for any acute thrombolytic therapy as the stroke has already been completed at the NIH stroke scale is 0 and also not a candidate for any thrombectomy as there was no evidence of any retrievable thrombus..    Plan:  Eliquis 5 mg twice daily with food  Hydrate  Neuro checks  Check lipid panel, Hgb A1C  Lipitor 80 mg  Non-pharmacological DVT prophylaxis  Echocardiogram has been requested  EKG Tele  PT/OT/ST with stroke protocol.    Stroke Education  Blood pressure control to <130/80  Goal LDL <70-recommend high dose statins-   Serum glucose < 140  NIH stroke scale assessment has been requested as per protocol.  Discussed signs and symptoms of stroke and when to call 911. Instructed to follow a low fat diet including the Mediterranean diet. Instructed to take all medications daily as prescribed. Encouraged 30 minutes of exercise 3-4 times a week as tolerated. Stay well hydrated. Discussed potential side effects of new medications and signs and symptoms to report. If patient is currently using tobacco products, smoking cessation was encouraged. Reviewed stroke risk factors and stroke prevention plan.  Patient verbalizes understanding and agrees with plan.     I have discussed the above with the patient and ER team.  He will be followed up by our inpatient neurology service.  Time spent with patient: 60min    Coding    Dictated using Dragon dictation.              Electronically signed by Keyur Becerra MD at 07/09/21 4325

## 2021-07-09 NOTE — CONSULTS
Date of Hospital Visit: 7/10/2021  Encounter Provider: Roshan Johnson Jr, MD  Place of Service: [facility]  Patient Name: Damien Peña  :1960  Referral Provider: Osmin Shannon MD    Chief complaint: Dizziness and fall    History of Present Illness: Mr. Charlton is a 60-year-old male with a past medical history of coronary artery disease status post PCI in  and subsequent PTCA in , renal artery thromboembolism, cardiomyopathy and tobacco abuse who presents with dizziness and falls.      He presented to the 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  Echo performed at that time showed EF 45 to 50% with apical thrombus and wall motion abnormalities consistent with previous anteroapical infarct.  He was started on Eliquis.    In 2020 had a stress test which showed large anterior and apical MI with no blayne-infarct ischemia.  No apical thrombus was noted that time.  He was seen in the office with Dr. Campos in 2020 and aspirin was stopped but he was continued on Plavix and Eliquis.    He presented to the emergency room on  with acute onset of vertiginous symptoms with nausea, vomiting and dizziness.  EMS was called and he received Zofran and IV fluids in route.  His symptoms were worse with opening his eyes.  CT performed showing perfusion defect in the right superior cerebral artery suggestive of possible thrombus versus vasoconstriction.  MRI of brain confirmed acute CVA.  He was not a candidate for thrombectomy as there was no evidence for retrievable thrombus.  Patient admits that he is noncompliant with medications.  Unfortunately continues to smoke cigarettes.  He drinks socially and denies illicit drug use.  Family history was reviewed and not pertinent to this clinic visit.      Past Medical History:   Diagnosis Date   • Bradycardia    • CAD  (coronary artery disease)    • Chronic kidney disease    • Tobacco abuse        Past Surgical History:   Procedure Laterality Date   • APPENDECTOMY     • CARDIAC CATHETERIZATION     • COLONOSCOPY     • CORONARY STENT PLACEMENT     • ECHO - CONVERTED  2020       Medications Prior to Admission   Medication Sig Dispense Refill Last Dose   • apixaban (ELIQUIS) 5 MG tablet tablet Take 1 tablet by mouth Every 12 (Twelve) Hours. Indications: DVT/PE (active thrombosis) 180 tablet 2 7/8/2021 at Unknown time   • clopidogrel (PLAVIX) 75 MG tablet TAKE 1 TABLET BY MOUTH EVERY DAY 90 tablet 2 7/8/2021 at Unknown time   • lisinopril (PRINIVIL,ZESTRIL) 2.5 MG tablet TAKE 1 TABLET BY MOUTH EVERY DAY 90 tablet 0 7/8/2021 at Unknown time   • aspirin 81 MG tablet Take 1 tablet by mouth Daily. (Patient not taking: Reported on 7/9/2021) 30 tablet 2 Not Taking at Unknown time   • metoprolol succinate XL (TOPROL-XL) 25 MG 24 hr tablet Take 1 tablet by mouth Daily. (Patient not taking: Reported on 7/9/2021) 90 tablet 3 Not Taking at Unknown time       Current Meds  Scheduled Meds:apixaban, 5 mg, Oral, Q12H  atorvastatin, 40 mg, Oral, Nightly  clopidogrel, 75 mg, Oral, Daily  nicotine, 1 patch, Transdermal, Q24H      Continuous Infusions:sodium chloride, 100 mL/hr, Last Rate: 100 mL/hr (07/10/21 1637)      PRN Meds:.ondansetron  •  [COMPLETED] Insert peripheral IV **AND** sodium chloride    Allergies as of 07/08/2021   • (No Known Allergies)       Social History     Socioeconomic History   • Marital status: Single     Spouse name: Not on file   • Number of children: Not on file   • Years of education: Not on file   • Highest education level: Not on file   Tobacco Use   • Smoking status: Current Every Day Smoker     Packs/day: 1.50     Years: 30.00     Pack years: 45.00     Types: Cigarettes   • Smokeless tobacco: Never Used   • Tobacco comment: Patient is advised to quit smoking   Vaping Use   • Vaping Use: Never used   Substance and Sexual  "Activity   • Alcohol use: Yes     Comment: beer occasionally   • Drug use: Never   • Sexual activity: Defer       Family History   Problem Relation Age of Onset   • Heart disease Mother    • Coronary artery disease Father    • Heart disease Father        Review of Systems   Constitutional: Negative for chills and fever.   HENT: Negative for hoarse voice and sore throat.    Eyes: Negative for double vision and photophobia.   Cardiovascular: Negative for chest pain, leg swelling, near-syncope, orthopnea, palpitations, paroxysmal nocturnal dyspnea and syncope.   Respiratory: Negative for cough and wheezing.    Skin: Negative for poor wound healing and rash.   Musculoskeletal: Negative for arthritis and joint swelling.   Gastrointestinal: Negative for bloating, abdominal pain, hematemesis and hematochezia.   Neurological: Positive for dizziness. Negative for focal weakness.   Psychiatric/Behavioral: Negative for depression and suicidal ideas.            Objective:   Temp:  [97.7 °F (36.5 °C)-98.3 °F (36.8 °C)] 97.7 °F (36.5 °C)  Heart Rate:  [52-64] 62  Resp:  [16-18] 16  BP: ()/(62-72) 99/69  Body mass index is 19.6 kg/m².  Flowsheet Rows      First Filed Value   Admission Height  177.8 cm (70\") Documented at 07/08/2021 1711   Admission Weight  63.5 kg (140 lb) Documented at 07/08/2021 1711        Vitals:    07/10/21 1309   BP: 99/69   Pulse: 62   Resp: 16   Temp: 97.7 °F (36.5 °C)   SpO2:        Vitals reviewed.   Constitutional:       Appearance: Healthy appearance. Not in distress.   Neck:      Vascular: No JVR. JVD normal.   Pulmonary:      Effort: Pulmonary effort is normal.      Breath sounds: Normal breath sounds. No wheezing. No rhonchi. No rales.   Chest:      Chest wall: Not tender to palpatation.   Cardiovascular:      PMI at left midclavicular line. Normal rate. Regular rhythm. Normal S1. Normal S2.      Murmurs: There is no murmur.      No gallop. No click. No rub.   Pulses:     Intact distal pulses. "   Edema:     Peripheral edema absent.   Abdominal:      General: Bowel sounds are normal.      Palpations: Abdomen is soft.      Tenderness: There is no abdominal tenderness.   Musculoskeletal: Normal range of motion.         General: No tenderness. Skin:     General: Skin is warm and dry.   Neurological:      General: No focal deficit present.      Mental Status: Alert and oriented to person, place and time.                 Lab Review:      Results from last 7 days   Lab Units 07/10/21  0616 07/08/21  1710   SODIUM mmol/L 140 138   POTASSIUM mmol/L 4.0 3.4*   CHLORIDE mmol/L 111* 106   CO2 mmol/L 23.3 24.5   BUN mg/dL 9 11   CREATININE mg/dL 0.99 1.06   CALCIUM mg/dL 8.0* 8.4*   BILIRUBIN mg/dL  --  0.5   ALK PHOS U/L  --  53   ALT (SGPT) U/L  --  12   AST (SGOT) U/L  --  13   GLUCOSE mg/dL 90 148*     Results from last 7 days   Lab Units 07/08/21  1710   TROPONIN T ng/mL <0.010     @LABRCNTbnp@  Results from last 7 days   Lab Units 07/10/21  0616 07/08/21  1710   WBC 10*3/mm3 8.69 8.38   HEMOGLOBIN g/dL 14.8 16.3   HEMATOCRIT % 42.1 46.7   PLATELETS 10*3/mm3 169 189             @LABRCNTIP(chol,trig,hdl,ldl)    I personally viewed and interpreted the patient's EKG/Telemetry data  )  Patient Active Problem List   Diagnosis   • Kidney infarction (CMS/HCC)   • Coronary artery disease involving native coronary artery of native heart without angina pectoris   • Tobacco abuse   • CKD (chronic kidney disease)   • Bradycardia, sinus   • Vertigo   • Essential hypertension   • 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 Left ventricular thrombus -echo performed today showing apical thrombus with no mobile elements.  The echocardiographic appearance is consistent with low embolic potential.  Patient is back on apixaban.  Long discussion about the need for compliance.  Family was at bedside and all questions were answered.  2. CAD -history of anterior MI with only  mild dysfunction of the left ventricle.  Recommend monotherapy with Plavix given need for apixaban.  Restart beta-blocker on discharge and continue statin.  3. Tobacco abuse counseled on need for cessation and abstinence from tobacco products -     Cardiovascular issues are stable.  I will see the patient as needed.  I see no indication for OSITO and have discussed the case with Dr. Cazares.  Patient will need to follow-up with Dr. Campos in the [place] clinic.    Roshan Johnson Jr, MD  07/10/21  19:13 EDT.  Time spent in reviewing chart, discussion and examination:

## 2021-07-09 NOTE — PROGRESS NOTES
"DOS: 2021  NAME: Damien Peña   : 1960  PCP: Provider, No Known  Chief Complaint   Patient presents with   • Vomiting   • Nausea   Patient seen in follow-up today; new to me        Stroke    Subjective: No events overnight.  Patient reports some vertigo/vision changes with rapid turning of head left to right and gait instability; no unilateral weakness noted/reported.      Daughter at bedside; updated regarding diagnosis/prognosis/outpatient treatment recommendation.  Discussed at length smoking cessation and need for medication compliance in the future    Objective:  Vital signs: /71 (BP Location: Left arm, Patient Position: Lying)   Pulse 50   Temp 97.4 °F (36.3 °C) (Oral)   Resp 16   Ht 172.7 cm (68\")   Wt 57.6 kg (126 lb 14.4 oz)   SpO2 95%   BMI 19.30 kg/m²       HEENT: Normocephalic, atraumatic   COR: RRR  Resp: Even and unlabored  Extremities: Equal pulses, nondistal embolization    Neurological:   MS: AO. Language normal. No neglect. Higher integrative function normal  CN: II-XII normal except intermittent \"visual changes\"-not detected on exam  Motor: 5/5, normal tone  Reflexes: Toes downgoing  Sensory: Intact-to light touch  Coordination: Normal-finger-to-nose    Laboratory results:  Lab Results   Component Value Date    GLUCOSE 148 (H) 2021    CALCIUM 8.4 (L) 2021     2021    K 3.4 (L) 2021    CO2 24.5 2021     2021    BUN 11 2021    CREATININE 1.06 2021    EGFRIFNONA 72 2021    BCR 10.4 2021    ANIONGAP 7.5 2021     Lab Results   Component Value Date    WBC 8.38 2021    HGB 16.3 2021    HCT 46.7 2021    MCV 96.5 2021     2021     No results found for: CHOL  No results found for: HDL  No results found for: LDL  No results found for: TRIG   No results found for: HGBA1C                   Review and interpretation of imaging:  MRI OF THE BRAIN WITH AND WITHOUT " CONTRAST 07/09/2021     CLINICAL HISTORY: Patient complains of dizziness and nausea.     TECHNIQUE: Axial T1, FLAIR, fat-suppressed T2, axial diffusion and  gradient echo T2, sagittal T1 and postcontrast axial fat-suppressed T1  and coronal T1-weighted images were obtained of the entire head. In  addition, thin cut axial T2 and thin cut pre and postcontrast axial and  coronal T1-weighted images were obtained through the internal auditory  canals.     COMPARISON: This is correlated to yesterday's noncontrast head CT  07/08/2021.     FINDINGS: There is a focal area of intense increased signal intensity on  the diffusion-weighted images and high signal on the FLAIR and  T2-weighted images, low signal on the T1-weighted images, and marked  hypointensity on ADC maps indicating true restricted diffusion and  cytotoxic edema extending through the superior medial right cerebellum  that measures approximately 2.2 x 2.4 x 2.3 cm in anterior posterior,  medial lateral and craniocaudal dimension, is consistent with an acute  infarct in the distribution of the superior cerebellar branches of the  right superior cerebellar artery territory. The remainder of the brain  parenchyma is normal in signal intensity. The ventricles are normal in  size. I see no focal mass effect and no midline shift and no extra-axial  fluid collections are identified. No abnormal areas of enhancement are  seen in the head. The paranasal sinuses are clear. There is a tiny  amount of fluid in the inferior tip of the right mastoid air cells,  partial opacification inferior left mastoid air cells with fluid. There  is some T2 hyperintensity within the post PICA segment of the small size  right vertebral artery and the post PICA segment is tiny in diameter. On  yesterday's CT angiogram of the head and neck that appeared to be  patent, and it probably has slow flow due to its small size. Otherwise,  good flow voids are demonstrated within the cerebral vessels  and in the  dural venous sinuses.     Thin cut images through the internal auditory canals demonstrate no  abnormal enhancement within the cerebellopontine angles or within the  internal auditory canals or within the substance of the temporal bones.     IMPRESSION:  1. Small amount of fluid in the inferior mastoid air cells bilaterally.  2. There is a 2.2 x 2.4 x 2.3 cm acute infarct in the superior medial  right cerebellum within the right superior cerebellar artery territory.  There is some T2 high signal within the post PICA segment of the small  size right vertebral artery, and the post PICA segment appeared patent  on yesterday's CT angiogram. It likely has slow or turbulent flow due to  its small size. The remainder of the MRI of the brain is normal. Thin  cut images through the internal auditory canals are also normal. The  results were communicated to Keyur Becerra MD, the stroke  neurologist taking care of the patient by telephone on 07/09/2021 at  9:00 AM.     This report was finalized on 7/9/2021 11:56 AM by Dr. Curry Barrett M.D.     PROCEDURE: CT ANGIOGRAM HEAD W AI ANALYSIS OF LVO-, CT ANGIOGRAM NECK-     HISTORY:  Cerebellar infarcts, nausea, vomiting, dizziness.     TECHNIQUE: CT images of the brain were obtained without intravenous  contrast. Following the administration of intravenous contrast, CT  images of the head and neck were obtained. Reformatted and 3-D images  were reviewed. Radiation dose reduction techniques were utilized,  including automated exposure control and exposure modulation based on  body size. AI analysis of LVO was utilized.     COMPARISON:  CT head without contrast 07/08/2021 at 8:38 PM.     FINDINGS:     Non Contrast CT Brain:     The ventricles and sulci are within normal limits.  There is no  hydrocephalus.  No acute intracranial hemorrhage or pathologic  extra-axial collection is identified.  There is no midline shift or mass  effect.  The basal cisterns are patent.  A few scattered foci of  subcortical and periventricular hypoattenuation are nonspecific but  favored to reflect mild small vessel ischemic disease. There is  redemonstration of a focal area of hypoattenuation involving the cortex  and subcortical superior right cerebellar hemisphere, measuring  approximately 2.3 x 1.6 cm, which is suspicious for possible acute  subacute infarct. There is calcific intracranial atherosclerosis. There  are trace bilateral mastoid effusions.     CT Angiography Head:     The visible internal carotid arteries, middle and anterior cerebral  arteries demonstrate normal contrast-related enhancement.  The anterior  communicating artery complex has a normal appearance. Bilateral  posterior communicating arteries are identified, right larger than left.     The left vertebral artery is dominant. The right V4 segment is diffusely  diminutive, likely developmental. There is a diminutive right P1  segment, which may be developmental. The vertebrobasilar circulation and  both posterior cerebral arteries demonstrate normal contrast-related  enhancement. The superior cerebellar arteries are duplicated  bilaterally. The right superior cerebellar arteries are asymmetrically  diminutive and poorly opacified distally. Bilateral PICA origins are  identified.     No aneurysm or other vascular malformation is identified.       CT Angiography Neck:     There is a 3 vessel aortic arch.  The origins of the vessels arising  from the arch are patent.  There is trace calcific atherosclerosis at  the bifurcation of the brachiocephalic artery.     The common carotid arteries have a normal caliber.  The carotid bulbs  are within normal limits.  The bilateral internal carotid arteries  demonstrate no evidence of flow-limiting stenosis or occlusion. NASCET  criteria was utilized. The distal cervical left internal carotid artery  is slightly tortuous.     The vertebral arteries arise from the subclavian arteries.  The  left  vertebral artery is dominant.  There is no evidence of flow-limiting  stenosis or occlusion of the vertebral arteries.     There are calcified bilateral palatine tonsilloliths. There is  centrilobular and paraseptal emphysema in the lung apices. There is mild  biapical pleuroparenchymal fibrosis, right and left. There are calcified  biapical pleural plaques, left greater than right. There is multilevel  degenerative disc disease. There is multifocal dental disease consisting  of dental caries and periapical lucencies, incompletely assessed.        IMPRESSION:     1.  Redemonstration of a small area of hypoattenuation in the superior  right cerebellar hemisphere, suspicious for an acute to subacute  infarct. This would be better assessed with MRI. The right superior  cerebellar artery is asymmetrically diminutive and poorly opacified  distally and may be occluded.  2.  Background mild small vessel ischemic disease.  3.  No acute intracranial hemorrhage.     Discussed with Rosemary, the patient's nurse, at 12:50 AM.     This report was finalized on 7/9/2021 12:49 AM by Dr. Kerrie Etienne M.D.  EMERGENCY NONCONTRAST HEAD CT 07/08/2021     CLINICAL HISTORY: Vertigo that is not improving.     TECHNIQUE: Spiral CT images were obtained from the base of the skull to  the vertex without intravenous contrast. Images were re-formatted and  submitted in 3 mm thick axial CT sections with brain algorithm and 2 mm  thick sagittal and coronal reconstructions were performed and submitted  in brain algorithm.     COMPARISON: There are no prior studies from [facility] for comparison.     FINDINGS: On coronal reconstructed images 62 through 65 there is, and  also seen on axial images 18 through 20, is a 1.7 x 1.5 cm area of low  density in the superior medial right cerebellum. This is nonspecific and  could be artifact, potentially it could be a subtle acute infarct.  Otherwise the brain parenchyma is normal in  attenuation. The ventricles  are normal in size. I see no mass effect, no midline shift, no  extra-axial fluid collections are identified and there is no evidence of  acute intracranial hemorrhage. There is partial opacification of the  inferior left mastoid air cells with fluid; otherwise, the paranasal  sinuses, mastoid air cells and middle ear cavities are clear. Calvarium  and skull base are normal in appearance.     IMPRESSION:  1. Small amount of fluid partially opacifies the inferior left mastoid  air cells.   2. There is a subtle area of low-density extending through the superior  medial right cerebellum that involves the white matter and extends  through the superior medial right cerebellar cortex. It measures 17 x 15  x 15 mm in size. There is a nonspecific finding potentially it could be  artifact, potentially it could be an subtle to acute superior medial  right cerebellar infarct in the right superior cerebellar artery  territory and I recommended an MRI of the brain to further evaluate. The  remainder of the head CT is normal.      My findings and recommendation for an MRI of the brain to further  evaluate the superior right cerebellum were communicated to TRUPTI Jones by telephone on 07/08/2021 at 8:55 PM     Radiation dose reduction techniques were utilized, including automated  exposure control and exposure modulation based on body size.     This report was finalized on 7/9/2021 6:33 AM by Dr. Curry Barrett M.D.       Impression: This is a 60-year-old male with history of apical thrombus (supposed to be on Eliquis and plan prior to arrival although reports noncompliance), hypertension, sinus bradycardia, CAD, tobacco abuse/current smoker, and CKD who presented to [facility] 7/8 due to complaint of room spinning vertigo with associated vomiting.  Initial CT of the head showed subtle area of low density in right cerebellum thought to reflect acute infarct.  No mass no hemorrhage.  CTA of  the head and neck again redemonstrated area of hypoattenuation in the superior right cerebellar hemisphere, evidence of mild small vessel ischemic disease, no evidence of dissection/aneurysm and/or occlusion noted.    Neurologically, intact with some vertigo with lateral eye movements.  MRI of the brain since confirmed right cerebellar infarct.  TTE pending.  Will consult cardiology to weigh in on anticoagulation/antiplatelet along with consideration of OSITO although noncompliance reported.  Other recommendations below.PT/OT/ST. CCP to assist with discharge planning. Call RRT for any acute neurological changes and/or concerns. We will continue to follow and advise.       Plan:  Cardiology consult  Transfer to [facility], 5 N. for 5 S.-stroke unit  Repeat CT head in the morning to further evaluate for stroke expansion  TTE  Labs: Lipid panel, A1c  Eliquis 5 mg twice daily-reported medication noncompliance prior to arrival was supposed to be on full dose Eliquis and Plavix 75 mg daily along with lisinopril and metoprolol  Lipitor 80 mg daily  Neurochecks  BP control  Stroke Education  LINDA/SCDs  PT/OT/ST      Case reviewed with attending neurologist Dr. Vidal Rodriguez and he agrees with treatment plan above.    TRUPTI White

## 2021-07-09 NOTE — PLAN OF CARE
Goal Outcome Evaluation:  Plan of Care Reviewed With: patient        Progress: no change  Outcome Summary: Pt was given zofran by EMS and N/V continued in ER. Pt was than given Ativan 1mg and has been sleepy ever since. Pt is still very sleepy and has not wanted to be interactive. NIHS score 0. Pt has not wanted to cooperate. States that he is a . MRI this am. Called results to [facility] from the CTA scan. Neurology has seen the pt in the ER. Pt HR been in the low 50's. BP little low. Pt states that he has had 2 stents about 2014 or 2016. Pt cant remember when but states it was in [place]. Pt oriented. Pt daughter will bring in cell phone so pt can give us medication list. Pt is not compliant with medication. Attempted to get MRI screen sheet completed but pt could not answer cardiac stent questions and did not want to be bothered with signing anything. Cont to monitor, safety maintained.

## 2021-07-09 NOTE — CONSULTS
DOS: 2021  NAME: Damien Peña   : 1960  PCP: Provider, No Known  CC: Stroke  Referring MD: Kerrie Jaquez nurse practitioner.    Neurological Problem and Interval History:  60 y.o. right-handed white male with a Hx of Paroxysmal atrial fibrillation who is currently on Eliquis and has chronic kidney disease as well as a smoker with more than 50-pack-year smoking history was eating cereals this evening when he started noticing that the whole room was spinning and he started throwing up and brought to the emergency room.  There he got the vertigo cocktail and he became very sleepy.  Urgent CT of the brain was performed which was read by Dr. Curry Barrett as showing some abnormality in the cerebellum and so neurology was consulted.  When I came to see the patient I saw him laying in bed and he was asleep but he was easily arousable and answers my questions appropriately.  He says he takes numerous medications for his cardiac disease as well as high blood pressure.  He is also a smoker and lives independently at home by himself.  He has also been noticing ringing sensation in his ears as well as some hearing loss.  Otoscopy did not show any evidence of earwax impaction.  He was able to follow one-step, two-step and three-step commands well and there were no focal neurological deficits and the NIH stroke scale was 0.  Past Medical/Surgical Hx:  Past Medical History:   Diagnosis Date   • Bradycardia    • CAD (coronary artery disease)    • Chronic kidney disease    • Tobacco abuse      Past Surgical History:   Procedure Laterality Date   • APPENDECTOMY     • CARDIAC CATHETERIZATION     • COLONOSCOPY     • CORONARY STENT PLACEMENT     • ECHO - CONVERTED         Review of Systems:    Constitutional: Pleasant gentleman currently laying in bed very sleepy  Cardiovascular: Denies any chest pain or palpitations.  Respiratory: Denies any shortness of breath.  Gastrointestinal: Had been earlier very nauseated and was  throwing up but later on after administration of the vertigo cocktail is feeling better.  Genitourinary: Denies any incontinence.  Musculoskeletal: Denies any aches and pains in the body.  Dermatological: Denies any skin rashes.  Neurological: Except for the vertigo no other focal neurological deficits.  Psychiatric: Denies any underlying depression or anxiety.  Ophthalmological: Denies any vision changes.          Medications On Admission  He has been on Eliquis.    Allergies:  No Known Allergies    Social Hx:  Social History     Socioeconomic History   • Marital status: Single     Spouse name: Not on file   • Number of children: Not on file   • Years of education: Not on file   • Highest education level: Not on file   Tobacco Use   • Smoking status: Current Every Day Smoker     Packs/day: 1.50     Years: 30.00     Pack years: 45.00     Types: Cigarettes   • Smokeless tobacco: Never Used   • Tobacco comment: Patient is advised to quit smoking   Vaping Use   • Vaping Use: Never used   Substance and Sexual Activity   • Alcohol use: Yes     Comment: beer occasionally   • Drug use: Never   • Sexual activity: Defer       Family Hx:  Family History   Problem Relation Age of Onset   • Heart disease Mother    • Coronary artery disease Father    • Heart disease Father        Review of Imaging (Interpretation of images not reports): Initial CT of the brain was performed which was reviewed by me on the PACS:        Additional Tests Performed: CT angiogram of the head and neck with contrast showed the following:  FINDINGS:     Non Contrast CT Brain:     The ventricles and sulci are within normal limits.  There is no  hydrocephalus.  No acute intracranial hemorrhage or pathologic  extra-axial collection is identified.  There is no midline shift or mass  effect.  The basal cisterns are patent. A few scattered foci of  subcortical and periventricular hypoattenuation are nonspecific but  favored to reflect mild small vessel  ischemic disease. There is  redemonstration of a focal area of hypoattenuation involving the cortex  and subcortical superior right cerebellar hemisphere, measuring  approximately 2.3 x 1.6 cm, which is suspicious for possible acute  subacute infarct. There is calcific intracranial atherosclerosis. There  are trace bilateral mastoid effusions.     CT Angiography Head:     The visible internal carotid arteries, middle and anterior cerebral  arteries demonstrate normal contrast-related enhancement.  The anterior  communicating artery complex has a normal appearance. Bilateral  posterior communicating arteries are identified, right larger than left.     The left vertebral artery is dominant. The right V4 segment is diffusely  diminutive, likely developmental. There is a diminutive right P1  segment, which may be developmental. The vertebrobasilar circulation and  both posterior cerebral arteries demonstrate normal contrast-related  enhancement. The superior cerebellar arteries are duplicated  bilaterally. The right superior cerebellar arteries are asymmetrically  diminutive and poorly opacified distally. Bilateral PICA origins are  identified.     No aneurysm or other vascular malformation is identified.       CT Angiography Neck:     There is a 3 vessel aortic arch.  The origins of the vessels arising  from the arch are patent.  There is trace calcific atherosclerosis at  the bifurcation of the brachiocephalic artery.     The common carotid arteries have a normal caliber.  The carotid bulbs  are within normal limits.  The bilateral internal carotid arteries  demonstrate no evidence of flow-limiting stenosis or occlusion. NASCET  criteria was utilized. The distal cervical left internal carotid artery  is slightly tortuous.     The vertebral arteries arise from the subclavian arteries.  The left  vertebral artery is dominant.  There is no evidence of flow-limiting  stenosis or occlusion of the vertebral arteries.    "  There are calcified bilateral palatine tonsilloliths. There is  centrilobular and paraseptal emphysema in the lung apices. There is mild  biapical pleuroparenchymal fibrosis, right and left. There are calcified  biapical pleural plaques, left greater than right. There is multilevel  degenerative disc disease. There is multifocal dental disease consisting  of dental caries and periapical lucencies, incompletely assessed.        IMPRESSION:     1.  Redemonstration of a small area of hypoattenuation in the superior  right cerebellar hemisphere, suspicious for an acute to subacute  infarct. This would be better assessed with MRI. The right superior  cerebellar artery is asymmetrically diminutive and poorly opacified  distally and may be occluded.  2.  Background mild small vessel ischemic disease.  3.  No acute intracranial hemorrhage.  .  MRI of the brain with stroke protocol as well as with thin sections through bilateral internal auditory canals shows the following:        Laboratory Results:   Lab Results   Component Value Date    GLUCOSE 148 (H) 07/08/2021    CALCIUM 8.4 (L) 07/08/2021     07/08/2021    K 3.4 (L) 07/08/2021    CO2 24.5 07/08/2021     07/08/2021    BUN 11 07/08/2021    CREATININE 1.06 07/08/2021    EGFRIFNONA 72 07/08/2021    BCR 10.4 07/08/2021    ANIONGAP 7.5 07/08/2021     Lab Results   Component Value Date    WBC 8.38 07/08/2021    HGB 16.3 07/08/2021    HCT 46.7 07/08/2021    MCV 96.5 07/08/2021     07/08/2021       Lab Results   Component Value Date    INR 1.15 (H) 03/22/2020    INR 1.11 (H) 03/21/2020    INR 1.08 03/20/2020    PROTIME 11.8 (H) 03/22/2020    PROTIME 11.4 03/21/2020    PROTIME 11.2 03/20/2020         Physical Examination:  /80   Pulse 68   Temp 97.1 °F (36.2 °C) (Temporal)   Resp 16   Ht 177.8 cm (70\")   Wt 63.5 kg (140 lb)   SpO2 97%   BMI 20.09 kg/m²   General Appearance:   Well developed, well nourished, well groomed, alert, and " cooperative.  HEENT: Normocephalic.  Normal fundoscopic exam including normal retina, discs are flat with sharp margins, normal vasculature.  Neck and Spine: Normal range of motion.  Normal alignment. No mass or tenderness. No bruits.  Cardiac: Regular rate and rhythm. No murmurs.  Peripheral Vasculature: Radial and pedal pulses are equal and symmetric. No signs of distal embolization.  Extremities:    No edema or deformities. Normal joint ROM. LINDA hoses and SCD's in place  Skin:    No rashes or birth marks.    Neurological examination:  Higher Integrative  Function: Oriented to time, place and person. Normal registration, recall, attention span and concentration. Normal language including comprehension, spontaneous speech, repetition, reading, writing, naming and vocabulary. No neglect with normal visual-spatial function and construction. Normal fund of knowledge and higher integrative function.  CN II: Pupils are equal, round, and reactive to light. Normal visual acuity and visual fields.    CN III IV VI: Extraocular movements are full without nystagmus.   CN V: Normal facial sensation and strength of muscles of mastication.  CN VII: Facial movements are symmetric. No weakness.  CN VIII:   Auditory acuity is normal.  CN IX & X:   Symmetric palatal movement.  CN XI: Sternocleidomastoid and trapezius are normal.  No weakness.  CN XII:   The tongue is midline.  No atrophy or fasciculations.  Motor: Normal muscle strength, bulk and tone in upper and lower extremities.  No fasciculations, rigidity, spasticity, or abnormal movements.  Reflexes: 2+ in the upper and lower extremities. Plantar responses are flexor.  Sensation: Normal to light touch, pinprick, vibration, temperature, and proprioception in arms and legs. Normal graphesthesia and no extinction on DSS.  Station and Gait: Gait and Romberg could not be tested at this time.    Coordination: Finger to nose test shows no dysmetria.  Rapid alternating movements are  normal.  Heel to shin normal.    NIHSS:    Baseline  0-->Alert: keenly responsive  0-->Answers both questions correctly  0-->Performs both tasks correctly  0=normal  0=No visual loss  0=Normal symmetric movement  0-->No drift: limb holds 90 (or 45) degrees for full 10 secs  0-->No drift: limb holds 90 (or 45) degrees for full 10 secs  0-->No drift: limb holds 90 (or 45) degrees for full 10 secs  0-->No drift: limb holds 90 (or 45) degrees for full 10 secs  0=Absent  0=Normal; no sensory loss  0=No aphasia, normal  0=Normal  0=No abnormality    Total score: 0    Diagnoses / Discussion:  60 y.o. who presents with Sx of acute onset of vertigo along with nausea and vomiting and feeling extremely lightheaded and off balance and feeling the room spinning currently feels better with the vertigo cocktail being administered.  The initial head CT was showing hypodensity in the right superior cerebellar territory.  The CT angiogram of the head and neck with contrast was showing lack of contrast in the right superior cerebral artery in the distal and suggestive of possible thrombosis versus vasoconstriction.  An MRI of the brain confirmed acute stroke.  The patient was not a candidate for any acute thrombolytic therapy as the stroke has already been completed at the NIH stroke scale is 0 and also not a candidate for any thrombectomy as there was no evidence of any retrievable thrombus..    Plan:  Eliquis 5 mg twice daily with food  Hydrate  Neuro checks  Check lipid panel, Hgb A1C  Lipitor 80 mg  Non-pharmacological DVT prophylaxis  Echocardiogram has been requested  EKG Tele  PT/OT/ST with stroke protocol.    Stroke Education  Blood pressure control to <130/80  Goal LDL <70-recommend high dose statins-   Serum glucose < 140  NIH stroke scale assessment has been requested as per protocol.  Discussed signs and symptoms of stroke and when to call 911. Instructed to follow a low fat diet including the Mediterranean diet.  Instructed to take all medications daily as prescribed. Encouraged 30 minutes of exercise 3-4 times a week as tolerated. Stay well hydrated. Discussed potential side effects of new medications and signs and symptoms to report. If patient is currently using tobacco products, smoking cessation was encouraged. Reviewed stroke risk factors and stroke prevention plan. Patient verbalizes understanding and agrees with plan.     I have discussed the above with the patient and ER team.  He will be followed up by our inpatient neurology service.  Time spent with patient: 60min    Coding    Dictated using Dragon dictation.

## 2021-07-10 ENCOUNTER — APPOINTMENT (OUTPATIENT)
Dept: CT IMAGING | Facility: HOSPITAL | Age: 61
End: 2021-07-10

## 2021-07-10 ENCOUNTER — APPOINTMENT (OUTPATIENT)
Dept: CARDIOLOGY | Facility: HOSPITAL | Age: 61
End: 2021-07-10

## 2021-07-10 LAB
ANION GAP SERPL CALCULATED.3IONS-SCNC: 5.7 MMOL/L (ref 5–15)
AORTIC ARCH: 2.4 CM
AORTIC DIMENSIONLESS INDEX: 0.8 (DI)
ASCENDING AORTA: 4 CM
BH CV ECHO MEAS - ACS: 1.7 CM
BH CV ECHO MEAS - AO ACC TIME: 0.14 SEC
BH CV ECHO MEAS - AO MAX PG (FULL): 1.2 MMHG
BH CV ECHO MEAS - AO MAX PG: 4.1 MMHG
BH CV ECHO MEAS - AO MEAN PG (FULL): 1 MMHG
BH CV ECHO MEAS - AO MEAN PG: 2 MMHG
BH CV ECHO MEAS - AO ROOT AREA (BSA CORRECTED): 2
BH CV ECHO MEAS - AO ROOT AREA: 8.6 CM^2
BH CV ECHO MEAS - AO ROOT DIAM: 3.3 CM
BH CV ECHO MEAS - AO V2 MAX: 101 CM/SEC
BH CV ECHO MEAS - AO V2 MEAN: 69.3 CM/SEC
BH CV ECHO MEAS - AO V2 VTI: 23 CM
BH CV ECHO MEAS - ASC AORTA: 4 CM
BH CV ECHO MEAS - AVA(I,A): 2.5 CM^2
BH CV ECHO MEAS - AVA(I,D): 2.5 CM^2
BH CV ECHO MEAS - AVA(V,A): 2.6 CM^2
BH CV ECHO MEAS - AVA(V,D): 2.6 CM^2
BH CV ECHO MEAS - BSA(HAYCOCK): 1.7 M^2
BH CV ECHO MEAS - BSA: 1.7 M^2
BH CV ECHO MEAS - BZI_BMI: 19.6 KILOGRAMS/M^2
BH CV ECHO MEAS - BZI_METRIC_HEIGHT: 172 CM
BH CV ECHO MEAS - BZI_METRIC_WEIGHT: 58 KG
BH CV ECHO MEAS - CONTRAST EF (2CH): 55.6 CM2
BH CV ECHO MEAS - CONTRAST EF 4CH: 51.1 CM2
BH CV ECHO MEAS - EDV(CUBED): 64 ML
BH CV ECHO MEAS - EDV(MOD-SP2): 126 ML
BH CV ECHO MEAS - EDV(MOD-SP4): 139 ML
BH CV ECHO MEAS - EDV(TEICH): 70 ML
BH CV ECHO MEAS - EF(CUBED): 61.9 %
BH CV ECHO MEAS - EF(MOD-BP): 49.6 %
BH CV ECHO MEAS - EF(TEICH): 54 %
BH CV ECHO MEAS - ESV(CUBED): 24.4 ML
BH CV ECHO MEAS - ESV(MOD-SP2): 56 ML
BH CV ECHO MEAS - ESV(MOD-SP4): 68 ML
BH CV ECHO MEAS - ESV(TEICH): 32.2 ML
BH CV ECHO MEAS - FS: 27.5 %
BH CV ECHO MEAS - IVS/LVPW: 0.9
BH CV ECHO MEAS - IVSD: 0.9 CM
BH CV ECHO MEAS - LAT PEAK E' VEL: 10.1 CM/SEC
BH CV ECHO MEAS - LV DIASTOLIC VOL/BSA (35-75): 82.5 ML/M^2
BH CV ECHO MEAS - LV MASS(C)D: 118.2 GRAMS
BH CV ECHO MEAS - LV MASS(C)DI: 70.2 GRAMS/M^2
BH CV ECHO MEAS - LV MAX PG: 2.8 MMHG
BH CV ECHO MEAS - LV MEAN PG: 1 MMHG
BH CV ECHO MEAS - LV SYSTOLIC VOL/BSA (12-30): 40.4 ML/M^2
BH CV ECHO MEAS - LV V1 MAX: 84.2 CM/SEC
BH CV ECHO MEAS - LV V1 MEAN: 50.9 CM/SEC
BH CV ECHO MEAS - LV V1 VTI: 18.5 CM
BH CV ECHO MEAS - LVIDD: 4 CM
BH CV ECHO MEAS - LVIDS: 2.9 CM
BH CV ECHO MEAS - LVLD AP2: 8.2 CM
BH CV ECHO MEAS - LVLD AP4: 8.6 CM
BH CV ECHO MEAS - LVLS AP2: 6.4 CM
BH CV ECHO MEAS - LVLS AP4: 7.8 CM
BH CV ECHO MEAS - LVOT AREA (M): 3.1 CM^2
BH CV ECHO MEAS - LVOT AREA: 3.1 CM^2
BH CV ECHO MEAS - LVOT DIAM: 2 CM
BH CV ECHO MEAS - LVPWD: 1 CM
BH CV ECHO MEAS - MED PEAK E' VEL: 7.7 CM/SEC
BH CV ECHO MEAS - MV DEC SLOPE: 304 CM/SEC^2
BH CV ECHO MEAS - MV DEC TIME: 236 SEC
BH CV ECHO MEAS - MV E MAX VEL: 61.7 CM/SEC
BH CV ECHO MEAS - MV MAX PG: 2.1 MMHG
BH CV ECHO MEAS - MV MEAN PG: 1 MMHG
BH CV ECHO MEAS - MV P1/2T MAX VEL: 78.8 CM/SEC
BH CV ECHO MEAS - MV P1/2T: 75.9 MSEC
BH CV ECHO MEAS - MV V2 MAX: 72.7 CM/SEC
BH CV ECHO MEAS - MV V2 MEAN: 39.2 CM/SEC
BH CV ECHO MEAS - MV V2 VTI: 31.1 CM
BH CV ECHO MEAS - MVA P1/2T LCG: 2.8 CM^2
BH CV ECHO MEAS - MVA(P1/2T): 2.9 CM^2
BH CV ECHO MEAS - MVA(VTI): 1.9 CM^2
BH CV ECHO MEAS - PA ACC TIME: 0.15 SEC
BH CV ECHO MEAS - PA MAX PG (FULL): 1.5 MMHG
BH CV ECHO MEAS - PA MAX PG: 2.1 MMHG
BH CV ECHO MEAS - PA PR(ACCEL): 12.4 MMHG
BH CV ECHO MEAS - PA V2 MAX: 72.8 CM/SEC
BH CV ECHO MEAS - PULM A REVS DUR: 0.05 SEC
BH CV ECHO MEAS - PULM A REVS VEL: 20.6 CM/SEC
BH CV ECHO MEAS - PULM DIAS VEL: 29.6 CM/SEC
BH CV ECHO MEAS - PULM S/D: 1.1
BH CV ECHO MEAS - PULM SYS VEL: 33 CM/SEC
BH CV ECHO MEAS - PVA(V,A): 1.7 CM^2
BH CV ECHO MEAS - PVA(V,D): 1.7 CM^2
BH CV ECHO MEAS - QP/QS: 0.53
BH CV ECHO MEAS - RAP SYSTOLE: 3 MMHG
BH CV ECHO MEAS - RV MAX PG: 0.63 MMHG
BH CV ECHO MEAS - RV MEAN PG: 0 MMHG
BH CV ECHO MEAS - RV V1 MAX: 39.8 CM/SEC
BH CV ECHO MEAS - RV V1 MEAN: 26.9 CM/SEC
BH CV ECHO MEAS - RV V1 VTI: 9.8 CM
BH CV ECHO MEAS - RVOT AREA: 3.1 CM^2
BH CV ECHO MEAS - RVOT DIAM: 2 CM
BH CV ECHO MEAS - RVSP: 20.5 MMHG
BH CV ECHO MEAS - SI(AO): 116.8 ML/M^2
BH CV ECHO MEAS - SI(CUBED): 23.5 ML/M^2
BH CV ECHO MEAS - SI(LVOT): 34.5 ML/M^2
BH CV ECHO MEAS - SI(MOD-SP2): 41.5 ML/M^2
BH CV ECHO MEAS - SI(MOD-SP4): 42.1 ML/M^2
BH CV ECHO MEAS - SI(TEICH): 22.4 ML/M^2
BH CV ECHO MEAS - SUP REN AO DIAM: 2 CM
BH CV ECHO MEAS - SV(AO): 196.7 ML
BH CV ECHO MEAS - SV(CUBED): 39.6 ML
BH CV ECHO MEAS - SV(LVOT): 58.1 ML
BH CV ECHO MEAS - SV(MOD-SP2): 70 ML
BH CV ECHO MEAS - SV(MOD-SP4): 71 ML
BH CV ECHO MEAS - SV(RVOT): 30.7 ML
BH CV ECHO MEAS - SV(TEICH): 37.8 ML
BH CV ECHO MEAS - TAPSE (>1.6): 1.8 CM
BH CV ECHO MEAS - TR MAX VEL: 209 CM/SEC
BH CV ECHO MEASUREMENTS AVERAGE E/E' RATIO: 6.93
BH CV VAS BP LEFT ARM: NORMAL MMHG
BH CV XLRA - RV BASE: 3.9 CM
BH CV XLRA - RV LENGTH: 5.8 CM
BH CV XLRA - RV MID: 2.5 CM
BH CV XLRA - TDI S': 11.6 CM/SEC
BUN SERPL-MCNC: 9 MG/DL (ref 6–20)
BUN/CREAT SERPL: 9.1 (ref 7–25)
CALCIUM SPEC-SCNC: 8 MG/DL (ref 8.6–10.5)
CHLORIDE SERPL-SCNC: 111 MMOL/L (ref 98–107)
CHOLEST SERPL-MCNC: 136 MG/DL (ref 0–200)
CO2 SERPL-SCNC: 23.3 MMOL/L (ref 22–29)
CREAT SERPL-MCNC: 0.99 MG/DL (ref 0.76–1.27)
DEPRECATED RDW RBC AUTO: 43.9 FL (ref 37–54)
ERYTHROCYTE [DISTWIDTH] IN BLOOD BY AUTOMATED COUNT: 12.4 % (ref 12.3–15.4)
FOLATE SERPL-MCNC: 9.33 NG/ML (ref 4.78–24.2)
GFR SERPL CREATININE-BSD FRML MDRD: 77 ML/MIN/1.73
GLUCOSE SERPL-MCNC: 90 MG/DL (ref 65–99)
HBA1C MFR BLD: 5.2 % (ref 4.8–5.6)
HCT VFR BLD AUTO: 42.1 % (ref 37.5–51)
HDLC SERPL-MCNC: 35 MG/DL (ref 40–60)
HGB BLD-MCNC: 14.8 G/DL (ref 13–17.7)
LDLC SERPL CALC-MCNC: 86 MG/DL (ref 0–100)
LDLC/HDLC SERPL: 2.45 {RATIO}
LEFT ATRIUM VOLUME INDEX: 19.3 ML/M2
MCH RBC QN AUTO: 33.8 PG (ref 26.6–33)
MCHC RBC AUTO-ENTMCNC: 35.2 G/DL (ref 31.5–35.7)
MCV RBC AUTO: 96.1 FL (ref 79–97)
PLATELET # BLD AUTO: 169 10*3/MM3 (ref 140–450)
PMV BLD AUTO: 11 FL (ref 6–12)
POTASSIUM SERPL-SCNC: 4 MMOL/L (ref 3.5–5.2)
RBC # BLD AUTO: 4.38 10*6/MM3 (ref 4.14–5.8)
SINUS: 3.8 CM
SODIUM SERPL-SCNC: 140 MMOL/L (ref 136–145)
STJ: 3.3 CM
TRIGL SERPL-MCNC: 77 MG/DL (ref 0–150)
TSH SERPL DL<=0.05 MIU/L-ACNC: 2.39 UIU/ML (ref 0.27–4.2)
VIT B12 BLD-MCNC: 339 PG/ML (ref 211–946)
VLDLC SERPL-MCNC: 15 MG/DL (ref 5–40)
WBC # BLD AUTO: 8.69 10*3/MM3 (ref 3.4–10.8)

## 2021-07-10 PROCEDURE — 80048 BASIC METABOLIC PNL TOTAL CA: CPT | Performed by: STUDENT IN AN ORGANIZED HEALTH CARE EDUCATION/TRAINING PROGRAM

## 2021-07-10 PROCEDURE — 93306 TTE W/DOPPLER COMPLETE: CPT | Performed by: INTERNAL MEDICINE

## 2021-07-10 PROCEDURE — 93306 TTE W/DOPPLER COMPLETE: CPT

## 2021-07-10 PROCEDURE — 25010000002 PERFLUTREN (DEFINITY) 8.476 MG IN SODIUM CHLORIDE (PF) 0.9 % 10 ML INJECTION: Performed by: PSYCHIATRY & NEUROLOGY

## 2021-07-10 PROCEDURE — 82746 ASSAY OF FOLIC ACID SERUM: CPT | Performed by: PSYCHIATRY & NEUROLOGY

## 2021-07-10 PROCEDURE — 84443 ASSAY THYROID STIM HORMONE: CPT | Performed by: PSYCHIATRY & NEUROLOGY

## 2021-07-10 PROCEDURE — 80061 LIPID PANEL: CPT | Performed by: PSYCHIATRY & NEUROLOGY

## 2021-07-10 PROCEDURE — 83036 HEMOGLOBIN GLYCOSYLATED A1C: CPT | Performed by: PSYCHIATRY & NEUROLOGY

## 2021-07-10 PROCEDURE — 82607 VITAMIN B-12: CPT | Performed by: PSYCHIATRY & NEUROLOGY

## 2021-07-10 PROCEDURE — 99233 SBSQ HOSP IP/OBS HIGH 50: CPT | Performed by: NURSE PRACTITIONER

## 2021-07-10 PROCEDURE — 85027 COMPLETE CBC AUTOMATED: CPT | Performed by: STUDENT IN AN ORGANIZED HEALTH CARE EDUCATION/TRAINING PROGRAM

## 2021-07-10 PROCEDURE — 70450 CT HEAD/BRAIN W/O DYE: CPT

## 2021-07-10 PROCEDURE — 99254 IP/OBS CNSLTJ NEW/EST MOD 60: CPT | Performed by: INTERNAL MEDICINE

## 2021-07-10 RX ORDER — ATORVASTATIN CALCIUM 20 MG/1
40 TABLET, FILM COATED ORAL NIGHTLY
Status: DISCONTINUED | OUTPATIENT
Start: 2021-07-10 | End: 2021-07-11 | Stop reason: HOSPADM

## 2021-07-10 RX ADMIN — PERFLUTREN 2 ML: 6.52 INJECTION, SUSPENSION INTRAVENOUS at 08:19

## 2021-07-10 RX ADMIN — SODIUM CHLORIDE 100 ML/HR: 9 INJECTION, SOLUTION INTRAVENOUS at 06:13

## 2021-07-10 RX ADMIN — APIXABAN 5 MG: 5 TABLET, FILM COATED ORAL at 20:44

## 2021-07-10 RX ADMIN — CLOPIDOGREL 75 MG: 75 TABLET, FILM COATED ORAL at 10:14

## 2021-07-10 RX ADMIN — APIXABAN 5 MG: 5 TABLET, FILM COATED ORAL at 10:14

## 2021-07-10 RX ADMIN — SODIUM CHLORIDE 100 ML/HR: 9 INJECTION, SOLUTION INTRAVENOUS at 16:37

## 2021-07-10 RX ADMIN — ATORVASTATIN CALCIUM 40 MG: 20 TABLET, FILM COATED ORAL at 20:44

## 2021-07-10 NOTE — PROGRESS NOTES
"DOS: 7/10/2021  NAME: Damien Peña   : 1960  PCP: Provider, No Known  Chief Complaint   Patient presents with   • Vomiting   • Nausea          Stroke    Subjective: No events overnight.  Patient reports that he continues to have feeling of imbalance when changing positions and/or moving head rapidly left or right.  Blood pressure somewhat low; BP meds not yet resumed due to this.  Will resume mobility gradually to see if patient tolerates; make sure taking adequate p.o. recommend continuing IV fluids for now.    No family at bedside      Objective:  Vital signs: /72   Pulse 52   Temp 98.3 °F (36.8 °C) (Oral)   Resp 18   Ht 172 cm (67.72\")   Wt 58 kg (127 lb 13.9 oz)   SpO2 99%   BMI 19.60 kg/m²       HEENT: Normocephalic, atraumatic   COR: RRR  Resp: Even and unlabored  Extremities: Equal pulses, nondistal embolization    Neurological:   MS: AO. Language normal. No neglect. Higher integrative function normal  CN: II-XII normal except intermittent changes in vision which are not seen on exam again today  Motor: 5/5, normal tone  Reflexes: Toes downgoing  Sensory: Intact-to light touch  Coordination: Normal-finger-to-nose    Laboratory results:  Lab Results   Component Value Date    GLUCOSE 90 07/10/2021    CALCIUM 8.0 (L) 07/10/2021     07/10/2021    K 4.0 07/10/2021    CO2 23.3 07/10/2021     (H) 07/10/2021    BUN 9 07/10/2021    CREATININE 0.99 07/10/2021    EGFRIFNONA 77 07/10/2021    BCR 9.1 07/10/2021    ANIONGAP 5.7 07/10/2021     Lab Results   Component Value Date    WBC 8.69 07/10/2021    HGB 14.8 07/10/2021    HCT 42.1 07/10/2021    MCV 96.1 07/10/2021     07/10/2021     Lab Results   Component Value Date    CHOL 136 07/10/2021     Lab Results   Component Value Date    HDL 35 (L) 07/10/2021     Lab Results   Component Value Date    LDL 86 07/10/2021     Lab Results   Component Value Date    TRIG 77 07/10/2021      Lab Results   Component Value Date    HGBA1C 5.20 " 07/10/2021           Lab 07/10/21  0616   HEMOGLOBIN A1C 5.20                Review and interpretation of imaging:  MRI OF THE BRAIN WITH AND WITHOUT CONTRAST 07/09/2021     CLINICAL HISTORY: Patient complains of dizziness and nausea.     TECHNIQUE: Axial T1, FLAIR, fat-suppressed T2, axial diffusion and  gradient echo T2, sagittal T1 and postcontrast axial fat-suppressed T1  and coronal T1-weighted images were obtained of the entire head. In  addition, thin cut axial T2 and thin cut pre and postcontrast axial and  coronal T1-weighted images were obtained through the internal auditory  canals.     COMPARISON: This is correlated to yesterday's noncontrast head CT  07/08/2021.     FINDINGS: There is a focal area of intense increased signal intensity on  the diffusion-weighted images and high signal on the FLAIR and  T2-weighted images, low signal on the T1-weighted images, and marked  hypointensity on ADC maps indicating true restricted diffusion and  cytotoxic edema extending through the superior medial right cerebellum  that measures approximately 2.2 x 2.4 x 2.3 cm in anterior posterior,  medial lateral and craniocaudal dimension, is consistent with an acute  infarct in the distribution of the superior cerebellar branches of the  right superior cerebellar artery territory. The remainder of the brain  parenchyma is normal in signal intensity. The ventricles are normal in  size. I see no focal mass effect and no midline shift and no extra-axial  fluid collections are identified. No abnormal areas of enhancement are  seen in the head. The paranasal sinuses are clear. There is a tiny  amount of fluid in the inferior tip of the right mastoid air cells,  partial opacification inferior left mastoid air cells with fluid. There  is some T2 hyperintensity within the post PICA segment of the small size  right vertebral artery and the post PICA segment is tiny in diameter. On  yesterday's CT angiogram of the head and neck that  appeared to be  patent, and it probably has slow flow due to its small size. Otherwise,  good flow voids are demonstrated within the cerebral vessels and in the  dural venous sinuses.     Thin cut images through the internal auditory canals demonstrate no  abnormal enhancement within the cerebellopontine angles or within the  internal auditory canals or within the substance of the temporal bones.     IMPRESSION:  1. Small amount of fluid in the inferior mastoid air cells bilaterally.  2. There is a 2.2 x 2.4 x 2.3 cm acute infarct in the superior medial  right cerebellum within the right superior cerebellar artery territory.  There is some T2 high signal within the post PICA segment of the small  size right vertebral artery, and the post PICA segment appeared patent  on yesterday's CT angiogram. It likely has slow or turbulent flow due to  its small size. The remainder of the MRI of the brain is normal. Thin  cut images through the internal auditory canals are also normal. The  results were communicated to Keyur Becerra MD, the stroke  neurologist taking care of the patient by telephone on 07/09/2021 at  9:00 AM.     This report was finalized on 7/9/2021 11:56 AM by Dr. Curry Barrett M.D.     PROCEDURE: CT ANGIOGRAM HEAD W AI ANALYSIS OF LVO-, CT ANGIOGRAM NECK-     HISTORY:  Cerebellar infarcts, nausea, vomiting, dizziness.     TECHNIQUE: CT images of the brain were obtained without intravenous  contrast. Following the administration of intravenous contrast, CT  images of the head and neck were obtained. Reformatted and 3-D images  were reviewed. Radiation dose reduction techniques were utilized,  including automated exposure control and exposure modulation based on  body size. AI analysis of LVO was utilized.     COMPARISON:  CT head without contrast 07/08/2021 at 8:38 PM.     FINDINGS:     Non Contrast CT Brain:     The ventricles and sulci are within normal limits.  There is no  hydrocephalus.  No acute  intracranial hemorrhage or pathologic  extra-axial collection is identified.  There is no midline shift or mass  effect.  The basal cisterns are patent. A few scattered foci of  subcortical and periventricular hypoattenuation are nonspecific but  favored to reflect mild small vessel ischemic disease. There is  redemonstration of a focal area of hypoattenuation involving the cortex  and subcortical superior right cerebellar hemisphere, measuring  approximately 2.3 x 1.6 cm, which is suspicious for possible acute  subacute infarct. There is calcific intracranial atherosclerosis. There  are trace bilateral mastoid effusions.     CT Angiography Head:     The visible internal carotid arteries, middle and anterior cerebral  arteries demonstrate normal contrast-related enhancement.  The anterior  communicating artery complex has a normal appearance. Bilateral  posterior communicating arteries are identified, right larger than left.     The left vertebral artery is dominant. The right V4 segment is diffusely  diminutive, likely developmental. There is a diminutive right P1  segment, which may be developmental. The vertebrobasilar circulation and  both posterior cerebral arteries demonstrate normal contrast-related  enhancement. The superior cerebellar arteries are duplicated  bilaterally. The right superior cerebellar arteries are asymmetrically  diminutive and poorly opacified distally. Bilateral PICA origins are  identified.     No aneurysm or other vascular malformation is identified.       CT Angiography Neck:     There is a 3 vessel aortic arch.  The origins of the vessels arising  from the arch are patent.  There is trace calcific atherosclerosis at  the bifurcation of the brachiocephalic artery.     The common carotid arteries have a normal caliber.  The carotid bulbs  are within normal limits.  The bilateral internal carotid arteries  demonstrate no evidence of flow-limiting stenosis or occlusion. NASCET  criteria  was utilized. The distal cervical left internal carotid artery  is slightly tortuous.     The vertebral arteries arise from the subclavian arteries.  The left  vertebral artery is dominant.  There is no evidence of flow-limiting  stenosis or occlusion of the vertebral arteries.     There are calcified bilateral palatine tonsilloliths. There is  centrilobular and paraseptal emphysema in the lung apices. There is mild  biapical pleuroparenchymal fibrosis, right and left. There are calcified  biapical pleural plaques, left greater than right. There is multilevel  degenerative disc disease. There is multifocal dental disease consisting  of dental caries and periapical lucencies, incompletely assessed.        IMPRESSION:     1.  Redemonstration of a small area of hypoattenuation in the superior  right cerebellar hemisphere, suspicious for an acute to subacute  infarct. This would be better assessed with MRI. The right superior  cerebellar artery is asymmetrically diminutive and poorly opacified  distally and may be occluded.  2.  Background mild small vessel ischemic disease.  3.  No acute intracranial hemorrhage.     Discussed with Rosemary, the patient's nurse, at 12:50 AM.     This report was finalized on 7/9/2021 12:49 AM by Dr. Kerrie Etienne M.D.  EMERGENCY NONCONTRAST HEAD CT 07/08/2021     CLINICAL HISTORY: Vertigo that is not improving.     TECHNIQUE: Spiral CT images were obtained from the base of the skull to  the vertex without intravenous contrast. Images were re-formatted and  submitted in 3 mm thick axial CT sections with brain algorithm and 2 mm  thick sagittal and coronal reconstructions were performed and submitted  in brain algorithm.     COMPARISON: There are no prior studies from [facility] for comparison.     FINDINGS: On coronal reconstructed images 62 through 65 there is, and  also seen on axial images 18 through 20, is a 1.7 x 1.5 cm area of low  density in the superior medial  right cerebellum. This is nonspecific and  could be artifact, potentially it could be a subtle acute infarct.  Otherwise the brain parenchyma is normal in attenuation. The ventricles  are normal in size. I see no mass effect, no midline shift, no  extra-axial fluid collections are identified and there is no evidence of  acute intracranial hemorrhage. There is partial opacification of the  inferior left mastoid air cells with fluid; otherwise, the paranasal  sinuses, mastoid air cells and middle ear cavities are clear. Calvarium  and skull base are normal in appearance.     IMPRESSION:  1. Small amount of fluid partially opacifies the inferior left mastoid  air cells.   2. There is a subtle area of low-density extending through the superior  medial right cerebellum that involves the white matter and extends  through the superior medial right cerebellar cortex. It measures 17 x 15  x 15 mm in size. There is a nonspecific finding potentially it could be  artifact, potentially it could be an subtle to acute superior medial  right cerebellar infarct in the right superior cerebellar artery  territory and I recommended an MRI of the brain to further evaluate. The  remainder of the head CT is normal.      My findings and recommendation for an MRI of the brain to further  evaluate the superior right cerebellum were communicated to TRUPTI Jones by telephone on 07/08/2021 at 8:55 PM     Radiation dose reduction techniques were utilized, including automated  exposure control and exposure modulation based on body size.     This report was finalized on 7/9/2021 6:33 AM by Dr. Curry Barrett M.D.       Impression: This is a 60-year-old male with history of apical thrombus (supposed to be on Eliquis and plan prior to arrival although reports noncompliance), hypertension, sinus bradycardia, CAD, tobacco abuse/current smoker, and CKD who presented to [facility] 7/8 due to complaint of room spinning vertigo with associated  vomiting.  Initial CT of the head showed subtle area of low density in right cerebellum thought to reflect acute infarct.  No mass no hemorrhage.  CTA of the head and neck again redemonstrated area of hypoattenuation in the superior right cerebellar hemisphere, evidence of mild small vessel ischemic disease, no evidence of dissection/aneurysm and/or occlusion noted.MRI of the brain since confirmed right cerebellar infarct.      Neurologically, intact with some vertigo with lateral eye movements and quick body movements.  TTE shows EF 46 to 50%.  LV normal.  Small flat solid thrombus noted left ventricle apex; thrombus is fixed.  Patient currently on full dose Eliquis-cardiology consult pending.  Since admission, patient reported noncompliance of medications.  Other recommendations below.PT/OT/ST. CCP to assist with discharge planning. Call RRT for any acute neurological changes and/or concerns. We will continue to follow and advise.       Plan:  Cardiology consult-pending  Eliquis 5 mg twice daily-reported medication noncompliance prior to arrival was supposed to be on full dose Eliquis and Plavix 75 mg daily along with lisinopril and metoprolol  Decrease Lipitor 40 mg daily; LDL 80  [facility]  Stroke Education  LINDA/SCDs  PT/OT/ST      Case reviewed with attending neurologist  07/10 and he agrees with treatment plan above.    TRUPTI White

## 2021-07-10 NOTE — SIGNIFICANT NOTE
07/10/21 0835   OTHER   Discipline physical therapist   Rehab Time/Intention   Session Not Performed   (Bed rest order noted and pt off the floor. Will follow up tomorrow)   Recommendation   PT - Next Appointment 07/11/21

## 2021-07-10 NOTE — PLAN OF CARE
Patient's condition is stable. Laying in bed watching TV and playing games on phone. States is not having any discomfort at this time. Alert and oriented x4; cooperative. VSS. PPP without edema. Blood pressure is ranging from the 's systolic and bradycardic on the monitor, patient states that this is normal.  Problem: Adult Inpatient Plan of Care  Goal: Plan of Care Review  Outcome: Ongoing, Progressing  Flowsheets  Taken 7/10/2021 1752 by Dilma Mathew, RN  Plan of Care Reviewed With: patient  Taken 7/10/2021 0448 by Amairani Davalos RN  Progress: no change    Will continue to monitor for changes in condition.

## 2021-07-10 NOTE — SIGNIFICANT NOTE
07/10/21 1222   OTHER   Discipline occupational therapist   Rehab Time/Intention   Session Not Performed other (see comments)  (OT spoke with RN, active bedrest orders, waiting on clarification. OT to f/u at later date)   Recommendation   OT - Next Appointment 07/12/21

## 2021-07-10 NOTE — PROGRESS NOTES
Name: Damien Peña ADMIT: 2021   : 1960  PCP: Provider, No Known    MRN: [id number] LOS: 1 days   AGE/SEX: 60 y.o. male  ROOM: [facility]     Subjective   Subjective   No events overnight. Pt is doing well. No complaints. He asks about rehab         Objective   Objective   Vital Signs  Temp:  [97.8 °F (36.6 °C)-98.3 °F (36.8 °C)] 98.3 °F (36.8 °C)  Heart Rate:  [52-64] 52  Resp:  [16-18] 18  BP: ()/(62-72) 100/72  SpO2:  [95 %-99 %] 99 %  on   ;   Device (Oxygen Therapy): room air  Body mass index is 19.6 kg/m².  Physical Exam  Constitutional:       General: He is not in acute distress.  Cardiovascular:      Rate and Rhythm: Normal rate and regular rhythm.      Heart sounds: Normal heart sounds.   Pulmonary:      Effort: Pulmonary effort is normal.      Breath sounds: Normal breath sounds.   Abdominal:      General: Bowel sounds are normal.      Palpations: Abdomen is soft.   Musculoskeletal:         General: No tenderness.      Right lower leg: No edema.      Left lower leg: No edema.   Neurological:      Mental Status: He is alert.   Psychiatric:         Mood and Affect: Mood normal.         Behavior: Behavior normal.         Results Review     I reviewed the patient's new clinical results.  Results from last 7 days   Lab Units 07/10/21  0616 21  1710   WBC 10*3/mm3 8.69 8.38   HEMOGLOBIN g/dL 14.8 16.3   PLATELETS 10*3/mm3 169 189     Results from last 7 days   Lab Units 07/10/21  0616 21  1710   SODIUM mmol/L 140 138   POTASSIUM mmol/L 4.0 3.4*   CHLORIDE mmol/L 111* 106   CO2 mmol/L 23.3 24.5   BUN mg/dL 9 11   CREATININE mg/dL 0.99 1.06   GLUCOSE mg/dL 90 148*   Estimated Creatinine Clearance: 65.9 mL/min (by C-G formula based on SCr of 0.99 mg/dL).  Results from last 7 days   Lab Units 21  1710   ALBUMIN g/dL 4.00   BILIRUBIN mg/dL 0.5   ALK PHOS U/L 53   AST (SGOT) U/L 13   ALT (SGPT) U/L 12     Results from last 7 days   Lab Units 07/10/21  0616 21  3988    CALCIUM mg/dL 8.0* 8.4*   ALBUMIN g/dL  --  4.00       COVID19   Date Value Ref Range Status   07/08/2021 Not Detected Not Detected - Ref. Range Final     Hemoglobin A1C   Date/Time Value Ref Range Status   07/10/2021 0616 5.20 4.80 - 5.60 % Final     Glucose   Date/Time Value Ref Range Status   07/09/2021 1622 97 70 - 130 mg/dL Final     Comment:     Meter: [id number] : [id number] Leonel PACHECO   07/09/2021 1254 101 70 - 130 mg/dL Final     Comment:     Meter: [id number] : [id number] Arnav PACHECO       Adult Transthoracic Echo Complete W/ Cont if Necessary Per Protocol  · Left ventricular ejection fraction appears to be 46 - 50%. Left   ventricular systolic function is mildly decreased. Normal left ventricular   cavity size and wall thickness noted. Left ventricular diastolic function   was normal.  · There is a small, flat, solid thrombus located in the left ventricular   apex. The thrombus is fixed.  · Ascending aorta = 4.0 cm The inferior vena cava is normally sized.     CT Head Without Contrast  CT SCAN OF THE BRAIN WITHOUT CONTRAST     HISTORY: Acute right cerebellar infarct.     FINDINGS: The CT scan was performed through the brain without contrast  and compared to the MRI scan performed yesterday and CT scan performed 2  days ago. There is further evolutionary change with decreased density  involving the acute right cerebellar infarct when compared to 2 days  ago. The involved area measures approximately 1.8 x 2.8 cm and there is  no evidence of hemorrhagic transformation. There is no significant mass  effect. The remainder of the CT scan is unremarkable.                 Radiation dose reduction techniques were utilized, including automated  exposure control and exposure modulation based on body size.     This report was finalized on 7/10/2021 11:03 AM by Dr. Hayden Fowler M.D.       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       60 y.o. male admitted with 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 (CMS/HCC)    Acute right cerebellar infarct-echocardiogram does show LV solid and fixed thrombus in apex. Repeat head CT without hemorrhage. Pt is on eliquis and high dose statin.    CAD-on plavix and statin. No bb due to bradycardia. Follow up cardiology recommendations.  Essential hypertension-bp soft without meds  CKD3-creatinine better than baseline  Tobacco use  History of left renal artery thrombus  History of left apical thrombus    · Eliquis (home med) for DVT prophylaxis.  · Full code.  · Discussed with patient.  · Anticipate discharge depending on PT/OT evaluation. Bedrest orders discontinued.      Slick Romero MD  [facility]  07/10/21  12:58 EDT    I wore protective equipment throughout this patient encounter including a face mask, gloves and protective eyewear.  Hand hygiene was performed before donning protective equipment and after removal when leaving the room.

## 2021-07-10 NOTE — PLAN OF CARE
Goal Outcome Evaluation:  Plan of Care Reviewed With: patient        Progress: no change  Outcome Summary: VSS. NIH 1. IVF continues. Echo to be completed. No new complaints overnight, patient slept most of the night. Labs for the morning.      Problem: Fall Injury Risk  Goal: Absence of Fall and Fall-Related Injury  Outcome: Ongoing, Progressing  Intervention: Identify and Manage Contributors to Fall Injury Risk  Recent Flowsheet Documentation  Taken 7/10/2021 0230 by Amairani Davalos RN  Medication Review/Management: medications reviewed  Taken 7/9/2021 2020 by Amairani Davalos RN  Medication Review/Management: medications reviewed  Intervention: Promote Injury-Free Environment  Recent Flowsheet Documentation  Taken 7/10/2021 0417 by Amairani Davalos RN  Safety Promotion/Fall Prevention:   activity supervised   assistive device/personal items within reach   clutter free environment maintained   fall prevention program maintained   nonskid shoes/slippers when out of bed   room organization consistent   safety round/check completed  Taken 7/10/2021 0230 by Amairani Davalos RN  Safety Promotion/Fall Prevention:   activity supervised   assistive device/personal items within reach   clutter free environment maintained   fall prevention program maintained   nonskid shoes/slippers when out of bed   room organization consistent   safety round/check completed  Taken 7/10/2021 0040 by Amairani Davalos RN  Safety Promotion/Fall Prevention:   activity supervised   assistive device/personal items within reach   clutter free environment maintained   fall prevention program maintained   nonskid shoes/slippers when out of bed   room organization consistent   safety round/check completed  Taken 7/9/2021 2208 by Amairani Davalos RN  Safety Promotion/Fall Prevention:   activity supervised   assistive device/personal items within reach   clutter free environment maintained   fall prevention program maintained   nonskid shoes/slippers when out of bed   room  organization consistent   safety round/check completed  Taken 7/9/2021 2020 by Amairani Davalos RN  Safety Promotion/Fall Prevention:   activity supervised   assistive device/personal items within reach   clutter free environment maintained   fall prevention program maintained   nonskid shoes/slippers when out of bed   room organization consistent   safety round/check completed     Problem: Arrhythmia/Dysrhythmia  Goal: Normalized Cardiac Rhythm  Outcome: Ongoing, Progressing  Intervention: Monitor and Manage Cardiac Rhythm Effects  Recent Flowsheet Documentation  Taken 7/9/2021 2020 by Amairani Davalos RN  VTE Prevention/Management:   bilateral   dorsiflexion/plantar flexion performed   sequential compression devices off   patient refused intervention     Problem: Adjustment to Illness (Stroke, Ischemic/Transient Ischemic Attack)  Goal: Optimal Coping  Outcome: Ongoing, Progressing  Intervention: Support Patient/Family Psychosocial Response to Stroke  Recent Flowsheet Documentation  Taken 7/10/2021 0040 by Amairani Davalos RN  Family/Support System Care: self-care encouraged  Taken 7/9/2021 2020 by Amairani Davalos RN  Family/Support System Care: self-care encouraged     Problem: Cerebral Tissue Perfusion Risk (Stroke, Ischemic/Transient Ischemic Attack)  Goal: Optimal Cerebral Tissue Perfusion  Outcome: Ongoing, Progressing  Intervention: Optimize Oxygenation and Ventilation  Recent Flowsheet Documentation  Taken 7/10/2021 0417 by Amairani Davalos RN  Head of Bed (HOB): HOB at 20-30 degrees  Taken 7/10/2021 0230 by Amairani Davalos RN  Head of Bed (HOB): HOB at 20-30 degrees  Taken 7/10/2021 0040 by Amairani Davalos RN  Head of Bed (HOB): HOB at 20-30 degrees  Taken 7/9/2021 2208 by Amairani Davalos RN  Head of Bed (HOB): HOB at 20-30 degrees  Taken 7/9/2021 2020 by Amairani Davalos RN  Head of Bed (HOB): HOB at 30-45 degrees     Problem: Communication Impairment (Stroke, Ischemic/Transient Ischemic Attack)  Goal: Improved Communication  Skills  Outcome: Ongoing, Progressing     Problem: Functional Ability Impaired (Stroke, Ischemic/Transient Ischemic Attack)  Goal: Optimal Functional Ability  Outcome: Ongoing, Progressing  Intervention: Optimize Functional Ability  Recent Flowsheet Documentation  Taken 7/10/2021 0040 by Amairani Davalos RN  Activity Management: activity adjusted per tolerance  Taken 7/9/2021 2020 by Amairani Davalos RN  Activity Management: activity adjusted per tolerance

## 2021-07-11 ENCOUNTER — HOME HEALTH ADMISSION (OUTPATIENT)
Dept: HOME HEALTH SERVICES | Facility: HOME HEALTHCARE | Age: 61
End: 2021-07-11

## 2021-07-11 VITALS
BODY MASS INDEX: 19.38 KG/M2 | RESPIRATION RATE: 14 BRPM | WEIGHT: 127.87 LBS | TEMPERATURE: 97.8 F | HEART RATE: 62 BPM | HEIGHT: 68 IN | OXYGEN SATURATION: 96 % | DIASTOLIC BLOOD PRESSURE: 84 MMHG | SYSTOLIC BLOOD PRESSURE: 128 MMHG

## 2021-07-11 PROBLEM — I51.3 LEFT VENTRICULAR APICAL THROMBUS: Status: ACTIVE | Noted: 2021-07-11

## 2021-07-11 PROBLEM — I63.441 CEREBRAL INFARCTION DUE TO EMBOLISM OF RIGHT CEREBELLAR ARTERY: Status: RESOLVED | Noted: 2021-07-09 | Resolved: 2021-07-11

## 2021-07-11 PROCEDURE — 97161 PT EVAL LOW COMPLEX 20 MIN: CPT

## 2021-07-11 PROCEDURE — 99233 SBSQ HOSP IP/OBS HIGH 50: CPT | Performed by: NURSE PRACTITIONER

## 2021-07-11 PROCEDURE — 97110 THERAPEUTIC EXERCISES: CPT

## 2021-07-11 RX ORDER — ATORVASTATIN CALCIUM 40 MG/1
40 TABLET, FILM COATED ORAL NIGHTLY
Qty: 30 TABLET | Refills: 0 | Status: SHIPPED | OUTPATIENT
Start: 2021-07-11 | End: 2021-07-28 | Stop reason: SDUPTHER

## 2021-07-11 RX ADMIN — APIXABAN 5 MG: 5 TABLET, FILM COATED ORAL at 08:48

## 2021-07-11 RX ADMIN — SODIUM CHLORIDE 100 ML/HR: 9 INJECTION, SOLUTION INTRAVENOUS at 01:58

## 2021-07-11 RX ADMIN — CLOPIDOGREL 75 MG: 75 TABLET, FILM COATED ORAL at 08:48

## 2021-07-11 NOTE — PLAN OF CARE
PT follow up:    Per request, PT followed up to complete stairs. Nursing reports pt is unable to receive HH PT due to lack of PCP. Family expressed concerns with pt completing stairs to apartment, pt living alone, and not being able to get to his outpatient appointments. Pt ambulated 300+ ft. With RWX and CGA. Pt performed stairs with railing on the right (ascending), ascending and descending reciprocally with 1 handrail and CGA. Pt needs RWX prior to D/C home. Recommend pt home with assist for at least 5 days for safety (pt reports family can stay with him). Unknown if pt is capable/cleared to drive upon discharge, which may impact his return home alone and make appointments. Pt may benefit from rehab/SNF to improve balance and strength if unable to drive and unable to receive HH.

## 2021-07-11 NOTE — DISCHARGE INSTR - ACTIVITY
No driving until follow-up with Cathi Zapien in 1 month.     Due to nature of work, unable to return to work until driving clearance obtained by Cathi Zapien.

## 2021-07-11 NOTE — PLAN OF CARE
Goal Outcome Evaluation:  Plan of Care Reviewed With: patient        Progress: no change  Outcome Summary: VSS. NIH 0. No new complaints overnight. IVF continues. PT to work with patient today. Labs for the morning.      Problem: Fall Injury Risk  Goal: Absence of Fall and Fall-Related Injury  Outcome: Ongoing, Progressing  Intervention: Identify and Manage Contributors to Fall Injury Risk  Recent Flowsheet Documentation  Taken 7/11/2021 0159 by Amairani Davalos RN  Medication Review/Management: medications reviewed  Taken 7/11/2021 0024 by Amairani Davalos RN  Medication Review/Management: medications reviewed  Taken 7/10/2021 2047 by Amairani Davalos RN  Medication Review/Management: medications reviewed  Intervention: Promote Injury-Free Environment  Recent Flowsheet Documentation  Taken 7/11/2021 0445 by Amairani Davalos RN  Safety Promotion/Fall Prevention:   activity supervised   assistive device/personal items within reach   clutter free environment maintained   fall prevention program maintained   nonskid shoes/slippers when out of bed   room organization consistent   safety round/check completed  Taken 7/11/2021 0159 by Amairani Davalos RN  Safety Promotion/Fall Prevention:   activity supervised   assistive device/personal items within reach   clutter free environment maintained   fall prevention program maintained   nonskid shoes/slippers when out of bed   room organization consistent   safety round/check completed  Taken 7/11/2021 0024 by Amairani Davalos RN  Safety Promotion/Fall Prevention:   activity supervised   assistive device/personal items within reach   clutter free environment maintained   fall prevention program maintained   nonskid shoes/slippers when out of bed   room organization consistent   safety round/check completed  Taken 7/10/2021 2212 by Amairani Davalos RN  Safety Promotion/Fall Prevention:   activity supervised   assistive device/personal items within reach   clutter free environment maintained   fall  prevention program maintained   nonskid shoes/slippers when out of bed   room organization consistent   safety round/check completed  Taken 7/10/2021 2047 by Amairani Davalos RN  Safety Promotion/Fall Prevention:   activity supervised   assistive device/personal items within reach   clutter free environment maintained   fall prevention program maintained   nonskid shoes/slippers when out of bed   room organization consistent   safety round/check completed     Problem: Arrhythmia/Dysrhythmia  Goal: Normalized Cardiac Rhythm  Outcome: Ongoing, Progressing  Intervention: Monitor and Manage Cardiac Rhythm Effects  Recent Flowsheet Documentation  Taken 7/11/2021 0024 by Amairani Davalos RN  VTE Prevention/Management:   bilateral   dorsiflexion/plantar flexion performed   sequential compression devices off   patient refused intervention  Taken 7/10/2021 2047 by Amairani Davalos RN  VTE Prevention/Management:   bilateral   dorsiflexion/plantar flexion performed   sequential compression devices off   patient refused intervention     Problem: Heart Failure Comorbidity  Goal: Maintenance of Heart Failure Symptom Control  Outcome: Ongoing, Progressing  Intervention: Maintain Heart Failure-Management Strategies  Recent Flowsheet Documentation  Taken 7/11/2021 0159 by Amairani Davalos RN  Medication Review/Management: medications reviewed  Taken 7/11/2021 0024 by Amairani Davalos RN  Medication Review/Management: medications reviewed  Taken 7/10/2021 2047 by Amairani Davalos RN  Medication Review/Management: medications reviewed     Problem: Adjustment to Illness (Stroke, Ischemic/Transient Ischemic Attack)  Goal: Optimal Coping  Outcome: Ongoing, Progressing  Intervention: Support Patient/Family Psychosocial Response to Stroke  Recent Flowsheet Documentation  Taken 7/11/2021 0024 by Amairani Davalos RN  Family/Support System Care: self-care encouraged  Taken 7/10/2021 2047 by Amairani Davalos RN  Family/Support System Care: self-care encouraged     Problem:  Functional Ability Impaired (Stroke, Ischemic/Transient Ischemic Attack)  Goal: Optimal Functional Ability  Outcome: Ongoing, Progressing  Intervention: Optimize Functional Ability  Recent Flowsheet Documentation  Taken 7/11/2021 0159 by Amairani Davalos RN  Activity Management: activity adjusted per tolerance  Taken 7/11/2021 0024 by Amairani Davalos, RN  Activity Management: activity adjusted per tolerance  Taken 7/10/2021 2047 by Amairani Davalos, RN  Activity Management: activity adjusted per tolerance

## 2021-07-11 NOTE — THERAPY EVALUATION
Patient Name: Damien Peña  : 1960    MRN: [id number]                              Today's Date: 2021       Admit Date: 2021    Visit Dx:     ICD-10-CM ICD-9-CM   1. Non-intractable vomiting with nausea, unspecified vomiting type  R11.2 787.01   2. Vertigo  R42 780.4     Patient Active Problem List   Diagnosis   • Kidney infarction (CMS/HCC)   • Coronary artery disease involving native coronary artery of native heart without angina pectoris   • Tobacco abuse   • CKD (chronic kidney disease)   • Bradycardia, sinus   • Vertigo   • Essential hypertension   • Left ventricular apical thrombus     Past Medical History:   Diagnosis Date   • Bradycardia    • CAD (coronary artery disease)    • Chronic kidney disease    • Tobacco abuse      Past Surgical History:   Procedure Laterality Date   • APPENDECTOMY     • CARDIAC CATHETERIZATION     • COLONOSCOPY     • CORONARY STENT PLACEMENT     • ECHO - CONVERTED       General Information     [facility] Name 21 1050          Physical Therapy Time and Intention    Document Type  evaluation;therapy note (daily note)  -     Mode of Treatment  physical therapy;individual therapy  -     Row Name 21 1050          General Information    Patient Profile Reviewed  yes  -     Prior Level of Function  independent:;all household mobility;community mobility;gait;transfer;bed mobility pt works at [employer]  -     Existing Precautions/Restrictions  fall  -     Barriers to Rehab  none identified  -     Row Name 21 1050          Home Main Entrance    Number of Stairs, Main Entrance  (S) -- 2 flights, apartment  -     Stair Railings, Main Entrance  railings on both sides of stairs  -     Row Name 21 1050          Stairs Within Home, Primary    Number of Stairs, Within Home, Primary  none  -     Row Name 21 1050          Cognition    Orientation Status (Cognition)  oriented x 4  -     Row Name 21 1050          Safety Issues,  Functional Mobility    Impairments Affecting Function (Mobility)  balance;endurance/activity tolerance;strength  -     Comment, Safety Issues/Impairments (Mobility)  fall prevention program maintained  -       User Key  (r) = Recorded By, (t) = Taken By, (c) = Cosigned By    Initials Name Provider Type    Briana Frias, PT Physical Therapist        Mobility     Row Name 07/11/21 1051          Bed Mobility    Bed Mobility  supine-sit;sit-supine  -     Supine-Sit [place] (Bed Mobility)  modified independence  -     Sit-Supine [place] (Bed Mobility)  not tested up in chair  -     Assistive Device (Bed Mobility)  bed rails;head of bed elevated  -     Row Name 07/11/21 1051          Sit-Stand Transfer    Sit-Stand [place] (Transfers)  contact guard  -     Assistive Device (Sit-Stand Transfers)  -- none  -     Row Name 07/11/21 1051          Gait/Stairs (Locomotion)    [place] Level (Gait)  contact guard  -     Assistive Device (Gait)  -- pushing IV pole  -     Distance in Feet (Gait)  160 ft.  -     Deviations/Abnormal Patterns (Gait)  base of support, wide  -     Bilateral Gait Deviations  forward flexed posture  -     Comment (Gait/Stairs)  Pt slightly unsteady, reaching for furniture but able to ambulate pushing IV pole with CGA. Pt would benefit from RWX at this time  -       User Key  (r) = Recorded By, (t) = Taken By, (c) = Cosigned By    Initials Name Provider Type    Briana Frias, PT Physical Therapist        Obj/Interventions     Row Name 07/11/21 1053          Range of Motion Comprehensive    Comment, General Range of Motion  BUE and BLE gross ROM appear WFLs  -     Row Name 07/11/21 1053          Strength Comprehensive (MMT)    Comment, General Manual Muscle Testing (MMT) Assessment  BLE gross strength >/=3+/5.  -     Row Name 07/11/21 1053          Balance    Balance Assessment  sitting static balance;sitting dynamic balance;standing static  balance;standing dynamic balance  -KH     Static Sitting Balance  WFL;sitting, edge of bed;unsupported  -KH     Dynamic Sitting Balance  WFL;sitting, edge of bed;unsupported  -KH     Static Standing Balance  WFL;supported  -KH     Dynamic Standing Balance  mild impairment;supported  -KH       User Key  (r) = Recorded By, (t) = Taken By, (c) = Cosigned By    Initials Name Provider Type    Briana Frias, PT Physical Therapist        Goals/Plan     Row Name 07/11/21 1108          Bed Mobility Goal 1 (PT)    Activity/Assistive Device (Bed Mobility Goal 1, PT)  bed mobility activities, all  -KH     [place] Level/Cues Needed (Bed Mobility Goal 1, PT)  independent  -KH     Time Frame (Bed Mobility Goal 1, PT)  5 days  -[facility] Name 07/11/21 1108          Transfer Goal 1 (PT)    Activity/Assistive Device (Transfer Goal 1, PT)  transfers, all;walker, rolling  -KH     [place] Level/Cues Needed (Transfer Goal 1, PT)  modified independence  -KH     Time Frame (Transfer Goal 1, PT)  5 days  -[facility] Name 07/11/21 1108          Gait Training Goal 1 (PT)    Activity/Assistive Device (Gait Training Goal 1, PT)  gait (walking locomotion);walker, rolling  -KH     [place] Level (Gait Training Goal 1, PT)  supervision required  -KH     Time Frame (Gait Training Goal 1, PT)  5 days  -       User Key  (r) = Recorded By, (t) = Taken By, (c) = Cosigned By    Initials Name Provider Type    Briana Frias, PT Physical Therapist        Clinical Impression     [facility] Name 07/11/21 1055          Pain    Additional Documentation  Pain Scale: Numbers Pre/Post-Treatment (Group)  -KH     Row Name 07/11/21 1055          Pain Scale: Numbers Pre/Post-Treatment    Pretreatment Pain Rating  0/10 - no pain  -     Posttreatment Pain Rating  0/10 - no pain  -[facility] Name 07/11/21 1059 07/11/21 1055       Plan of Care Review    Outcome Summary  Pt is a 59 y/o male referred for PT following stroke. Pt's medical history  includes CAD and CKD. Pt is independent for mobility at baseline and works for [employer]. Pt presents with decreased strength, decreased endurance/activity tolerance, and impaired balance impacting his mobility. He performed bed mobility with modified vicki massey with CGA, and ambulated 160 ft. with 1 UE support at [facility]. Pt may benefit from acute skilled PT to address noted deficits and facilitate safe return home at [facility]. Recommend pt receive RWX if DC home at this time.  -  Pt is a 59 y/o male diagnosed  -    Row Name 07/11/21 1059          Therapy Assessment/Plan (PT)    Patient/Family Therapy Goals Statement (PT)  go home  -     Rehab Potential (PT)  good, to achieve stated therapy goals  -     Criteria for Skilled Interventions Met (PT)  yes;skilled treatment is necessary  -     Row Name 07/11/21 1059          Vital Signs    O2 Delivery Pre Treatment  room air  -     O2 Delivery Intra Treatment  room air  -     O2 Delivery Post Treatment  room air  -     Row Name 07/11/21 1059          Positioning and Restraints    Pre-Treatment Position  in bed  -     Post Treatment Position  chair  -     In Chair  reclined;call light within reach;encouraged to call for assist;exit alarm on;with nsg  -       User Key  (r) = Recorded By, (t) = Taken By, (c) = Cosigned By    Initials Name Provider Type     Briana Gramajo, PT Physical Therapist        Outcome Measures     Row Name 07/11/21 1110          How much help from another person do you currently need...    Turning from your back to your side while in flat bed without using bedrails?  4  -KH     Moving from lying on back to sitting on the side of a flat bed without bedrails?  4  -KH     Moving to and from a bed to a chair (including a wheelchair)?  3  -KH     Standing up from a chair using your arms (e.g., wheelchair, bedside chair)?  3  -KH     Climbing 3-5 steps with a railing?  3  -KH     To walk in hospital room?  3  -KH     AM-PAC 6  Clicks Score (PT)  20  -     Row Name 07/11/21 1110          Functional Assessment    Outcome Measure Options  AM-PAC 6 Clicks Basic Mobility (PT)  -       User Key  (r) = Recorded By, (t) = Taken By, (c) = Cosigned By    Initials Name Provider Type    Briana Frias, RTUONG Physical Therapist        Physical Therapy Education                 Title: PT OT SLP Therapies (In Progress)     Topic: Physical Therapy (In Progress)     Point: Mobility training (Done)     Learning Progress Summary           Patient Acceptance, E, VU by  at 7/11/2021 1111                   Point: Home exercise program (Not Started)     Learner Progress:  Not documented in this visit.          Point: Body mechanics (Done)     Learning Progress Summary           Patient Acceptance, E, VU by  at 7/11/2021 1111                   Point: Precautions (Done)     Learning Progress Summary           Patient Acceptance, E, VU by  at 7/11/2021 1111                               User Key     Initials Effective Dates Name Provider Type [facility] 06/16/21 -  Briana Gramajo, TRUONG Physical Therapist PT              PT Recommendation and Plan  Planned Therapy Interventions (PT): balance training, bed mobility training, gait training, home exercise program, patient/family education, stair training, strengthening, transfer training  Outcome Summary: Pt is a 59 y/o male referred for PT following stroke. Pt's medical history includes CAD and CKD. Pt is independent for mobility at baseline and works for [employer]. Pt presents with decreased strength, decreased endurance/activity tolerance, and impaired balance impacting his mobility. He performed bed mobility with modified independence, prestoners with CGA, and ambulated 160 ft. with 1 UE support at [facility]. Pt may benefit from acute skilled PT to address noted deficits and facilitate safe return home at [facility]. Recommend pt receive RWX if DC home at this time.     Time Calculation:   PT Charges     Row Name  07/11/21 1112             Time Calculation    Start Time  1028  -KH      Stop Time  1048  -KH      Time Calculation (min)  20 min  -KH      PT Non-Billable Time (min)  10 min  -KH      PT Received On  07/11/21  -KH      PT - Next Appointment  07/12/21  -KH      PT Goal Re-Cert Due Date  07/16/21  -KH         Timed Charges    16730 - PT Therapeutic Exercise Minutes  10  -KH         Untimed Charges    PT Eval/Re-eval Minutes  10  -KH         Total Minutes    Timed Charges Total Minutes  10  -KH      Untimed Charges Total Minutes  10  -KH       Total Minutes  20  -KH        User Key  (r) = Recorded By, (t) = Taken By, (c) = Cosigned By    Initials Name Provider Type    Briana Frias, PT Physical Therapist        Therapy Charges for Today     Code Description Service Date Service Provider Modifiers Qty    44218467884 HC PT THER PROC EA 15 MIN 7/11/2021 Briana Gramajo, PT GP 1    35959155417 HC PT EVAL LOW COMPLEXITY 2 7/11/2021 Briana Gramajo, PT GP 1          PT G-Codes  Outcome Measure Options: AM-PAC 6 Clicks Basic Mobility (PT)  AM-PAC 6 Clicks Score (PT): 20    Briana Gramajo PT  7/11/2021

## 2021-07-11 NOTE — PLAN OF CARE
Goal Outcome Evaluation:              Outcome Summary: Pt is a 61 y/o male referred for PT following stroke. Pt's medical history includes CAD and CKD. Pt is independent for mobility at baseline and works for [employer]. Pt presents with decreased strength, decreased endurance/activity tolerance, and impaired balance impacting his mobility. He performed bed mobility with modified independence, trasnfers with CGA, and ambulated 160 ft. with 1 UE support at [facility]. Pt may benefit from acute skilled PT to address noted deficits and facilitate safe return home at [facility]. Recommend pt receive RWX if DC home at this time.    Patient was intermittently wearing a face mask during this therapy encounter. Therapist used appropriate personal protective equipment including eye protection, mask, and gloves.  Mask used was standard procedure mask. Appropriate PPE was worn during the entire therapy session. Hand hygiene was completed before and after therapy session. Patient is not in enhanced droplet precautions.

## 2021-07-11 NOTE — DISCHARGE SUMMARY
Patient Name: Damien Peña  : 1960  MRN: [id number]    Date of Admission: 2021  Date of Discharge:  2021  Primary Care Physician: Provider, No Known      Chief Complaint:   Vomiting and Nausea      Discharge Diagnoses     Active Hospital Problems    Diagnosis  POA   • Left ventricular apical thrombus [I51.3]  Yes   • Essential hypertension [I10]  Yes   • Vertigo [R42]  Yes   • CKD (chronic kidney disease) [N18.9]  Yes   • Tobacco abuse [Z72.0]  Yes   • Coronary artery disease involving native coronary artery of native heart without angina pectoris [I25.10]  Yes      Resolved Hospital Problems    Diagnosis Date Resolved POA   • **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 Peña is a 60 y.o. male with a history of CAD, CKD 3, tobacco use, hsitory of left renal artery thrombus, history of left ventricular apical thrombus, non-adherence with prescribed eliquis who presented to [facility] initially complaining of vertigo and vomiting.  Please see the admitting history and physical for further details.  He was found to have an acute right cerebellar infarct and was admitted to the hospital for further evaluation and treatment.      He was seen in consultation by neurology and cardiology. A repeat head CT showed no bleeding, and so he was restarted on his eliquis. A TTE was performed which showed a solid and fixed LV apical thrombus. He was recommended to continue with plavix monotherapy and a high dose statin. He is to follow up with cardiology in a few weeks, neurology in 3 months or sooner if needed.    He's going home with home health and a walker per PT recommendations.    Day of Discharge     Subjective:  No events overnight. No complaints.    Physical Exam:  Temp:  [97.7 °F (36.5 °C)-98.2 °F (36.8 °C)] 97.9 °F (36.6 °C)  Heart Rate:  [52-65] 55  Resp:  [14-16] 14  BP: ()/(69-83)  125/82  Body mass index is 19.6 kg/m².  Physical Exam  Constitutional:       General: He is not in acute distress.  Cardiovascular:      Rate and Rhythm: Normal rate and regular rhythm.      Heart sounds: Normal heart sounds.   Pulmonary:      Effort: Pulmonary effort is normal.      Breath sounds: Normal breath sounds.   Abdominal:      General: Bowel sounds are normal.      Palpations: Abdomen is soft.   Musculoskeletal:         General: No tenderness.      Right lower leg: No edema.      Left lower leg: No edema.   Neurological:      Mental Status: He is alert.   Psychiatric:         Mood and Affect: Mood normal.         Behavior: Behavior normal.         Consultants     Consult Orders (all) (From admission, onward)     Start     Ordered    07/10/21 1351  Inpatient Case Management  Consult  Once     Provider:  (Not yet assigned)    07/10/21 1350    07/09/21 1419  Inpatient Cardiology Consult  Once     Specialty:  Cardiology  Provider:  Jose Rivas MD    07/09/21 1419    07/09/21 0927  Notify Stroke Coordinator  Once     Provider:  (Not yet assigned)    07/09/21 0927 07/08/21 2133  [facility] (on-call MD unless specified) Details  Once     Specialty:  Hospitalist  Provider:  Osmin Shannon MD    07/08/21 2132              Procedures     Imaging Results (All)     Procedure Component Value Units Date/Time    CT Head Without Contrast [[id number]] Collected: 07/10/21 0955     Updated: 07/10/21 1106    Narrative:      CT SCAN OF THE BRAIN WITHOUT CONTRAST     HISTORY: Acute right cerebellar infarct.     FINDINGS: The CT scan was performed through the brain without contrast  and compared to the MRI scan performed yesterday and CT scan performed 2  days ago. There is further evolutionary change with decreased density  involving the acute right cerebellar infarct when compared to 2 days  ago. The involved area measures approximately 1.8 x 2.8 cm and there is  no evidence of hemorrhagic transformation.  There is no significant mass  effect. The remainder of the CT scan is unremarkable.                 Radiation dose reduction techniques were utilized, including automated  exposure control and exposure modulation based on body size.     This report was finalized on 7/10/2021 11:03 AM by Dr. Hayden Fowler M.D.       MRI Brain With & Without Contrast [[id number]] Collected: 07/09/21 0913     Updated: 07/09/21 1159    Narrative:      MRI OF THE BRAIN WITH AND WITHOUT CONTRAST 07/09/2021     CLINICAL HISTORY: Patient complains of dizziness and nausea.     TECHNIQUE: Axial T1, FLAIR, fat-suppressed T2, axial diffusion and  gradient echo T2, sagittal T1 and postcontrast axial fat-suppressed T1  and coronal T1-weighted images were obtained of the entire head. In  addition, thin cut axial T2 and thin cut pre and postcontrast axial and  coronal T1-weighted images were obtained through the internal auditory  canals.     COMPARISON: This is correlated to yesterday's noncontrast head CT  07/08/2021.     FINDINGS: There is a focal area of intense increased signal intensity on  the diffusion-weighted images and high signal on the FLAIR and  T2-weighted images, low signal on the T1-weighted images, and marked  hypointensity on ADC maps indicating true restricted diffusion and  cytotoxic edema extending through the superior medial right cerebellum  that measures approximately 2.2 x 2.4 x 2.3 cm in anterior posterior,  medial lateral and craniocaudal dimension, is consistent with an acute  infarct in the distribution of the superior cerebellar branches of the  right superior cerebellar artery territory. The remainder of the brain  parenchyma is normal in signal intensity. The ventricles are normal in  size. I see no focal mass effect and no midline shift and no extra-axial  fluid collections are identified. No abnormal areas of enhancement are  seen in the head. The paranasal sinuses are clear. There is a tiny  amount of fluid in  the inferior tip of the right mastoid air cells,  partial opacification inferior left mastoid air cells with fluid. There  is some T2 hyperintensity within the post PICA segment of the small size  right vertebral artery and the post PICA segment is tiny in diameter. On  yesterday's CT angiogram of the head and neck that appeared to be  patent, and it probably has slow flow due to its small size. Otherwise,  good flow voids are demonstrated within the cerebral vessels and in the  dural venous sinuses.     Thin cut images through the internal auditory canals demonstrate no  abnormal enhancement within the cerebellopontine angles or within the  internal auditory canals or within the substance of the temporal bones.       Impression:      1. Small amount of fluid in the inferior mastoid air cells bilaterally.  2. There is a 2.2 x 2.4 x 2.3 cm acute infarct in the superior medial  right cerebellum within the right superior cerebellar artery territory.  There is some T2 high signal within the post PICA segment of the small  size right vertebral artery, and the post PICA segment appeared patent  on yesterday's CT angiogram. It likely has slow or turbulent flow due to  its small size. The remainder of the MRI of the brain is normal. Thin  cut images through the internal auditory canals are also normal. The  results were communicated to Keyur Becerra MD, the stroke  neurologist taking care of the patient by telephone on 07/09/2021 at  9:00 AM.     This report was finalized on 7/9/2021 11:56 AM by Dr. Curry Barrett M.D.       CT Head Without Contrast [[id number]] Collected: 07/08/21 2232     Updated: 07/09/21 0636    Narrative:      EMERGENCY NONCONTRAST HEAD CT 07/08/2021     CLINICAL HISTORY: Vertigo that is not improving.     TECHNIQUE: Spiral CT images were obtained from the base of the skull to  the vertex without intravenous contrast. Images were re-formatted and  submitted in 3 mm thick axial CT sections with brain  algorithm and 2 mm  thick sagittal and coronal reconstructions were performed and submitted  in brain algorithm.     COMPARISON: There are no prior studies from [facility] for comparison.     FINDINGS: On coronal reconstructed images 62 through 65 there is, and  also seen on axial images 18 through 20, is a 1.7 x 1.5 cm area of low  density in the superior medial right cerebellum. This is nonspecific and  could be artifact, potentially it could be a subtle acute infarct.  Otherwise the brain parenchyma is normal in attenuation. The ventricles  are normal in size. I see no mass effect, no midline shift, no  extra-axial fluid collections are identified and there is no evidence of  acute intracranial hemorrhage. There is partial opacification of the  inferior left mastoid air cells with fluid; otherwise, the paranasal  sinuses, mastoid air cells and middle ear cavities are clear. Calvarium  and skull base are normal in appearance.       Impression:      1. Small amount of fluid partially opacifies the inferior left mastoid  air cells.   2. There is a subtle area of low-density extending through the superior  medial right cerebellum that involves the white matter and extends  through the superior medial right cerebellar cortex. It measures 17 x 15  x 15 mm in size. There is a nonspecific finding potentially it could be  artifact, potentially it could be an subtle to acute superior medial  right cerebellar infarct in the right superior cerebellar artery  territory and I recommended an MRI of the brain to further evaluate. The  remainder of the head CT is normal.      My findings and recommendation for an MRI of the brain to further  evaluate the superior right cerebellum were communicated to TRUPTI Jones by telephone on 07/08/2021 at 8:55 PM     Radiation dose reduction techniques were utilized, including automated  exposure control and exposure modulation based on body size.     This report  was finalized on 7/9/2021 6:33 AM by Dr. Curry Barrett M.D.       CT Angiogram Head w AI Analysis of LVO [[id number]] Collected: 07/09/21 0028     Updated: 07/09/21 0052    Narrative:      PROCEDURE: CT ANGIOGRAM HEAD W AI ANALYSIS OF LVO-, CT ANGIOGRAM NECK-     HISTORY:  Cerebellar infarcts, nausea, vomiting, dizziness.     TECHNIQUE: CT images of the brain were obtained without intravenous  contrast. Following the administration of intravenous contrast, CT  images of the head and neck were obtained. Reformatted and 3-D images  were reviewed. Radiation dose reduction techniques were utilized,  including automated exposure control and exposure modulation based on  body size. AI analysis of LVO was utilized.     COMPARISON:  CT head without contrast 07/08/2021 at 8:38 PM.     FINDINGS:     Non Contrast CT Brain:     The ventricles and sulci are within normal limits.  There is no  hydrocephalus.  No acute intracranial hemorrhage or pathologic  extra-axial collection is identified.  There is no midline shift or mass  effect.  The basal cisterns are patent. A few scattered foci of  subcortical and periventricular hypoattenuation are nonspecific but  favored to reflect mild small vessel ischemic disease. There is  redemonstration of a focal area of hypoattenuation involving the cortex  and subcortical superior right cerebellar hemisphere, measuring  approximately 2.3 x 1.6 cm, which is suspicious for possible acute  subacute infarct. There is calcific intracranial atherosclerosis. There  are trace bilateral mastoid effusions.     CT Angiography Head:     The visible internal carotid arteries, middle and anterior cerebral  arteries demonstrate normal contrast-related enhancement.  The anterior  communicating artery complex has a normal appearance. Bilateral  posterior communicating arteries are identified, right larger than left.     The left vertebral artery is dominant. The right V4 segment is diffusely  diminutive,  likely developmental. There is a diminutive right P1  segment, which may be developmental. The vertebrobasilar circulation and  both posterior cerebral arteries demonstrate normal contrast-related  enhancement. The superior cerebellar arteries are duplicated  bilaterally. The right superior cerebellar arteries are asymmetrically  diminutive and poorly opacified distally. Bilateral PICA origins are  identified.     No aneurysm or other vascular malformation is identified.       CT Angiography Neck:     There is a 3 vessel aortic arch.  The origins of the vessels arising  from the arch are patent.  There is trace calcific atherosclerosis at  the bifurcation of the brachiocephalic artery.     The common carotid arteries have a normal caliber.  The carotid bulbs  are within normal limits.  The bilateral internal carotid arteries  demonstrate no evidence of flow-limiting stenosis or occlusion. NASCET  criteria was utilized. The distal cervical left internal carotid artery  is slightly tortuous.     The vertebral arteries arise from the subclavian arteries.  The left  vertebral artery is dominant.  There is no evidence of flow-limiting  stenosis or occlusion of the vertebral arteries.     There are calcified bilateral palatine tonsilloliths. There is  centrilobular and paraseptal emphysema in the lung apices. There is mild  biapical pleuroparenchymal fibrosis, right and left. There are calcified  biapical pleural plaques, left greater than right. There is multilevel  degenerative disc disease. There is multifocal dental disease consisting  of dental caries and periapical lucencies, incompletely assessed.          Impression:         1.  Redemonstration of a small area of hypoattenuation in the superior  right cerebellar hemisphere, suspicious for an acute to subacute  infarct. This would be better assessed with MRI. The right superior  cerebellar artery is asymmetrically diminutive and poorly opacified  distally and may  be occluded.  2.  Background mild small vessel ischemic disease.  3.  No acute intracranial hemorrhage.     Discussed with Rosemary, the patient's nurse, at 12:50 AM.     This report was finalized on 7/9/2021 12:49 AM by Dr. Kerrie Etienne M.D.       CT Angiogram Neck [[id number]] Collected: 07/09/21 0028     Updated: 07/09/21 0052    Narrative:      PROCEDURE: CT ANGIOGRAM HEAD W AI ANALYSIS OF LVO-, CT ANGIOGRAM NECK-     HISTORY:  Cerebellar infarcts, nausea, vomiting, dizziness.     TECHNIQUE: CT images of the brain were obtained without intravenous  contrast. Following the administration of intravenous contrast, CT  images of the head and neck were obtained. Reformatted and 3-D images  were reviewed. Radiation dose reduction techniques were utilized,  including automated exposure control and exposure modulation based on  body size. AI analysis of LVO was utilized.     COMPARISON:  CT head without contrast 07/08/2021 at 8:38 PM.     FINDINGS:     Non Contrast CT Brain:     The ventricles and sulci are within normal limits.  There is no  hydrocephalus.  No acute intracranial hemorrhage or pathologic  extra-axial collection is identified.  There is no midline shift or mass  effect.  The basal cisterns are patent. A few scattered foci of  subcortical and periventricular hypoattenuation are nonspecific but  favored to reflect mild small vessel ischemic disease. There is  redemonstration of a focal area of hypoattenuation involving the cortex  and subcortical superior right cerebellar hemisphere, measuring  approximately 2.3 x 1.6 cm, which is suspicious for possible acute  subacute infarct. There is calcific intracranial atherosclerosis. There  are trace bilateral mastoid effusions.     CT Angiography Head:     The visible internal carotid arteries, middle and anterior cerebral  arteries demonstrate normal contrast-related enhancement.  The anterior  communicating artery complex has a normal appearance.  Bilateral  posterior communicating arteries are identified, right larger than left.     The left vertebral artery is dominant. The right V4 segment is diffusely  diminutive, likely developmental. There is a diminutive right P1  segment, which may be developmental. The vertebrobasilar circulation and  both posterior cerebral arteries demonstrate normal contrast-related  enhancement. The superior cerebellar arteries are duplicated  bilaterally. The right superior cerebellar arteries are asymmetrically  diminutive and poorly opacified distally. Bilateral PICA origins are  identified.     No aneurysm or other vascular malformation is identified.       CT Angiography Neck:     There is a 3 vessel aortic arch.  The origins of the vessels arising  from the arch are patent.  There is trace calcific atherosclerosis at  the bifurcation of the brachiocephalic artery.     The common carotid arteries have a normal caliber.  The carotid bulbs  are within normal limits.  The bilateral internal carotid arteries  demonstrate no evidence of flow-limiting stenosis or occlusion. NASCET  criteria was utilized. The distal cervical left internal carotid artery  is slightly tortuous.     The vertebral arteries arise from the subclavian arteries.  The left  vertebral artery is dominant.  There is no evidence of flow-limiting  stenosis or occlusion of the vertebral arteries.     There are calcified bilateral palatine tonsilloliths. There is  centrilobular and paraseptal emphysema in the lung apices. There is mild  biapical pleuroparenchymal fibrosis, right and left. There are calcified  biapical pleural plaques, left greater than right. There is multilevel  degenerative disc disease. There is multifocal dental disease consisting  of dental caries and periapical lucencies, incompletely assessed.          Impression:         1.  Redemonstration of a small area of hypoattenuation in the superior  right cerebellar hemisphere, suspicious for an  acute to subacute  infarct. This would be better assessed with MRI. The right superior  cerebellar artery is asymmetrically diminutive and poorly opacified  distally and may be occluded.  2.  Background mild small vessel ischemic disease.  3.  No acute intracranial hemorrhage.     Discussed with Rosemary, the patient's nurse, at 12:50 AM.     This report was finalized on 7/9/2021 12:49 AM by Dr. Kerrie Etienne M.D.             Pertinent Labs     Results from last 7 days   Lab Units 07/10/21  0616 07/08/21  1710   WBC 10*3/mm3 8.69 8.38   HEMOGLOBIN g/dL 14.8 16.3   PLATELETS 10*3/mm3 169 189     Results from last 7 days   Lab Units 07/10/21  0616 07/08/21  1710   SODIUM mmol/L 140 138   POTASSIUM mmol/L 4.0 3.4*   CHLORIDE mmol/L 111* 106   CO2 mmol/L 23.3 24.5   BUN mg/dL 9 11   CREATININE mg/dL 0.99 1.06   GLUCOSE mg/dL 90 148*   Estimated Creatinine Clearance: 65.9 mL/min (by C-G formula based on SCr of 0.99 mg/dL).  Results from last 7 days   Lab Units 07/08/21  1710   ALBUMIN g/dL 4.00   BILIRUBIN mg/dL 0.5   ALK PHOS U/L 53   AST (SGOT) U/L 13   ALT (SGPT) U/L 12     Results from last 7 days   Lab Units 07/10/21  0616 07/08/21  1710   CALCIUM mg/dL 8.0* 8.4*   ALBUMIN g/dL  --  4.00       Results from last 7 days   Lab Units 07/08/21  1710   TROPONIN T ng/mL <0.010   PROBNP pg/mL 129.7       Results from last 7 days   Lab Units 07/10/21  0616   CHOLESTEROL mg/dL 136   TRIGLYCERIDES mg/dL 77   HDL CHOL mg/dL 35*   LDL CHOL mg/dL 86           Test Results Pending at Discharge       Discharge Details        Discharge Medications      New Medications      Instructions Start Date   atorvastatin 40 MG tablet  Commonly known as: LIPITOR   40 mg, Oral, Nightly         Continue These Medications      Instructions Start Date   apixaban 5 MG tablet tablet  Commonly known as: ELIQUIS   5 mg, Oral, Every 12 Hours Scheduled      clopidogrel 75 MG tablet  Commonly known as: PLAVIX   TAKE 1 TABLET BY MOUTH EVERY DAY       lisinopril 2.5 MG tablet  Commonly known as: PRINIVIL,ZESTRIL   TAKE 1 TABLET BY MOUTH EVERY DAY      metoprolol succinate XL 25 MG 24 hr tablet  Commonly known as: TOPROL-XL   25 mg, Oral, Daily         Stop These Medications    aspirin 81 MG tablet            No Known Allergies      Discharge Disposition:  Home or Self Care    Discharge Diet:  Diet Order   Procedures   • Diet Regular       Discharge Activity:   Activity Instructions     Activity as Tolerated            CODE STATUS:    Code Status and Medical Interventions:   Ordered at: 07/09/21 0054     Code Status:    CPR     Medical Interventions (Level of Support Prior to Arrest):    Full       No future appointments.  Additional Instructions for the Follow-ups that You Need to Schedule     Call MD With Problems / Concerns   As directed      Instructions: call your primary care physician if you experience chest pain, shortness of breath, abdominal pain, nausea, vomiting, fevers, sweats, chills, or worsening of your symptoms    Order Comments: Instructions: call your primary care physician if you experience chest pain, shortness of breath, abdominal pain, nausea, vomiting, fevers, sweats, chills, or worsening of your symptoms          Discharge Follow-up with PCP   As directed       Currently Documented PCP:    Provider, No Known    PCP Phone Number:    [phone]     Follow Up Details: 1-2 weeks         Discharge Follow-up with Specialty: Cardiology, Dr. Campos 2-4 weeks or as directed   As directed      Specialty: CardiologyDr. Campos 2-4 weeks or as directed         Discharge Follow-up with Specified Provider: neurology 3 months or as directed   As directed      To: neurology 3 months or as directed           Follow-up Information     Cathi Zapien APRN Follow up in 3 month(s).    Specialties: Neurology, Nurse Practitioner, Emergency Medicine  Contact information:  [address] [place] [address]  [phone]             Provider, No Known  .    Why: 1-2 weeks  Contact information:  [facility] [place] [address]  [phone]                   Additional Instructions for the Follow-ups that You Need to Schedule     Call MD With Problems / Concerns   As directed      Instructions: call your primary care physician if you experience chest pain, shortness of breath, abdominal pain, nausea, vomiting, fevers, sweats, chills, or worsening of your symptoms    Order Comments: Instructions: call your primary care physician if you experience chest pain, shortness of breath, abdominal pain, nausea, vomiting, fevers, sweats, chills, or worsening of your symptoms          Discharge Follow-up with PCP   As directed       Currently Documented PCP:    Provider, No Known    PCP Phone Number:    [phone]     Follow Up Details: 1-2 weeks         Discharge Follow-up with Specialty: Cardiology, Dr. Campos 2-4 weeks or as directed   As directed      Specialty: CardiologyDr. Campos 2-4 weeks or as directed         Discharge Follow-up with Specified Provider: neurology 3 months or as directed   As directed      To: neurology 3 months or as directed           Time Spent on Discharge:  Greater than 30 minutes      Slick Romero MD  [facility]  07/11/21  12:17 EDT

## 2021-07-11 NOTE — CASE MANAGEMENT/SOCIAL WORK
Continued Stay Note  [facility]     Patient Name: Damien Peña  MRN: [id number]  Today's Date: 7/11/2021    Admit Date: 7/8/2021    Discharge Plan     Row Name 07/11/21 1622       Plan    Plan Comments  Walker delivered to patient's CCP.  Son in law at bedside for transportation.  Staff RN made aware of delivery. MARISSASon Chon    Row Name 07/11/21 1531       Plan    Plan  Home with Home Health    Provided Post Acute Provider List?  Yes    Post Acute Provider List  Home Health    Provided Post Acute Provider Quality & Resource List?  Yes    Post Acute Provider Quality and Resource List  Home Health    Delivered To  Patient    Method of Delivery  In person    Patient/Family in Agreement with Plan  yes    Plan Comments  Plan is home with HH  CCP spoke with patient via telephone to discuss plans.  Patient declines preference related to  agencies or [employer] companies and patient declines.  [facility] to follow.  Rolling walker via [place] to be supplied to patient's bedside by CCP prior to discharge.  Patient working on arranging transporation.  Staff RN made aware of need for 'Ambulatory referral to home health' prior to discharge.  LINSEY West    Row Name 07/11/21 1333       Plan    Plan  Undetermined.  Awaiting PT eval related to stairs        Discharge Codes    No documentation.       Expected Discharge Date and Time     Expected Discharge Date Expected Discharge Time    Jul 11, 2021             Geovanny West

## 2021-07-11 NOTE — PROGRESS NOTES
Patient is discharging today . Face sheet information is correct, Patient reports has no PCP currently , but neurology TRUPTI Burns will manage home health. Orders for home health  SN,PT,OT ,ST are in Epic . Thank you!

## 2021-07-11 NOTE — CASE MANAGEMENT/SOCIAL WORK
Continued Stay Note  [facility]     Patient Name: Damien Peña  MRN: [id number]  Today's Date: 7/11/2021    Admit Date: 7/8/2021    Discharge Plan     Row Name 07/11/21 1531       Plan    Plan  Home with Home Health    Provided Post Acute Provider List?  Yes    Post Acute Provider List  Home Health    Provided Post Acute Provider Quality & Resource List?  Yes    Post Acute Provider Quality and Resource List  Home Health    Delivered To  Patient    Method of Delivery  In person    Patient/Family in Agreement with Plan  yes    Plan Comments  Plan is home with HH  CCP spoke with patient via telephone to discuss plans.  Patient declines preference related to  agencies or [employer].  [facility] to follow.  Rolling walker via [place] to be supplied to patient's bedside by CCP prior to discharge.  Patient working on arranging transporation.  Staff RN made aware of need for 'Ambulatory referral to home health' prior to discharge.  LINSEY West    Row Name 07/11/21 5472       Plan    Plan  Undetermined.  Awaiting PT eval related to stairs        Discharge Codes    No documentation.       Expected Discharge Date and Time     Expected Discharge Date Expected Discharge Time    Jul 11, 2021             Geovanny West

## 2021-07-11 NOTE — DISCHARGE PLACEMENT REQUEST
"Pat Boss (60 y.o. Male)     Date of Birth Social Security Number Address Home Phone MRN    1960  [id number]    [place] [address] [phone] [id number]    [facility] Mar[facility] Status          None Single       Admission Date Admission Type Admitting Provider Attending Provider Department, Room/Bed    7/8/21 Emergency Osmin Shannon MD Snyder, Perry, MD [address], [address]/1    Discharge Date Discharge Disposition Discharge Destination         Home or Self Care              Attending Provider: Slick Romero MD    Allergies: No Known Allergies    Isolation: None   Infection: None   Code Status: CPR    Ht: 172 cm (67.72\")   Wt: 58 kg (127 lb 13.9 oz)    Admission Cmt: None   Principal Problem: 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 (CMS/[facility]) [I63.441]                 Active Insurance as of 7/8/2021     Primary Coverage     Payor Plan Insurance Group Employer/Plan Group    [facility] [employer] [facility] [employer] [facility] [employer] PPO [id number]     Payor Plan Address Payor Plan Phone Number Payor Plan Fax Number Effective Dates    [address] [phone]  12/8/2019 - None Entered    [facility] [id number]       Subscriber Name Subscriber Birth Date Member ID       PAT BOSS 1960 [id number]                 Emergency Contacts      (Rel.) Home Phone Work Phone Mobile Phone    SUSHMA RASHID (Son) [phone] -- [phone]        "

## 2021-07-11 NOTE — DISCHARGE INSTRUCTIONS
At discharge: to prevent recurrent stroke we recommend :  Blood pressure < 130/80  B12 > 500   TSH in normal range   LDL < 70; HbA1c < 6.5 and smoking and alcohol cessation if applicable.

## 2021-07-11 NOTE — NURSING NOTE
Discharge recommendation from aCthi Zapien for home health for stroke, PT, OT, and Speech eval and nursing for medication compliance. Due to nature of work, unable to return to work until driving clearance obtained by Cathi Zapien. Patient should not drive for 1 month until follow-up with Cathi Zapien.

## 2021-07-11 NOTE — PROGRESS NOTES
"DOS: 2021  NAME: Damien Peña   : 1960  PCP: Provider, No Known  Chief Complaint   Patient presents with   • Vomiting   • Nausea          Stroke    Subjective: No events overnight.  Patient denies any new complaints under concerns on my exam.  Continues to have vertigo/vision changes with rapid head movements.     No family at bedside; reiterated importance of medication compliance and smoking cessation along with signs and symptoms of stroke, recommended outpatient follow-up-patient verbalizes understanding and agrees to treatment plan    Objective:  Vital signs: /82 (BP Location: Left arm, Patient Position: Lying)   Pulse 55   Temp 97.9 °F (36.6 °C) (Oral)   Resp 14   Ht 172 cm (67.72\")   Wt 58 kg (127 lb 13.9 oz)   SpO2 96%   BMI 19.60 kg/m²       HEENT: Normocephalic, atraumatic   COR: RRR  Resp: Even and unlabored  Extremities: Equal pulses, nondistal embolization    Neurological:   MS: AO. Language normal. No neglect. Higher integrative function normal  CN: II-XII normal except intermittent changes in vision which are not seen on exam again today  Motor: 5/5, normal tone  Reflexes: Toes downgoing  Sensory: Intact-to light touch  Coordination: Normal-finger-to-nose    Laboratory results:  Lab Results   Component Value Date    GLUCOSE 90 07/10/2021    CALCIUM 8.0 (L) 07/10/2021     07/10/2021    K 4.0 07/10/2021    CO2 23.3 07/10/2021     (H) 07/10/2021    BUN 9 07/10/2021    CREATININE 0.99 07/10/2021    EGFRIFNONA 77 07/10/2021    BCR 9.1 07/10/2021    ANIONGAP 5.7 07/10/2021     Lab Results   Component Value Date    WBC 8.69 07/10/2021    HGB 14.8 07/10/2021    HCT 42.1 07/10/2021    MCV 96.1 07/10/2021     07/10/2021     Lab Results   Component Value Date    CHOL 136 07/10/2021     Lab Results   Component Value Date    HDL 35 (L) 07/10/2021     Lab Results   Component Value Date    LDL 86 07/10/2021     Lab Results   Component Value Date    TRIG 77 07/10/2021 "      Lab Results   Component Value Date    HGBA1C 5.20 07/10/2021           Lab 07/10/21  0616   HEMOGLOBIN A1C 5.20                Review and interpretation of imaging:  MRI OF THE BRAIN WITH AND WITHOUT CONTRAST 07/09/2021     CLINICAL HISTORY: Patient complains of dizziness and nausea.     TECHNIQUE: Axial T1, FLAIR, fat-suppressed T2, axial diffusion and  gradient echo T2, sagittal T1 and postcontrast axial fat-suppressed T1  and coronal T1-weighted images were obtained of the entire head. In  addition, thin cut axial T2 and thin cut pre and postcontrast axial and  coronal T1-weighted images were obtained through the internal auditory  canals.     COMPARISON: This is correlated to yesterday's noncontrast head CT  07/08/2021.     FINDINGS: There is a focal area of intense increased signal intensity on  the diffusion-weighted images and high signal on the FLAIR and  T2-weighted images, low signal on the T1-weighted images, and marked  hypointensity on ADC maps indicating true restricted diffusion and  cytotoxic edema extending through the superior medial right cerebellum  that measures approximately 2.2 x 2.4 x 2.3 cm in anterior posterior,  medial lateral and craniocaudal dimension, is consistent with an acute  infarct in the distribution of the superior cerebellar branches of the  right superior cerebellar artery territory. The remainder of the brain  parenchyma is normal in signal intensity. The ventricles are normal in  size. I see no focal mass effect and no midline shift and no extra-axial  fluid collections are identified. No abnormal areas of enhancement are  seen in the head. The paranasal sinuses are clear. There is a tiny  amount of fluid in the inferior tip of the right mastoid air cells,  partial opacification inferior left mastoid air cells with fluid. There  is some T2 hyperintensity within the post PICA segment of the small size  right vertebral artery and the post PICA segment is tiny in diameter.  On  yesterday's CT angiogram of the head and neck that appeared to be  patent, and it probably has slow flow due to its small size. Otherwise,  good flow voids are demonstrated within the cerebral vessels and in the  dural venous sinuses.     Thin cut images through the internal auditory canals demonstrate no  abnormal enhancement within the cerebellopontine angles or within the  internal auditory canals or within the substance of the temporal bones.     IMPRESSION:  1. Small amount of fluid in the inferior mastoid air cells bilaterally.  2. There is a 2.2 x 2.4 x 2.3 cm acute infarct in the superior medial  right cerebellum within the right superior cerebellar artery territory.  There is some T2 high signal within the post PICA segment of the small  size right vertebral artery, and the post PICA segment appeared patent  on yesterday's CT angiogram. It likely has slow or turbulent flow due to  its small size. The remainder of the MRI of the brain is normal. Thin  cut images through the internal auditory canals are also normal. The  results were communicated to Keyur Becerra MD, the stroke  neurologist taking care of the patient by telephone on 07/09/2021 at  9:00 AM.     This report was finalized on 7/9/2021 11:56 AM by Dr. Curry Barrett M.D.     PROCEDURE: CT ANGIOGRAM HEAD W AI ANALYSIS OF LVO-, CT ANGIOGRAM NECK-     HISTORY:  Cerebellar infarcts, nausea, vomiting, dizziness.     TECHNIQUE: CT images of the brain were obtained without intravenous  contrast. Following the administration of intravenous contrast, CT  images of the head and neck were obtained. Reformatted and 3-D images  were reviewed. Radiation dose reduction techniques were utilized,  including automated exposure control and exposure modulation based on  body size. AI analysis of LVO was utilized.     COMPARISON:  CT head without contrast 07/08/2021 at 8:38 PM.     FINDINGS:     Non Contrast CT Brain:     The ventricles and sulci are within  normal limits.  There is no  hydrocephalus.  No acute intracranial hemorrhage or pathologic  extra-axial collection is identified.  There is no midline shift or mass  effect.  The basal cisterns are patent. A few scattered foci of  subcortical and periventricular hypoattenuation are nonspecific but  favored to reflect mild small vessel ischemic disease. There is  redemonstration of a focal area of hypoattenuation involving the cortex  and subcortical superior right cerebellar hemisphere, measuring  approximately 2.3 x 1.6 cm, which is suspicious for possible acute  subacute infarct. There is calcific intracranial atherosclerosis. There  are trace bilateral mastoid effusions.     CT Angiography Head:     The visible internal carotid arteries, middle and anterior cerebral  arteries demonstrate normal contrast-related enhancement.  The anterior  communicating artery complex has a normal appearance. Bilateral  posterior communicating arteries are identified, right larger than left.     The left vertebral artery is dominant. The right V4 segment is diffusely  diminutive, likely developmental. There is a diminutive right P1  segment, which may be developmental. The vertebrobasilar circulation and  both posterior cerebral arteries demonstrate normal contrast-related  enhancement. The superior cerebellar arteries are duplicated  bilaterally. The right superior cerebellar arteries are asymmetrically  diminutive and poorly opacified distally. Bilateral PICA origins are  identified.     No aneurysm or other vascular malformation is identified.       CT Angiography Neck:     There is a 3 vessel aortic arch.  The origins of the vessels arising  from the arch are patent.  There is trace calcific atherosclerosis at  the bifurcation of the brachiocephalic artery.     The common carotid arteries have a normal caliber.  The carotid bulbs  are within normal limits.  The bilateral internal carotid arteries  demonstrate no evidence of  flow-limiting stenosis or occlusion. NASCET  criteria was utilized. The distal cervical left internal carotid artery  is slightly tortuous.     The vertebral arteries arise from the subclavian arteries.  The left  vertebral artery is dominant.  There is no evidence of flow-limiting  stenosis or occlusion of the vertebral arteries.     There are calcified bilateral palatine tonsilloliths. There is  centrilobular and paraseptal emphysema in the lung apices. There is mild  biapical pleuroparenchymal fibrosis, right and left. There are calcified  biapical pleural plaques, left greater than right. There is multilevel  degenerative disc disease. There is multifocal dental disease consisting  of dental caries and periapical lucencies, incompletely assessed.        IMPRESSION:     1.  Redemonstration of a small area of hypoattenuation in the superior  right cerebellar hemisphere, suspicious for an acute to subacute  infarct. This would be better assessed with MRI. The right superior  cerebellar artery is asymmetrically diminutive and poorly opacified  distally and may be occluded.  2.  Background mild small vessel ischemic disease.  3.  No acute intracranial hemorrhage.     Discussed with Rosemary, the patient's nurse, at 12:50 AM.     This report was finalized on 7/9/2021 12:49 AM by Dr. Kerrie Etienne M.D.  EMERGENCY NONCONTRAST HEAD CT 07/08/2021     CLINICAL HISTORY: Vertigo that is not improving.     TECHNIQUE: Spiral CT images were obtained from the base of the skull to  the vertex without intravenous contrast. Images were re-formatted and  submitted in 3 mm thick axial CT sections with brain algorithm and 2 mm  thick sagittal and coronal reconstructions were performed and submitted  in brain algorithm.     COMPARISON: There are no prior studies from [facility] for comparison.     FINDINGS: On coronal reconstructed images 62 through 65 there is, and  also seen on axial images 18 through 20, is a 1.7 x  1.5 cm area of low  density in the superior medial right cerebellum. This is nonspecific and  could be artifact, potentially it could be a subtle acute infarct.  Otherwise the brain parenchyma is normal in attenuation. The ventricles  are normal in size. I see no mass effect, no midline shift, no  extra-axial fluid collections are identified and there is no evidence of  acute intracranial hemorrhage. There is partial opacification of the  inferior left mastoid air cells with fluid; otherwise, the paranasal  sinuses, mastoid air cells and middle ear cavities are clear. Calvarium  and skull base are normal in appearance.     IMPRESSION:  1. Small amount of fluid partially opacifies the inferior left mastoid  air cells.   2. There is a subtle area of low-density extending through the superior  medial right cerebellum that involves the white matter and extends  through the superior medial right cerebellar cortex. It measures 17 x 15  x 15 mm in size. There is a nonspecific finding potentially it could be  artifact, potentially it could be an subtle to acute superior medial  right cerebellar infarct in the right superior cerebellar artery  territory and I recommended an MRI of the brain to further evaluate. The  remainder of the head CT is normal.      My findings and recommendation for an MRI of the brain to further  evaluate the superior right cerebellum were communicated to TRUPTI Jones by telephone on 07/08/2021 at 8:55 PM     Radiation dose reduction techniques were utilized, including automated  exposure control and exposure modulation based on body size.     This report was finalized on 7/9/2021 6:33 AM by Dr. Curry Barrett M.D.       Impression: This is a 60-year-old male with history of apical thrombus (supposed to be on Eliquis and plan prior to arrival although reports noncompliance), hypertension, sinus bradycardia, CAD, tobacco abuse/current smoker, and CKD who presented to [facility] 7/8 due to  complaint of room spinning vertigo with associated vomiting.  Initial CT of the head showed subtle area of low density in right cerebellum thought to reflect acute infarct.  No mass no hemorrhage.  CTA of the head and neck again redemonstrated area of hypoattenuation in the superior right cerebellar hemisphere, evidence of mild small vessel ischemic disease, no evidence of dissection/aneurysm and/or occlusion noted.MRI of the brain since confirmed right cerebellar infarct.  TTE shows EF 46 to 50%.  LV normal.  Small flat solid thrombus noted left ventricle apex; thrombus is fixed.        Neurologically, stable with visual changes with rapid head movements still present.  Cardiology consult completed; recommend resuming Plavix in addition to full dose Eliquis along with reinforcement of compliance.  Other recommendations below. No further neurology workup indicated. We will sign off and see again upon request.      Plan:  Recommend rehab placement at discharge  Plavix 75 mg daily  Eliquis 5 mg twice daily-reported medication noncompliance prior to arrival was supposed to be on full dose Eliquis and Plavix 75 mg daily along with lisinopril and metoprolol  Decrease Lipitor 40 mg daily; LDL 80  [facility]  BP control  Stroke Education  LINDA/SCDs  PT/OT/ST  Follow-up with neurology in 3 months; sooner if needed  Follow-up with cardiology per their recommendation  Follow-up PCP in 1 month      Case reviewed with attending neurologist  07/10 and he agrees with treatment plan above.    TRUPTI White

## 2021-07-12 ENCOUNTER — TELEPHONE (OUTPATIENT)
Dept: NEUROLOGY | Facility: CLINIC | Age: 61
End: 2021-07-12

## 2021-07-12 ENCOUNTER — HOME CARE VISIT (OUTPATIENT)
Dept: HOME HEALTH SERVICES | Facility: HOME HEALTHCARE | Age: 61
End: 2021-07-12

## 2021-07-12 ENCOUNTER — TELEPHONE (OUTPATIENT)
Dept: CARDIOLOGY | Facility: CLINIC | Age: 61
End: 2021-07-12

## 2021-07-12 ENCOUNTER — READMISSION MANAGEMENT (OUTPATIENT)
Dept: CALL CENTER | Facility: HOSPITAL | Age: 61
End: 2021-07-12

## 2021-07-12 VITALS
DIASTOLIC BLOOD PRESSURE: 72 MMHG | HEART RATE: 56 BPM | OXYGEN SATURATION: 98 % | TEMPERATURE: 96.8 F | SYSTOLIC BLOOD PRESSURE: 122 MMHG

## 2021-07-12 VITALS
RESPIRATION RATE: 16 BRPM | HEART RATE: 56 BPM | DIASTOLIC BLOOD PRESSURE: 64 MMHG | SYSTOLIC BLOOD PRESSURE: 114 MMHG | TEMPERATURE: 96.8 F | OXYGEN SATURATION: 98 %

## 2021-07-12 DIAGNOSIS — I63.9 CEREBROVASCULAR ACCIDENT (CVA), UNSPECIFIED MECHANISM (HCC): Primary | ICD-10-CM

## 2021-07-12 PROCEDURE — G0153 HHCP-SVS OF S/L PATH,EA 15MN: HCPCS

## 2021-07-12 PROCEDURE — G0299 HHS/HOSPICE OF RN EA 15 MIN: HCPCS

## 2021-07-12 NOTE — TELEPHONE ENCOUNTER
Verito with [facility] wants to know if Cathi will write a order for Social work for the pt.  Please call her at [phone].    ThanksAlice

## 2021-07-12 NOTE — CASE MANAGEMENT/SOCIAL WORK
Case Management Discharge Note      Final Note: DC home with [facility]    Provided Post Acute Provider List?: Yes  Post Acute Provider List: Home Health  Provided Post Acute Provider Quality & Resource List?: Yes  Post Acute Provider Quality and Resource List: Home Health  Delivered To: Patient  Method of Delivery: In person    Selected Continued Care - Discharged on 7/11/2021 Admission date: 7/8/2021 - Discharge disposition: Home or Self Care    Destination    No services have been selected for the patient.              Durable Medical Equipment Coordination complete    Service Provider Selected Services Address Phone Fax Patient Preferred    [facility]  Durable Medical Equipment [address], [facility] [address] [phone] --          Dialysis/Infusion    No services have been selected for the patient.              Home Medical Care Coordination complete    Service Provider Selected Services Address Phone Fax Patient Preferred     [facility]  Home Health Services [address] [place] [address] [id number] [phone] --          Therapy    No services have been selected for the patient.              Community Resources    No services have been selected for the patient.              Community & DME    No services have been selected for the patient.                       Final Discharge Disposition Code: 06 - home with home health care ([facility])

## 2021-07-12 NOTE — OUTREACH NOTE
Prep Survey      Responses   [facility] facility patient discharged from?  [facility]   Is LACE score < 7 ?  No   Emergency Room discharge w/ pulse ox?  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  Stroke (TIA)   Does the patient have Home health ordered?  Yes   What is the Home health agency?   [facility]   Is there a DME ordered?  Yes   What DME was ordered?  rolling walker from [facility]   Prep survey completed?  Yes          Omaira Owens RN

## 2021-07-12 NOTE — TELEPHONE ENCOUNTER
Patient stopped taking his toprol per his home health nurse patient was in the hospital for a stroke on 07/08/2021

## 2021-07-13 ENCOUNTER — TELEPHONE (OUTPATIENT)
Dept: NEUROLOGY | Facility: CLINIC | Age: 61
End: 2021-07-13

## 2021-07-13 ENCOUNTER — HOME CARE VISIT (OUTPATIENT)
Dept: HOME HEALTH SERVICES | Facility: HOME HEALTHCARE | Age: 61
End: 2021-07-13

## 2021-07-13 VITALS
OXYGEN SATURATION: 95 % | SYSTOLIC BLOOD PRESSURE: 106 MMHG | HEART RATE: 57 BPM | TEMPERATURE: 96.8 F | DIASTOLIC BLOOD PRESSURE: 64 MMHG

## 2021-07-13 DIAGNOSIS — I63.9 CEREBROVASCULAR ACCIDENT (CVA), UNSPECIFIED MECHANISM (HCC): Primary | ICD-10-CM

## 2021-07-13 PROCEDURE — G0151 HHCP-SERV OF PT,EA 15 MIN: HCPCS

## 2021-07-13 NOTE — HOME HEALTH
REASON FOR REFERRAL:  decreased ability to ambulate in and out of the home following recent hospital stay for cerebral infarction due to embolism of right cerebellar artery   resulting in functional decline and LE weakness.    MEDICAL HISTORY: Left ventricular apical thrombus, essential hypertension, vertigo, CKD, tobacco abuse, coronary artery disease involving native coronary artery of native heart without angina pectoris.    SKILLED PHYSICAL THERAPY IS MEDICALLY NECESSARY FOR: Instruction/education in lower extremity strengthening HEP, bed mobility/transfers training, gait training, balance training, fall prevention, and activity tolerance training to enable patient to safely exit home for medical appointments.    Patient reports no changes in medications since SOC.

## 2021-07-13 NOTE — PAYOR COMM NOTE
"Pat Boss (60 y.o. Male)                                     ATTENTION;   DC SUMMARY CASE REF #        [id number]                                     PLEASE FAX DETERMINATION OF STAY TO  DEPT                                       OR ANETA , AJIT TRA [facility]   [phone]       Date of Birth Social Security Number Address Home Phone MRN    1960  [id number]    [place] [address] [phone] [id number]    Christian Marital Status          None Single       Admission Date Admission Type Admitting Provider Attending Provider Department, Room/Bed    21 Emergency Osmin Shannon MD  [address], S520/    Discharge Date Discharge Disposition Discharge Destination        2021 Home or Self Care              Attending Provider: (none)   Allergies: No Known Allergies    Isolation: None   Infection: None   Code Status: Prior    Ht: 172 cm (67.72\")   Wt: 58 kg (127 lb 13.9 oz)    Admission Cmt: None   Principal Problem: 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 (CMS/HCC) [I63.441]                 Active Insurance as of 2021     Primary Coverage     Payor Plan Insurance Group Employer/Plan Group    [employer] BLUE [employer]UE [employer] BLUE [employer] PPO [id number]     Payor Plan Address Payor Plan Phone Number Payor Plan Fax Number Effective Dates    [address] [phone]  2019 - None Entered    [facility] [id number]       Subscriber Name Subscriber Birth Date Member ID       PAT BOSS 1960 [id number]                 Emergency Contacts      (Rel.) Home Phone Work Phone Mobile Phone    SUSHMA RASHID (Son) [phone] -- [phone]               Discharge Summary      Slick Romero MD at 21 1216              Patient Name: Pat Boss  : 1960  MRN: [id number]    Date of Admission: 2021  Date of Discharge:  2021  Primary Care Physician: Provider, No " Known      Chief Complaint:   Vomiting and Nausea      Discharge Diagnoses     Active Hospital Problems    Diagnosis  POA   • Left ventricular apical thrombus [I51.3]  Yes   • Essential hypertension [I10]  Yes   • Vertigo [R42]  Yes   • CKD (chronic kidney disease) [N18.9]  Yes   • Tobacco abuse [Z72.0]  Yes   • Coronary artery disease involving native coronary artery of native heart without angina pectoris [I25.10]  Yes      Resolved Hospital Problems    Diagnosis Date Resolved POA   • **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 (CMS/HCC) [I63.441] 07/11/2021 Yes        Hospital Course     Mr. Peña is a 60 y.o. male with a history of CAD, CKD 3, tobacco use, hsitory of left renal artery thrombus, history of left ventricular apical thrombus, non-adherence with prescribed eliquis who presented to [facility] initially complaining of vertigo and vomiting.  Please see the admitting history and physical for further details.  He was found to have an acute right cerebellar infarct and was admitted to the hospital for further evaluation and treatment.      He was seen in consultation by neurology and cardiology. A repeat head CT showed no bleeding, and so he was restarted on his eliquis. A TTE was performed which showed a solid and fixed LV apical thrombus. He was recommended to continue with plavix monotherapy and a high dose statin. He is to follow up with cardiology in a few weeks, neurology in 3 months or sooner if needed.    He's going home with home health and a walker per PT recommendations.    Day of Discharge     Subjective:  No events overnight. No complaints.    Physical Exam:  Temp:  [97.7 °F (36.5 °C)-98.2 °F (36.8 °C)] 97.9 °F (36.6 °C)  Heart Rate:  [52-65] 55  Resp:  [14-16] 14  BP: ()/(69-83) 125/82  Body mass index is 19.6 kg/m².  Physical Exam  Constitutional:       General: He is not in acute distress.  Cardiovascular:      Rate and Rhythm: Normal rate and  regular rhythm.      Heart sounds: Normal heart sounds.   Pulmonary:      Effort: Pulmonary effort is normal.      Breath sounds: Normal breath sounds.   Abdominal:      General: Bowel sounds are normal.      Palpations: Abdomen is soft.   Musculoskeletal:         General: No tenderness.      Right lower leg: No edema.      Left lower leg: No edema.   Neurological:      Mental Status: He is alert.   Psychiatric:         Mood and Affect: Mood normal.         Behavior: Behavior normal.         Consultants     Consult Orders (all) (From admission, onward)     Start     Ordered    07/10/21 1351  Inpatient Case Management  Consult  Once     Provider:  (Not yet assigned)    07/10/21 1350    07/09/21 1419  Inpatient Cardiology Consult  Once     Specialty:  Cardiology  Provider:  Jose Rivas MD    07/09/21 1419    07/09/21 0927  Notify Stroke Coordinator  Once     Provider:  (Not yet assigned)    07/09/21 0927 07/08/21 2133  [facility] (on-call MD unless specified) Details  Once     Specialty:  Hospitalist  Provider:  Osmin Shannon MD    07/08/21 2132              Procedures     Imaging Results (All)     Procedure Component Value Units Date/Time    CT Head Without Contrast [[id number]] Collected: 07/10/21 0955     Updated: 07/10/21 1106    Narrative:      CT SCAN OF THE BRAIN WITHOUT CONTRAST     HISTORY: Acute right cerebellar infarct.     FINDINGS: The CT scan was performed through the brain without contrast  and compared to the MRI scan performed yesterday and CT scan performed 2  days ago. There is further evolutionary change with decreased density  involving the acute right cerebellar infarct when compared to 2 days  ago. The involved area measures approximately 1.8 x 2.8 cm and there is  no evidence of hemorrhagic transformation. There is no significant mass  effect. The remainder of the CT scan is unremarkable.                 Radiation dose reduction techniques were utilized, including  automated  exposure control and exposure modulation based on body size.     This report was finalized on 7/10/2021 11:03 AM by Dr. Hayden Fowler M.D.       MRI Brain With & Without Contrast [[id number]] Collected: 07/09/21 0913     Updated: 07/09/21 1159    Narrative:      MRI OF THE BRAIN WITH AND WITHOUT CONTRAST 07/09/2021     CLINICAL HISTORY: Patient complains of dizziness and nausea.     TECHNIQUE: Axial T1, FLAIR, fat-suppressed T2, axial diffusion and  gradient echo T2, sagittal T1 and postcontrast axial fat-suppressed T1  and coronal T1-weighted images were obtained of the entire head. In  addition, thin cut axial T2 and thin cut pre and postcontrast axial and  coronal T1-weighted images were obtained through the internal auditory  canals.     COMPARISON: This is correlated to yesterday's noncontrast head CT  07/08/2021.     FINDINGS: There is a focal area of intense increased signal intensity on  the diffusion-weighted images and high signal on the FLAIR and  T2-weighted images, low signal on the T1-weighted images, and marked  hypointensity on ADC maps indicating true restricted diffusion and  cytotoxic edema extending through the superior medial right cerebellum  that measures approximately 2.2 x 2.4 x 2.3 cm in anterior posterior,  medial lateral and craniocaudal dimension, is consistent with an acute  infarct in the distribution of the superior cerebellar branches of the  right superior cerebellar artery territory. The remainder of the brain  parenchyma is normal in signal intensity. The ventricles are normal in  size. I see no focal mass effect and no midline shift and no extra-axial  fluid collections are identified. No abnormal areas of enhancement are  seen in the head. The paranasal sinuses are clear. There is a tiny  amount of fluid in the inferior tip of the right mastoid air cells,  partial opacification inferior left mastoid air cells with fluid. There  is some T2 hyperintensity within the  post PICA segment of the small size  right vertebral artery and the post PICA segment is tiny in diameter. On  yesterday's CT angiogram of the head and neck that appeared to be  patent, and it probably has slow flow due to its small size. Otherwise,  good flow voids are demonstrated within the cerebral vessels and in the  dural venous sinuses.     Thin cut images through the internal auditory canals demonstrate no  abnormal enhancement within the cerebellopontine angles or within the  internal auditory canals or within the substance of the temporal bones.       Impression:      1. Small amount of fluid in the inferior mastoid air cells bilaterally.  2. There is a 2.2 x 2.4 x 2.3 cm acute infarct in the superior medial  right cerebellum within the right superior cerebellar artery territory.  There is some T2 high signal within the post PICA segment of the small  size right vertebral artery, and the post PICA segment appeared patent  on yesterday's CT angiogram. It likely has slow or turbulent flow due to  its small size. The remainder of the MRI of the brain is normal. Thin  cut images through the internal auditory canals are also normal. The  results were communicated to Keyur Becerra MD, the stroke  neurologist taking care of the patient by telephone on 07/09/2021 at  9:00 AM.     This report was finalized on 7/9/2021 11:56 AM by Dr. Curry Barrett M.D.       CT Head Without Contrast [[id number]] Collected: 07/08/21 2232     Updated: 07/09/21 0636    Narrative:      EMERGENCY NONCONTRAST HEAD CT 07/08/2021     CLINICAL HISTORY: Vertigo that is not improving.     TECHNIQUE: Spiral CT images were obtained from the base of the skull to  the vertex without intravenous contrast. Images were re-formatted and  submitted in 3 mm thick axial CT sections with brain algorithm and 2 mm  thick sagittal and coronal reconstructions were performed and submitted  in brain algorithm.     COMPARISON: There are no prior studies  from [facility] for comparison.     FINDINGS: On coronal reconstructed images 62 through 65 there is, and  also seen on axial images 18 through 20, is a 1.7 x 1.5 cm area of low  density in the superior medial right cerebellum. This is nonspecific and  could be artifact, potentially it could be a subtle acute infarct.  Otherwise the brain parenchyma is normal in attenuation. The ventricles  are normal in size. I see no mass effect, no midline shift, no  extra-axial fluid collections are identified and there is no evidence of  acute intracranial hemorrhage. There is partial opacification of the  inferior left mastoid air cells with fluid; otherwise, the paranasal  sinuses, mastoid air cells and middle ear cavities are clear. Calvarium  and skull base are normal in appearance.       Impression:      1. Small amount of fluid partially opacifies the inferior left mastoid  air cells.   2. There is a subtle area of low-density extending through the superior  medial right cerebellum that involves the white matter and extends  through the superior medial right cerebellar cortex. It measures 17 x 15  x 15 mm in size. There is a nonspecific finding potentially it could be  artifact, potentially it could be an subtle to acute superior medial  right cerebellar infarct in the right superior cerebellar artery  territory and I recommended an MRI of the brain to further evaluate. The  remainder of the head CT is normal.      My findings and recommendation for an MRI of the brain to further  evaluate the superior right cerebellum were communicated to TRUPTI Jones by telephone on 07/08/2021 at 8:55 PM     Radiation dose reduction techniques were utilized, including automated  exposure control and exposure modulation based on body size.     This report was finalized on 7/9/2021 6:33 AM by Dr. Curry Barrett M.D.       CT Angiogram Head w AI Analysis of LVO [[id number]] Collected: 07/09/21 0028     Updated:  07/09/21 0052    Narrative:      PROCEDURE: CT ANGIOGRAM HEAD W AI ANALYSIS OF LVO-, CT ANGIOGRAM NECK-     HISTORY:  Cerebellar infarcts, nausea, vomiting, dizziness.     TECHNIQUE: CT images of the brain were obtained without intravenous  contrast. Following the administration of intravenous contrast, CT  images of the head and neck were obtained. Reformatted and 3-D images  were reviewed. Radiation dose reduction techniques were utilized,  including automated exposure control and exposure modulation based on  body size. AI analysis of LVO was utilized.     COMPARISON:  CT head without contrast 07/08/2021 at 8:38 PM.     FINDINGS:     Non Contrast CT Brain:     The ventricles and sulci are within normal limits.  There is no  hydrocephalus.  No acute intracranial hemorrhage or pathologic  extra-axial collection is identified.  There is no midline shift or mass  effect.  The basal cisterns are patent. A few scattered foci of  subcortical and periventricular hypoattenuation are nonspecific but  favored to reflect mild small vessel ischemic disease. There is  redemonstration of a focal area of hypoattenuation involving the cortex  and subcortical superior right cerebellar hemisphere, measuring  approximately 2.3 x 1.6 cm, which is suspicious for possible acute  subacute infarct. There is calcific intracranial atherosclerosis. There  are trace bilateral mastoid effusions.     CT Angiography Head:     The visible internal carotid arteries, middle and anterior cerebral  arteries demonstrate normal contrast-related enhancement.  The anterior  communicating artery complex has a normal appearance. Bilateral  posterior communicating arteries are identified, right larger than left.     The left vertebral artery is dominant. The right V4 segment is diffusely  diminutive, likely developmental. There is a diminutive right P1  segment, which may be developmental. The vertebrobasilar circulation and  both posterior cerebral  arteries demonstrate normal contrast-related  enhancement. The superior cerebellar arteries are duplicated  bilaterally. The right superior cerebellar arteries are asymmetrically  diminutive and poorly opacified distally. Bilateral PICA origins are  identified.     No aneurysm or other vascular malformation is identified.       CT Angiography Neck:     There is a 3 vessel aortic arch.  The origins of the vessels arising  from the arch are patent.  There is trace calcific atherosclerosis at  the bifurcation of the brachiocephalic artery.     The common carotid arteries have a normal caliber.  The carotid bulbs  are within normal limits.  The bilateral internal carotid arteries  demonstrate no evidence of flow-limiting stenosis or occlusion. NASCET  criteria was utilized. The distal cervical left internal carotid artery  is slightly tortuous.     The vertebral arteries arise from the subclavian arteries.  The left  vertebral artery is dominant.  There is no evidence of flow-limiting  stenosis or occlusion of the vertebral arteries.     There are calcified bilateral palatine tonsilloliths. There is  centrilobular and paraseptal emphysema in the lung apices. There is mild  biapical pleuroparenchymal fibrosis, right and left. There are calcified  biapical pleural plaques, left greater than right. There is multilevel  degenerative disc disease. There is multifocal dental disease consisting  of dental caries and periapical lucencies, incompletely assessed.          Impression:         1.  Redemonstration of a small area of hypoattenuation in the superior  right cerebellar hemisphere, suspicious for an acute to subacute  infarct. This would be better assessed with MRI. The right superior  cerebellar artery is asymmetrically diminutive and poorly opacified  distally and may be occluded.  2.  Background mild small vessel ischemic disease.  3.  No acute intracranial hemorrhage.     Discussed with Rosemary, the patient's nurse, at  12:50 AM.     This report was finalized on 7/9/2021 12:49 AM by Dr. Kerrie Etienne M.D.       CT Angiogram Neck [[id number]] Collected: 07/09/21 0028     Updated: 07/09/21 0052    Narrative:      PROCEDURE: CT ANGIOGRAM HEAD W AI ANALYSIS OF LVO-, CT ANGIOGRAM NECK-     HISTORY:  Cerebellar infarcts, nausea, vomiting, dizziness.     TECHNIQUE: CT images of the brain were obtained without intravenous  contrast. Following the administration of intravenous contrast, CT  images of the head and neck were obtained. Reformatted and 3-D images  were reviewed. Radiation dose reduction techniques were utilized,  including automated exposure control and exposure modulation based on  body size. AI analysis of LVO was utilized.     COMPARISON:  CT head without contrast 07/08/2021 at 8:38 PM.     FINDINGS:     Non Contrast CT Brain:     The ventricles and sulci are within normal limits.  There is no  hydrocephalus.  No acute intracranial hemorrhage or pathologic  extra-axial collection is identified.  There is no midline shift or mass  effect.  The basal cisterns are patent. A few scattered foci of  subcortical and periventricular hypoattenuation are nonspecific but  favored to reflect mild small vessel ischemic disease. There is  redemonstration of a focal area of hypoattenuation involving the cortex  and subcortical superior right cerebellar hemisphere, measuring  approximately 2.3 x 1.6 cm, which is suspicious for possible acute  subacute infarct. There is calcific intracranial atherosclerosis. There  are trace bilateral mastoid effusions.     CT Angiography Head:     The visible internal carotid arteries, middle and anterior cerebral  arteries demonstrate normal contrast-related enhancement.  The anterior  communicating artery complex has a normal appearance. Bilateral  posterior communicating arteries are identified, right larger than left.     The left vertebral artery is dominant. The right V4 segment is  diffusely  diminutive, likely developmental. There is a diminutive right P1  segment, which may be developmental. The vertebrobasilar circulation and  both posterior cerebral arteries demonstrate normal contrast-related  enhancement. The superior cerebellar arteries are duplicated  bilaterally. The right superior cerebellar arteries are asymmetrically  diminutive and poorly opacified distally. Bilateral PICA origins are  identified.     No aneurysm or other vascular malformation is identified.       CT Angiography Neck:     There is a 3 vessel aortic arch.  The origins of the vessels arising  from the arch are patent.  There is trace calcific atherosclerosis at  the bifurcation of the brachiocephalic artery.     The common carotid arteries have a normal caliber.  The carotid bulbs  are within normal limits.  The bilateral internal carotid arteries  demonstrate no evidence of flow-limiting stenosis or occlusion. NASCET  criteria was utilized. The distal cervical left internal carotid artery  is slightly tortuous.     The vertebral arteries arise from the subclavian arteries.  The left  vertebral artery is dominant.  There is no evidence of flow-limiting  stenosis or occlusion of the vertebral arteries.     There are calcified bilateral palatine tonsilloliths. There is  centrilobular and paraseptal emphysema in the lung apices. There is mild  biapical pleuroparenchymal fibrosis, right and left. There are calcified  biapical pleural plaques, left greater than right. There is multilevel  degenerative disc disease. There is multifocal dental disease consisting  of dental caries and periapical lucencies, incompletely assessed.          Impression:         1.  Redemonstration of a small area of hypoattenuation in the superior  right cerebellar hemisphere, suspicious for an acute to subacute  infarct. This would be better assessed with MRI. The right superior  cerebellar artery is asymmetrically diminutive and poorly  opacified  distally and may be occluded.  2.  Background mild small vessel ischemic disease.  3.  No acute intracranial hemorrhage.     Discussed with Rosemary, the patient's nurse, at 12:50 AM.     This report was finalized on 7/9/2021 12:49 AM by Dr. Kerrie Etienne M.D.             Pertinent Labs     Results from last 7 days   Lab Units 07/10/21  0616 07/08/21  1710   WBC 10*3/mm3 8.69 8.38   HEMOGLOBIN g/dL 14.8 16.3   PLATELETS 10*3/mm3 169 189     Results from last 7 days   Lab Units 07/10/21  0616 07/08/21  1710   SODIUM mmol/L 140 138   POTASSIUM mmol/L 4.0 3.4*   CHLORIDE mmol/L 111* 106   CO2 mmol/L 23.3 24.5   BUN mg/dL 9 11   CREATININE mg/dL 0.99 1.06   GLUCOSE mg/dL 90 148*   Estimated Creatinine Clearance: 65.9 mL/min (by C-G formula based on SCr of 0.99 mg/dL).  Results from last 7 days   Lab Units 07/08/21  1710   ALBUMIN g/dL 4.00   BILIRUBIN mg/dL 0.5   ALK PHOS U/L 53   AST (SGOT) U/L 13   ALT (SGPT) U/L 12     Results from last 7 days   Lab Units 07/10/21  0616 07/08/21  1710   CALCIUM mg/dL 8.0* 8.4*   ALBUMIN g/dL  --  4.00       Results from last 7 days   Lab Units 07/08/21  1710   TROPONIN T ng/mL <0.010   PROBNP pg/mL 129.7       Results from last 7 days   Lab Units 07/10/21  0616   CHOLESTEROL mg/dL 136   TRIGLYCERIDES mg/dL 77   HDL CHOL mg/dL 35*   LDL CHOL mg/dL 86           Test Results Pending at Discharge       Discharge Details        Discharge Medications      New Medications      Instructions Start Date   atorvastatin 40 MG tablet  Commonly known as: LIPITOR   40 mg, Oral, Nightly         Continue These Medications      Instructions Start Date   apixaban 5 MG tablet tablet  Commonly known as: ELIQUIS   5 mg, Oral, Every 12 Hours Scheduled      clopidogrel 75 MG tablet  Commonly known as: PLAVIX   TAKE 1 TABLET BY MOUTH EVERY DAY      lisinopril 2.5 MG tablet  Commonly known as: PRINIVIL,ZESTRIL   TAKE 1 TABLET BY MOUTH EVERY DAY      metoprolol succinate XL 25 MG 24 hr tablet  Commonly  known as: TOPROL-XL   25 mg, Oral, Daily         Stop These Medications    aspirin 81 MG tablet            No Known Allergies      Discharge Disposition:  Home or Self Care    Discharge Diet:  Diet Order   Procedures   • Diet Regular       Discharge Activity:   Activity Instructions     Activity as Tolerated            CODE STATUS:    Code Status and Medical Interventions:   Ordered at: 07/09/21 0054     Code Status:    CPR     Medical Interventions (Level of Support Prior to Arrest):    Full       No future appointments.  Additional Instructions for the Follow-ups that You Need to Schedule     Call MD With Problems / Concerns   As directed      Instructions: call your primary care physician if you experience chest pain, shortness of breath, abdominal pain, nausea, vomiting, fevers, sweats, chills, or worsening of your symptoms    Order Comments: Instructions: call your primary care physician if you experience chest pain, shortness of breath, abdominal pain, nausea, vomiting, fevers, sweats, chills, or worsening of your symptoms          Discharge Follow-up with PCP   As directed       Currently Documented PCP:    Provider, No Known    PCP Phone Number:    [phone]     Follow Up Details: 1-2 weeks         Discharge Follow-up with Specialty: Cardiology, Dr. Campos 2-4 weeks or as directed   As directed      Specialty: Cardiology, Dr. Campos 2-4 weeks or as directed         Discharge Follow-up with Specified Provider: neurology 3 months or as directed   As directed      To: neurology 3 months or as directed           Follow-up Information     Cathi Zapien APRN Follow up in 3 month(s).    Specialties: Neurology, Nurse Practitioner, Emergency Medicine  Contact information:  [address] [place] [id number] [phone]             Provider, No Known .    Why: 1-2 weeks  Contact information:  [facility] [address] [phone]                   Additional Instructions for the  Follow-ups that You Need to Schedule     Call MD With Problems / Concerns   As directed      Instructions: call your primary care physician if you experience chest pain, shortness of breath, abdominal pain, nausea, vomiting, fevers, sweats, chills, or worsening of your symptoms    Order Comments: Instructions: call your primary care physician if you experience chest pain, shortness of breath, abdominal pain, nausea, vomiting, fevers, sweats, chills, or worsening of your symptoms          Discharge Follow-up with PCP   As directed       Currently Documented PCP:    Provider, No Known    PCP Phone Number:    [phone]     Follow Up Details: 1-2 weeks         Discharge Follow-up with Specialty: Cardiology, Dr. Campos 2-4 weeks or as directed   As directed      Specialty: CardiologyDr. Campos 2-4 weeks or as directed         Discharge Follow-up with Specified Provider: neurology 3 months or as directed   As directed      To: neurology 3 months or as directed           Time Spent on Discharge:  Greater than 30 minutes      Slick Romero MD  [facility]  07/11/21  12:17 EDT              Electronically signed by Slick Romero MD at 07/11/21 [id number]

## 2021-07-13 NOTE — TELEPHONE ENCOUNTER
Francheska with [facility], called to make Cathi aware that patient was evaluated yesterday. Patient is doing great so no further treatment will be needed, it was just a speech evaluation.    Clear bilaterally, pupils equal, round and reactive to light.

## 2021-07-14 ENCOUNTER — HOME CARE VISIT (OUTPATIENT)
Dept: HOME HEALTH SERVICES | Facility: HOME HEALTHCARE | Age: 61
End: 2021-07-14

## 2021-07-14 VITALS
SYSTOLIC BLOOD PRESSURE: 102 MMHG | DIASTOLIC BLOOD PRESSURE: 74 MMHG | TEMPERATURE: 97 F | RESPIRATION RATE: 18 BRPM | OXYGEN SATURATION: 94 % | HEART RATE: 62 BPM

## 2021-07-14 PROCEDURE — G0152 HHCP-SERV OF OT,EA 15 MIN: HCPCS

## 2021-07-14 NOTE — HOME HEALTH
"  Subjective:  Pt. reports that he is doing \"better, just weak\". Pt's. sister and mom present and are supportive - they live in [place], but pt. has daughter and son in law who live nearby.  Sister states that pt. had not been taking his medications and believes that is why he had CVA.  Pt. is a smoker, but has not smoked since hospitalization.      REASON FOR REFERRAL: Home health OT referral given due to impaired ADL, weakness, mobility following recent hospital stay for cerebral infarction due to embolism of right cerebellar artery.     MEDICAL HISTORY: Left ventricular apical thrombus, essential hypertension, vertigo, CKD, tobacco abuse, coronary artery disease involving native coronary artery of native heart without angina pectoris.     PLOF:  Pt. was indep. with all ADL, IADL, mobility, driving, works full time at [employer] as a .  Pt. is anxious to get back to work.      MEDICAL NECESSITY FOR ONGOING SKILLED THERAPY: Patient requires skilled occupational therapy for remediation of deficits to improve activities of daily living, home safety, falls prevention, monitor vital signs, including pulse oximetry, hand/ upper extremity function/range of motion/strength, therapeutic exercise, safety in home, and improve endurance and fatigue management with self care, and functional mobility with durable medical equipment as necessary.    Plan for next visit:  OT to further address ADL as needed, address safety with IADL tasks, BUE ther. exercises and upgrade HEP, falls prevention, energy conservation ed/training.    Pt. demonstrates good rehab potential to meet established goals.    Discharge plan is to self with family support."

## 2021-07-15 ENCOUNTER — TELEPHONE (OUTPATIENT)
Dept: NEUROLOGY | Facility: CLINIC | Age: 61
End: 2021-07-15

## 2021-07-15 ENCOUNTER — HOME CARE VISIT (OUTPATIENT)
Dept: HOME HEALTH SERVICES | Facility: HOME HEALTHCARE | Age: 61
End: 2021-07-15

## 2021-07-15 ENCOUNTER — READMISSION MANAGEMENT (OUTPATIENT)
Dept: CALL CENTER | Facility: HOSPITAL | Age: 61
End: 2021-07-15

## 2021-07-15 ENCOUNTER — OFFICE VISIT (OUTPATIENT)
Dept: INTERNAL MEDICINE | Facility: CLINIC | Age: 61
End: 2021-07-15

## 2021-07-15 VITALS
OXYGEN SATURATION: 95 % | HEART RATE: 70 BPM | DIASTOLIC BLOOD PRESSURE: 70 MMHG | TEMPERATURE: 97 F | RESPIRATION RATE: 18 BRPM | SYSTOLIC BLOOD PRESSURE: 102 MMHG

## 2021-07-15 VITALS
DIASTOLIC BLOOD PRESSURE: 58 MMHG | TEMPERATURE: 97 F | OXYGEN SATURATION: 95 % | HEART RATE: 70 BPM | SYSTOLIC BLOOD PRESSURE: 96 MMHG

## 2021-07-15 VITALS
BODY MASS INDEX: 18.28 KG/M2 | SYSTOLIC BLOOD PRESSURE: 100 MMHG | HEIGHT: 68 IN | DIASTOLIC BLOOD PRESSURE: 56 MMHG | HEART RATE: 61 BPM | WEIGHT: 120.6 LBS | OXYGEN SATURATION: 99 %

## 2021-07-15 DIAGNOSIS — R42 VERTIGO: ICD-10-CM

## 2021-07-15 DIAGNOSIS — Z12.11 COLON CANCER SCREENING: ICD-10-CM

## 2021-07-15 DIAGNOSIS — I63.9 CEREBELLAR INFARCT (HCC): ICD-10-CM

## 2021-07-15 DIAGNOSIS — I51.3 LEFT VENTRICULAR APICAL THROMBUS: Primary | ICD-10-CM

## 2021-07-15 DIAGNOSIS — I25.10 CORONARY ARTERY DISEASE INVOLVING NATIVE CORONARY ARTERY OF NATIVE HEART WITHOUT ANGINA PECTORIS: ICD-10-CM

## 2021-07-15 DIAGNOSIS — Z72.0 TOBACCO ABUSE: ICD-10-CM

## 2021-07-15 PROCEDURE — G0151 HHCP-SERV OF PT,EA 15 MIN: HCPCS

## 2021-07-15 PROCEDURE — 99214 OFFICE O/P EST MOD 30 MIN: CPT | Performed by: FAMILY MEDICINE

## 2021-07-15 PROCEDURE — G0300 HHS/HOSPICE OF LPN EA 15 MIN: HCPCS

## 2021-07-15 PROCEDURE — G0299 HHS/HOSPICE OF RN EA 15 MIN: HCPCS

## 2021-07-15 NOTE — TELEPHONE ENCOUNTER
Spoke with Kristyn with [facility] and gave a verbal for two additional visits.

## 2021-07-15 NOTE — OUTREACH NOTE
Stroke Week 1 Survey      Responses   [facility] patient discharged from?  [place]   Does the patient have one of the following disease processes/diagnoses(primary or secondary)?  Stroke (TIA)   Week 1 attempt successful?  Yes   Call start time  0831   Call end time  0835   Discharge diagnosis  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   Person spoke with today (if not patient) and relationship  Tavo-son    Meds reviewed with patient/caregiver?  Yes   Is the patient having any side effects they believe may be caused by any medication additions or changes?  No   Does the patient have all medications ordered at discharge?  Yes   Is the patient taking all medications as directed (includes completed medication regime)?  Yes   Does the patient have a primary care provider?   Yes   Does the patient have an appointment with their PCP within 7 days of discharge?  Yes   Comments regarding PCP  7/15/21 at 9:00 am with Dr. Barton   Has the patient kept scheduled appointments due by today?  Yes   What is the Home health agency?   [facility]   Has home health visited the patient within 72 hours of discharge?  Yes   What DME was ordered?  rolling walker from [place]   Has all DME been delivered?  Yes   Psychosocial issues?  No   Does the patient require any assistance with activities of daily living such as eating, bathing, dressing, walking, etc.?  No   Does the patient have any residual symptoms from stroke/TIA?  No   Did the patient receive a copy of their discharge instructions?  Yes   Nursing interventions  Reviewed instructions with patient   What is the patient's perception of their health status since discharge?  Improving   Nursing interventions  Nurse provided patient education   Is the patient able to teach back FAST for Stroke?  No [Provided education]   Is the patient/caregiver able to teach back the risk factors for a stroke?  High blood pressure-goal below 120/80, Smoking, High Cholesterol,  Sleep apnea   Is the patient/caregiver able to teach back signs and symptoms related to disease process for when to call PCP?  Yes   Is the patient/caregiver able to teach back signs and symptoms related to disease process for when to call 911?  Yes   If the patient is a current smoker, are they able to teach back resources for cessation?  Smoking cessation medications [Not smoked since 7/8/21]   Is the patient/caregiver able to teach back the hierarchy of who to call/visit for symptoms/problems? PCP, Specialist, Home health nurse, Urgent Care, ED, 911  Yes   Week 1 call completed?  Yes          Ricarda Laurent RN

## 2021-07-15 NOTE — PROGRESS NOTES
Transitional Care Follow Up Visit  Subjective     Damien Peña is a 60 y.o. male who presents for a transitional care management visit.    Within 48 business hours after discharge our office contacted him via telephone to coordinate his care and needs.      I reviewed and discussed the details of that call along with the discharge summary, hospital problems, inpatient lab results, inpatient diagnostic studies, and consultation reports with Damien.     Current outpatient and discharge medications have been reconciled for the patient.  Reviewed by: Mingo Barton MD      No flowsheet data found.  Risk for Readmission (LACE) No data recorded      History of Present Illness   Course During Hospital Stay: Patient recent hospital admission 718 for cerebellar stroke manifested with dizziness and nausea initially has some subsequently subsided but then nausea but still has some dizziness using a walking assistive devices cane or walker he is home with no other significant neurologic manifestations.  No elevated blood pressure no elevated cholesterol no family history of heart attacks he does smoke and trying to quit since discharge without any nicotine aids     The following portions of the patient's history were reviewed and updated as appropriate: allergies, current medications, past family history, past medical history, past social history, past surgical history and problem list.    Review of Systems   Constitutional: Negative for appetite change, fever and unexpected weight change.   HENT: Negative for ear pain, facial swelling and sore throat.    Eyes: Negative for pain and visual disturbance.   Respiratory: Negative for chest tightness, shortness of breath and wheezing.    Cardiovascular: Negative for chest pain and palpitations.   Gastrointestinal: Negative for abdominal pain and blood in stool.   Endocrine: Negative.    Genitourinary: Negative for difficulty urinating and hematuria.   Musculoskeletal:  Negative for joint swelling.   Neurological: Negative for tremors, seizures and syncope.   Hematological: Negative for adenopathy.   Psychiatric/Behavioral: Negative.      No cognitive social or emotional deficits status post CVA no hemiparesis or hemiplegia  Objective   Physical Exam  Constitutional:       General: He is not in acute distress.     Appearance: Normal appearance. He is normal weight.   HENT:      Head: Normocephalic and atraumatic.      Nose: Nose normal. No congestion.      Mouth/Throat:      Mouth: Mucous membranes are moist.   Eyes:      General: No scleral icterus.     Extraocular Movements: Extraocular movements intact.      Pupils: Pupils are equal, round, and reactive to light.   Cardiovascular:      Rate and Rhythm: Normal rate and regular rhythm.      Pulses: Normal pulses.      Heart sounds: Normal heart sounds.   Pulmonary:      Effort: Pulmonary effort is normal.      Breath sounds: Normal breath sounds.   Abdominal:      Tenderness: There is no right CVA tenderness or left CVA tenderness.   Musculoskeletal:         General: Normal range of motion.      Cervical back: Normal range of motion and neck supple.      Right lower leg: No edema.      Left lower leg: No edema.   Skin:     General: Skin is warm and dry.   Neurological:      Mental Status: He is alert and oriented to person, place, and time.      Coordination: Coordination abnormal.      Gait: Gait abnormal.   Psychiatric:         Mood and Affect: Mood normal.         Behavior: Behavior normal.         Thought Content: Thought content normal.         Judgment: Judgment normal.         Assessment/Plan   Diagnoses and all orders for this visit:    1. Left ventricular apical thrombus (Primary)    2. Cerebellar infarct (CMS/HCC)    3. Tobacco abuse  -     CT Chest Low Dose Wo; Future    4. Coronary artery disease involving native coronary artery of native heart without angina pectoris    5. Colon cancer screening  -     Ambulatory  Referral For Screening Colonoscopy    6. Vertigo      Follow-up results of CT scan continue present treatment for post stroke syndrome with lipid therapy anticoagulant and Plavix low-dose lisinopril  Recommend continue physical therapy  Up otherwise as needed or 3 months

## 2021-07-19 ENCOUNTER — HOME CARE VISIT (OUTPATIENT)
Dept: HOME HEALTH SERVICES | Facility: HOME HEALTHCARE | Age: 61
End: 2021-07-19

## 2021-07-19 VITALS
TEMPERATURE: 97 F | OXYGEN SATURATION: 97 % | SYSTOLIC BLOOD PRESSURE: 124 MMHG | HEART RATE: 64 BPM | DIASTOLIC BLOOD PRESSURE: 68 MMHG

## 2021-07-19 VITALS
SYSTOLIC BLOOD PRESSURE: 108 MMHG | OXYGEN SATURATION: 98 % | HEART RATE: 56 BPM | TEMPERATURE: 97.5 F | DIASTOLIC BLOOD PRESSURE: 68 MMHG

## 2021-07-19 PROCEDURE — G0151 HHCP-SERV OF PT,EA 15 MIN: HCPCS

## 2021-07-19 PROCEDURE — G0299 HHS/HOSPICE OF RN EA 15 MIN: HCPCS

## 2021-07-20 ENCOUNTER — HOME CARE VISIT (OUTPATIENT)
Dept: HOME HEALTH SERVICES | Facility: HOME HEALTHCARE | Age: 61
End: 2021-07-20

## 2021-07-20 PROCEDURE — G0152 HHCP-SERV OF OT,EA 15 MIN: HCPCS

## 2021-07-20 NOTE — HOME HEALTH
"SUBJECTIVE: Pt. reports that he is doing \"OK\".  His mom present and has been staying with him, and completing all IADL tasks.  OT encouraged pt. to begin completing simple tasks daily including laundry and simple meals.  Pt. reports that he is still not smoking, and has good appetite.     MEDICATION CHANGES: none reported     FALLS SINCE LAST VISIT: none reported     MENTAL STATUS: Pt. pleasant and cooperative; alert and oriented to person, place, time, and events.    PAIN: none reported     EDEMA: none noted     Pt. tolerated all activities well with improved performance and indep., but continues to fatigue easily with standing activities and impaired IADL.    Plan for next visit: OT to continue with established POC with focus on increasing BUE strength, standing tolerance, increase indep. with IADL. OT discussed plans for discharge following next visit and pt. in agreement.    MEDICAL NECESSITY FOR ONGOING SKILLED THERAPY: Patient requires skilled occupational therapy for remediation of deficits to improve activities of daily living, home safety, falls prevention, monitor vital signs, including pulse oximetry, hand/ upper extremity function/range of motion/strength, therapeutic exercise, safety in home, and improve endurance and fatigue management with self care, and functional mobility with durable medical equipment as necessary.   Plan for next visit: OT to continue with BUE strengthening exercises, ther. activities to increase standing hussein. and balance for increased participation in ADL tasks and simple IADL tasks, address ADL tasks - specifically bathing and dressing next visit, provide falls prevention and EC conservation strategies education"

## 2021-07-21 VITALS
DIASTOLIC BLOOD PRESSURE: 78 MMHG | HEART RATE: 65 BPM | OXYGEN SATURATION: 99 % | TEMPERATURE: 96.9 F | SYSTOLIC BLOOD PRESSURE: 108 MMHG | RESPIRATION RATE: 18 BRPM

## 2021-07-22 ENCOUNTER — HOME CARE VISIT (OUTPATIENT)
Dept: HOME HEALTH SERVICES | Facility: HOME HEALTHCARE | Age: 61
End: 2021-07-22

## 2021-07-22 VITALS
TEMPERATURE: 97.4 F | HEART RATE: 69 BPM | RESPIRATION RATE: 60 BRPM | DIASTOLIC BLOOD PRESSURE: 64 MMHG | OXYGEN SATURATION: 97 % | SYSTOLIC BLOOD PRESSURE: 112 MMHG

## 2021-07-22 VITALS
OXYGEN SATURATION: 97 % | SYSTOLIC BLOOD PRESSURE: 114 MMHG | DIASTOLIC BLOOD PRESSURE: 66 MMHG | TEMPERATURE: 96.7 F | HEART RATE: 55 BPM

## 2021-07-22 PROCEDURE — G0299 HHS/HOSPICE OF RN EA 15 MIN: HCPCS

## 2021-07-22 PROCEDURE — G0151 HHCP-SERV OF PT,EA 15 MIN: HCPCS

## 2021-07-22 PROCEDURE — G0300 HHS/HOSPICE OF LPN EA 15 MIN: HCPCS

## 2021-07-25 ENCOUNTER — HOME CARE VISIT (OUTPATIENT)
Dept: HOME HEALTH SERVICES | Facility: HOME HEALTHCARE | Age: 61
End: 2021-07-25

## 2021-07-25 VITALS
DIASTOLIC BLOOD PRESSURE: 80 MMHG | TEMPERATURE: 97.3 F | SYSTOLIC BLOOD PRESSURE: 110 MMHG | OXYGEN SATURATION: 98 % | HEART RATE: 73 BPM

## 2021-07-25 PROCEDURE — G0151 HHCP-SERV OF PT,EA 15 MIN: HCPCS

## 2021-07-25 NOTE — HOME HEALTH
Patient reports doing ok getting around. Patient reports trying to go without assistive device in the home but uses the walker in the community. Patient reports he is doing well going up and down the steps.

## 2021-07-26 ENCOUNTER — READMISSION MANAGEMENT (OUTPATIENT)
Dept: CALL CENTER | Facility: HOSPITAL | Age: 61
End: 2021-07-26

## 2021-07-26 ENCOUNTER — TELEPHONE (OUTPATIENT)
Dept: GASTROENTEROLOGY | Facility: CLINIC | Age: 61
End: 2021-07-26

## 2021-07-27 ENCOUNTER — HOME CARE VISIT (OUTPATIENT)
Dept: HOME HEALTH SERVICES | Facility: HOME HEALTHCARE | Age: 61
End: 2021-07-27

## 2021-07-27 PROCEDURE — G0152 HHCP-SERV OF OT,EA 15 MIN: HCPCS

## 2021-07-27 NOTE — PROGRESS NOTES
Date of Office Visit: 2021  Encounter Provider: Dr.Syed Campos  Place of Service: [facility] [place]  Patient Name: Damien Peña  :1960  Mingo Barton MD    Chief Complaint   Patient presents with   • Coronary Artery Disease     hospital follow up /echo   • Hypertension   • Slow Heart Rate     History of Present Illness    I am pleased to see Mr. Charlton in my office today as a follow-up.    As you know, patient is 60-year-old white gentleman whose past medical history significant for CAD, coronary artery stenting, cardiomyopathy, renal artery thromboembolism, who came today for follow-up.    In , patient was diagnosed with CAD with acute coronary artery syndrome.  Patient underwent coronary artery stenting.  Patient had similar symptoms in  and had repeat cardiac catheterization and angioplasty.  Details of these procedures are not available to me.    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  Patient underwent echocardiogram which showed EF of 45 to 50%.  Apical thrombus was noted in left ventricle.  Patient had wall motion abnormality involving apex and septum.  Patient was started on Eliquis.    In 2020, patient underwent stress test which showed large anterior and apical myocardial infarction but no ischemia was noted.  Echocardiogram showed EF of 50% and hypokinesis of mid to distal septum and apex was noted.  No apical thrombus was noted.    In 2021, patient was admitted at [facility] with symptom of TIA/stroke.  Unfortunately because of unknown reason to me, patient just stopped taking Eliquis.  Patient is high risk to form ventricular thrombus because of apical infarct and scarring.  Patient recovered from his symptoms.  Patient is restarted on Eliquis and Plavix.  Patient denies any chest pain.  No orthopnea PND  no syncope or presyncope.  No leg edema noted.    I emphasized in detail to the patient the importance of Eliquis and Plavix.  Patient is advised to be on these medicines regularly.  Compliance is emphasized.  Medications are refilled.  Patient is taken off Toprol-XL because of relative bradycardia.          Past Medical History:   Diagnosis Date   • Bradycardia    • CAD (coronary artery disease)    • Chronic kidney disease    • Hypertension    • Tobacco abuse          Past Surgical History:   Procedure Laterality Date   • APPENDECTOMY     • CARDIAC CATHETERIZATION     • COLONOSCOPY     • CORONARY STENT PLACEMENT     • ECHO - CONVERTED  2020           Current Outpatient Medications:   •  apixaban (ELIQUIS) 5 MG tablet tablet, Take 1 tablet by mouth Every 12 (Twelve) Hours for 180 days. Indications: DVT/PE (active thrombosis), Disp: 180 tablet, Rfl: 1  •  atorvastatin (LIPITOR) 40 MG tablet, Take 1 tablet by mouth Every Night for 180 days., Disp: 90 tablet, Rfl: 1  •  clopidogrel (PLAVIX) 75 MG tablet, Take 1 tablet by mouth Daily., Disp: 90 tablet, Rfl: 2  •  lisinopril (PRINIVIL,ZESTRIL) 2.5 MG tablet, Take 1 tablet by mouth Daily., Disp: 90 tablet, Rfl: 1      Social History     Socioeconomic History   • Marital status: Single     Spouse name: Not on file   • Number of children: Not on file   • Years of education: Not on file   • Highest education level: Not on file   Tobacco Use   • Smoking status: Current Every Day Smoker     Packs/day: 1.50     Years: 30.00     Pack years: 45.00     Types: Cigarettes   • Smokeless tobacco: Never Used   • Tobacco comment: [employer] [place] information provided    Vaping Use   • Vaping Use: Never used   Substance and Sexual Activity   • Alcohol use: Yes     Comment: beer occasionally   • Drug use: Never   • Sexual activity: Yes     Partners: Female         Review of Systems   Constitutional: Negative for chills and fever.   HENT: Negative for ear discharge and nosebleeds.    Eyes:  "Negative for discharge and redness.   Cardiovascular: Negative for chest pain, orthopnea, palpitations, paroxysmal nocturnal dyspnea and syncope.   Respiratory: Positive for shortness of breath. Negative for cough and wheezing.    Endocrine: Negative for heat intolerance.   Skin: Negative for rash.   Musculoskeletal: Positive for arthritis and joint pain. Negative for myalgias.   Gastrointestinal: Negative for abdominal pain, melena, nausea and vomiting.   Genitourinary: Negative for dysuria and hematuria.   Neurological: Negative for dizziness, light-headedness, numbness and tremors.   Psychiatric/Behavioral: Negative for depression. The patient is not nervous/anxious.        Procedures    Procedures    No orders to display           Objective:    /64   Pulse 60   Ht 172 cm (67.72\")   Wt 55.3 kg (122 lb)   BMI 18.71 kg/m²         Constitutional:       Appearance: Well-developed.   Eyes:      General: No scleral icterus.        Right eye: No discharge.   HENT:      Head: Normocephalic and atraumatic.   Neck:      Thyroid: No thyromegaly.      Lymphadenopathy: No cervical adenopathy.   Pulmonary:      Effort: Pulmonary effort is normal. No respiratory distress.      Breath sounds: Normal breath sounds. No wheezing. No rales.   Cardiovascular:      Normal rate. Regular rhythm.      No gallop.   Abdominal:      Tenderness: There is no abdominal tenderness.   Skin:     Findings: No erythema or rash.   Neurological:      Mental Status: Alert and oriented to person, place, and time.             Assessment:       Diagnosis Plan   1. Coronary artery disease involving native coronary artery of native heart without angina pectoris  apixaban (ELIQUIS) 5 MG tablet tablet    atorvastatin (LIPITOR) 40 MG tablet    lisinopril (PRINIVIL,ZESTRIL) 2.5 MG tablet    clopidogrel (PLAVIX) 75 MG tablet   2. Bradycardia, sinus  apixaban (ELIQUIS) 5 MG tablet tablet    atorvastatin (LIPITOR) 40 MG tablet    lisinopril " (PRINIVIL,ZESTRIL) 2.5 MG tablet    clopidogrel (PLAVIX) 75 MG tablet   3. Essential hypertension  apixaban (ELIQUIS) 5 MG tablet tablet    atorvastatin (LIPITOR) 40 MG tablet    lisinopril (PRINIVIL,ZESTRIL) 2.5 MG tablet    clopidogrel (PLAVIX) 75 MG tablet   4. Coronary artery disease of native artery of native heart with stable angina pectoris (CMS/HCC)  apixaban (ELIQUIS) 5 MG tablet tablet    atorvastatin (LIPITOR) 40 MG tablet    lisinopril (PRINIVIL,ZESTRIL) 2.5 MG tablet    clopidogrel (PLAVIX) 75 MG tablet   5. Non-ischemic cardiomyopathy (CMS/HCC)  apixaban (ELIQUIS) 5 MG tablet tablet    atorvastatin (LIPITOR) 40 MG tablet    lisinopril (PRINIVIL,ZESTRIL) 2.5 MG tablet    clopidogrel (PLAVIX) 75 MG tablet   6. Renal artery thrombosis (CMS/HCC)  apixaban (ELIQUIS) 5 MG tablet tablet    atorvastatin (LIPITOR) 40 MG tablet    lisinopril (PRINIVIL,ZESTRIL) 2.5 MG tablet    clopidogrel (PLAVIX) 75 MG tablet            Plan:       MDM:    1.  CAD:    Clinically patient is not having any symptom of angina pectoris.  Continue current treatment    2.  Dilated ischemic cardiomyopathy:    Patient could not tolerate Toprol-XL because of relative bradycardia.  Patient is on lisinopril.  Continue that.  Recent echocardiogram showed EF of 45 to 50%.  Apical thrombus was noted    3.  Hypertension:    Continue current treatment blood pressure is controlled    4.  Sinus bradycardia:    Patient could not tolerate beta-blocker because of relative bradycardia

## 2021-07-28 ENCOUNTER — OFFICE VISIT (OUTPATIENT)
Dept: CARDIOLOGY | Facility: CLINIC | Age: 61
End: 2021-07-28

## 2021-07-28 ENCOUNTER — READMISSION MANAGEMENT (OUTPATIENT)
Dept: CALL CENTER | Facility: HOSPITAL | Age: 61
End: 2021-07-28

## 2021-07-28 ENCOUNTER — HOME CARE VISIT (OUTPATIENT)
Dept: HOME HEALTH SERVICES | Facility: HOME HEALTHCARE | Age: 61
End: 2021-07-28

## 2021-07-28 VITALS
SYSTOLIC BLOOD PRESSURE: 102 MMHG | DIASTOLIC BLOOD PRESSURE: 70 MMHG | OXYGEN SATURATION: 98 % | RESPIRATION RATE: 18 BRPM | TEMPERATURE: 96.9 F | HEART RATE: 60 BPM

## 2021-07-28 VITALS
SYSTOLIC BLOOD PRESSURE: 112 MMHG | DIASTOLIC BLOOD PRESSURE: 64 MMHG | HEART RATE: 60 BPM | HEIGHT: 68 IN | BODY MASS INDEX: 18.49 KG/M2 | WEIGHT: 122 LBS

## 2021-07-28 DIAGNOSIS — N28.0 RENAL ARTERY THROMBOSIS (HCC): ICD-10-CM

## 2021-07-28 DIAGNOSIS — I25.10 CORONARY ARTERY DISEASE INVOLVING NATIVE CORONARY ARTERY OF NATIVE HEART WITHOUT ANGINA PECTORIS: Primary | ICD-10-CM

## 2021-07-28 DIAGNOSIS — I42.8 NON-ISCHEMIC CARDIOMYOPATHY (HCC): ICD-10-CM

## 2021-07-28 DIAGNOSIS — R00.1 BRADYCARDIA, SINUS: ICD-10-CM

## 2021-07-28 DIAGNOSIS — I25.118 CORONARY ARTERY DISEASE OF NATIVE ARTERY OF NATIVE HEART WITH STABLE ANGINA PECTORIS (HCC): ICD-10-CM

## 2021-07-28 DIAGNOSIS — I10 ESSENTIAL HYPERTENSION: ICD-10-CM

## 2021-07-28 PROCEDURE — 99214 OFFICE O/P EST MOD 30 MIN: CPT | Performed by: INTERNAL MEDICINE

## 2021-07-28 PROCEDURE — G0493 RN CARE EA 15 MIN HH/HOSPICE: HCPCS

## 2021-07-28 RX ORDER — LISINOPRIL 2.5 MG/1
2.5 TABLET ORAL DAILY
Qty: 90 TABLET | Refills: 1 | Status: SHIPPED | OUTPATIENT
Start: 2021-07-28 | End: 2022-02-17

## 2021-07-28 RX ORDER — CLOPIDOGREL BISULFATE 75 MG/1
75 TABLET ORAL DAILY
Qty: 90 TABLET | Refills: 2 | Status: SHIPPED | OUTPATIENT
Start: 2021-07-28

## 2021-07-28 RX ORDER — ATORVASTATIN CALCIUM 40 MG/1
40 TABLET, FILM COATED ORAL NIGHTLY
Qty: 90 TABLET | Refills: 1 | Status: SHIPPED | OUTPATIENT
Start: 2021-07-28 | End: 2022-01-11

## 2021-07-28 NOTE — HOME HEALTH
"SUBJECTIVE: Pt. reports that he is doing \"OK\".  He reports that he has been staying by himself and completing simple tasks daily including laundry and simple meals. His sister will be visiting for a few days to take him to 2 MD appt's.  Pt. is anxious to get back to driving - will discuss with neurologist next week.  Pt. reports that he is still not smoking, and has good appetite.     MEDICATION CHANGES: none reported     FALLS SINCE LAST VISIT: none reported     MENTAL STATUS: Pt. pleasant and cooperative; alert and oriented to person, place, time, and events.    PAIN: none reported     EDEMA: none noted     Pt. tolerated all activities well with improved performance and indepndence..  Pt. demonstrating indep. with all ADL, simple meal prep., laundry, and simple household tasks.  Pt. demonstrates indep. with BUE HEP and OT provided further ed/training with [facility] activities to complete for right hand.  No further OT services indicated at this time.  Therefore, OT services discharged this date with pt. in agreement.    Discharge plan to self with good family support."

## 2021-07-28 NOTE — OUTREACH NOTE
Medical Week 2 Survey      Responses   [facility] patient discharged from?  [place]   Does the patient have one of the following disease processes/diagnoses(primary or secondary)?  Stroke (TIA)   Call start time  1713   Call end time  1717   Meds reviewed with patient/caregiver?  Yes   Is the patient taking all medications as directed (includes completed medication regime)?  Yes   Medication comments  has been started on Eliquis, Plavix and lisonpril   Has the patient kept scheduled appointments due by today?  Yes   Comments  has kept appointment as scheduled   What is the patient's perception of their health status since discharge?  Improving   Wrap up additional comments  not using a cane or walker          Cynthia Helms RN

## 2021-07-29 ENCOUNTER — HOME CARE VISIT (OUTPATIENT)
Dept: HOME HEALTH SERVICES | Facility: HOME HEALTHCARE | Age: 61
End: 2021-07-29

## 2021-07-29 VITALS
TEMPERATURE: 96.9 F | RESPIRATION RATE: 16 BRPM | DIASTOLIC BLOOD PRESSURE: 60 MMHG | HEART RATE: 67 BPM | OXYGEN SATURATION: 98 % | SYSTOLIC BLOOD PRESSURE: 100 MMHG

## 2021-07-29 VITALS
TEMPERATURE: 97.2 F | DIASTOLIC BLOOD PRESSURE: 80 MMHG | HEART RATE: 58 BPM | SYSTOLIC BLOOD PRESSURE: 120 MMHG | OXYGEN SATURATION: 98 %

## 2021-07-29 PROCEDURE — G0155 HHCP-SVS OF CSW,EA 15 MIN: HCPCS

## 2021-07-29 PROCEDURE — G0151 HHCP-SERV OF PT,EA 15 MIN: HCPCS

## 2021-07-29 NOTE — HOME HEALTH
MSW eval on this date with patient. Patient lives alone in well maintained second floor apartment. Patient denies any issues with steps following stroke. Patient was working full time prior and is currently waiting on short term disability. Patient's sister May who lives in [place] has been helping with transportation and business aspect until patient recovers. She has started paperwork. Left VM with May to find out if assistance could be provided. Patient reports that bills are currently paid. Provided patient with community resource [facility] for financial or food assistance as needed.  Patient's daughter and son in law have also assisted however both work full time with small children but are involved and supportive. Set patient up with [employer] for appt tomorrow at [facility] at 9:30am. Patient received text to confirm. Patient agreeable to contact MSW with any additional questions or concerns.

## 2021-07-30 ENCOUNTER — HOSPITAL ENCOUNTER (OUTPATIENT)
Dept: CT IMAGING | Facility: HOSPITAL | Age: 61
Discharge: HOME | End: 2021-07-30
Admitting: FAMILY MEDICINE

## 2021-07-30 DIAGNOSIS — Z72.0 TOBACCO ABUSE: ICD-10-CM

## 2021-07-30 PROCEDURE — 71271 CT THORAX LUNG CANCER SCR C-: CPT

## 2021-08-02 DIAGNOSIS — J44.0 COPD WITH LOWER RESPIRATORY INFECTION (HCC): Primary | ICD-10-CM

## 2021-08-05 ENCOUNTER — HOME CARE VISIT (OUTPATIENT)
Dept: HOME HEALTH SERVICES | Facility: HOME HEALTHCARE | Age: 61
End: 2021-08-05

## 2021-08-05 PROCEDURE — G0162 HHC RN E&M PLAN SVS, 15 MIN: HCPCS

## 2021-08-06 ENCOUNTER — READMISSION MANAGEMENT (OUTPATIENT)
Dept: CALL CENTER | Facility: HOSPITAL | Age: 61
End: 2021-08-06

## 2021-08-06 VITALS
OXYGEN SATURATION: 98 % | TEMPERATURE: 97.5 F | SYSTOLIC BLOOD PRESSURE: 104 MMHG | RESPIRATION RATE: 16 BRPM | HEART RATE: 57 BPM | DIASTOLIC BLOOD PRESSURE: 60 MMHG

## 2021-08-06 NOTE — OUTREACH NOTE
Stroke Week 3 Survey      Responses   [facility] patient discharged from?  [facility]   Does the patient have one of the following disease processes/diagnoses(primary or secondary)?  Stroke (TIA)   Week 3 attempt successful?  Yes   Call start time  1435   Call end time  1459   Discharge diagnosis  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   Meds reviewed with patient/caregiver?  Yes   Is the patient having any side effects they believe may be caused by any medication additions or changes?  No   Does the patient have all medications ordered at discharge?  Yes   Is the patient taking all medications as directed (includes completed medication regime)?  Yes   Does the patient have a primary care provider?   Yes   Does the patient have an appointment with their PCP within 7 days of discharge?  Yes   Has the patient kept scheduled appointments due by today?  Yes   What is the Home health agency?   [facility]   Has home health visited the patient within 72 hours of discharge?  Yes   What DME was ordered?  He is walking without the walker.    Has all DME been delivered?  Yes   Psychosocial issues?  Yes   Does the patient require any assistance with activities of daily living such as eating, bathing, dressing, walking, etc.?  No   Does the patient have any residual symptoms from stroke/TIA?  Yes   Does the patient understand the diet ordered at discharge?  Yes [weakness of the right arm. ]   Did the patient receive a copy of their discharge instructions?  Yes   Nursing interventions  Reviewed instructions with patient   What is the patient's perception of their health status since discharge?  Improving   Nursing interventions  Nurse provided patient education   Is the patient able to teach back FAST for Stroke?  Yes   Is the patient/caregiver able to teach back the risk factors for a stroke?  High Cholesterol   Is the patient/caregiver able to teach back signs and symptoms related to disease process for  when to call PCP?  Yes   Is the patient/caregiver able to teach back signs and symptoms related to disease process for when to call 911?  Yes   If the patient is a current smoker, are they able to teach back resources for cessation?  Not a smoker   Is the patient/caregiver able to teach back the hierarchy of who to call/visit for symptoms/problems? PCP, Specialist, Home health nurse, Urgent Care, ED, 911  Yes   Week 3 call completed?  Yes   Wrap up additional comments  Using a cane          Mei Hernandez RN

## 2021-08-12 ENCOUNTER — OFFICE VISIT (OUTPATIENT)
Dept: NEUROLOGY | Facility: CLINIC | Age: 61
End: 2021-08-12

## 2021-08-12 VITALS
OXYGEN SATURATION: 97 % | SYSTOLIC BLOOD PRESSURE: 104 MMHG | BODY MASS INDEX: 18.94 KG/M2 | WEIGHT: 125 LBS | DIASTOLIC BLOOD PRESSURE: 74 MMHG | HEART RATE: 67 BPM | HEIGHT: 68 IN

## 2021-08-12 DIAGNOSIS — Z87.891 SMOKING HISTORY: ICD-10-CM

## 2021-08-12 DIAGNOSIS — I63.9 CEREBELLAR INFARCT (HCC): Primary | ICD-10-CM

## 2021-08-12 DIAGNOSIS — I10 ESSENTIAL HYPERTENSION: ICD-10-CM

## 2021-08-12 DIAGNOSIS — I25.10 CORONARY ARTERY DISEASE INVOLVING NATIVE CORONARY ARTERY OF NATIVE HEART WITHOUT ANGINA PECTORIS: ICD-10-CM

## 2021-08-12 PROCEDURE — 99214 OFFICE O/P EST MOD 30 MIN: CPT | Performed by: NURSE PRACTITIONER

## 2021-08-12 NOTE — PROGRESS NOTES
"DOS: 2021  NAME: Damien Peña   : 1960  PCP: Mingo Barton MD    Chief Complaint   Patient presents with   • Stroke      Patient is accompanied by his sister who provides some portion of medical history.    Neurological Problem and Interval History:  60 y.o. left-handed male with a Hx of Hypertension, apical thrombus, CAD, prior tobacco abuse, sinus bradycardia who I am seeing today in follow-up for right cerebellar infarct from 2021 where patient presented with complaint of room spinning vertigo with associated vomiting.  Initial CT the head revealed subtle area of low density in right cerebellum thought to be reflective of acute infarct.  No evidence of mass or hemorrhage seen on that scan.  Vessel imaging/CTA head and neck again redemonstrated area of hypoattenuation in the superior right cerebellar hemisphere, evidence of mild small vessel ischemic disease, no evidence of dissection/aneurysm and/or occlusion noted.  MRI of the brain later confirmed right cerebellar infarct.  TTE showed EF 46 to 50%.  LV normal.  Small flat solid thrombus noted left ventricle of the apex; thrombus was felt to be fixed.  Patient was discharged home on Eliquis 5 mg twice daily, Plavix 75 mg daily and atorvastatin 40 mg daily which she remains on today.  During this admission patient reported medication noncompliance for no specific reason; cost was not effective according to patient.    Since discharge, he denies any new signs and/or symptoms of stroke he continues to feel decreased stamina/fatigue.  He has not resumed driving; awaiting clearance from our service.  He remains off work; worked at [employer] \"on the run my\".  He is anxious to get back to work; PCP recommended patient being off to  which I am in agreement with; I will reevaluate first week of October to determine if patient can be cleared for full-time duties.  He denies any new complaints and or concerns.  He reports his systolic blood " "pressure is consistently less than 140 and diastolic is consistently less than 90; today 104/74.  Sister states that his mood is changed since CVA; \"shorter fuse\" which patient agrees with but he attributes this to being \"stuck in the house\" as he has not driven since CVA.  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  He often feels \"not rested\" but states \"I never used to sleep\".  PRINCESS evaluation offered to which patient declined; will reevaluate again at next appointment.  He denies any falls/illnesses/hospitalizations.  As stated above, I will see patient back first week of October and make a decision regarding work release at that time.      Review of Systems:        Review of Systems   Constitutional: Negative for activity change, appetite change and unexpected weight change.   HENT: Negative for facial swelling, trouble swallowing and voice change.    Eyes: Negative for photophobia, pain and visual disturbance.   Respiratory: Positive for shortness of breath (with exercise). Negative for chest tightness and wheezing.    Cardiovascular: Negative for chest pain, palpitations and leg swelling.   Gastrointestinal: Negative for abdominal pain, nausea and vomiting.   Endocrine: Negative for cold intolerance and heat intolerance.   Musculoskeletal: Positive for arthralgias (right shoulder). Negative for back pain, gait problem, joint swelling, myalgias, neck pain and neck stiffness.   Neurological: Positive for speech difficulty (word-finding, stutter), weakness (right arm) and light-headedness (after exercising). Negative for dizziness, tremors, seizures, syncope, facial asymmetry, numbness and headaches.   Hematological: Does not bruise/bleed easily.   Psychiatric/Behavioral: Positive for agitation. Negative for behavioral problems, confusion, decreased concentration, dysphoric mood, hallucinations, self-injury, sleep disturbance and suicidal ideas. The patient " "is not nervous/anxious and is not hyperactive.          Current Outpatient Medications:   •  apixaban (ELIQUIS) 5 MG tablet tablet, Take 1 tablet by mouth Every 12 (Twelve) Hours for 180 days. Indications: DVT/PE (active thrombosis), Disp: 180 tablet, Rfl: 1  •  atorvastatin (LIPITOR) 40 MG tablet, Take 1 tablet by mouth Every Night for 180 days., Disp: 90 tablet, Rfl: 1  •  clopidogrel (PLAVIX) 75 MG tablet, Take 1 tablet by mouth Daily., Disp: 90 tablet, Rfl: 2  •  lisinopril (PRINIVIL,ZESTRIL) 2.5 MG tablet, Take 1 tablet by mouth Daily., Disp: 90 tablet, Rfl: 1    \"The following portions of the patient's history were reviewed and updated as appropriate: allergies, current medications, past family history, past medical history, past social history, past surgical history and problem list.\"  Review and Interpretation of Imaging:  G discussed above reviewed  Laboratory Results:             Lab Results   Component Value Date    HGBA1C 5.20 07/10/2021         Lab Results   Component Value Date    CHOL 136 07/10/2021         Lab Results   Component Value Date    HDL 35 (L) 07/10/2021         Lab Results   Component Value Date    LDL 86 07/10/2021         Lab Results   Component Value Date    TRIG 77 07/10/2021     No results found for: RPR  Lab Results   Component Value Date    TSH 2.390 07/10/2021     Lab Results   Component Value Date    [id number] 339 07/10/2021       Physical Examination: NIHSS: 0 mRS: 0; patient not driving-awaiting clearance from our service  General Appearance:   Well developed, well nourished, well groomed, alert, and cooperative.  HEENT: Normocephalic.  Normal fundoscopic exam including normal retina, discs are flat with sharp margins, normal vasculature.  Neck and Spine: Normal range of motion.  Normal alignment. No mass or tenderness. No bruits.  Cardiac: Regular rate and rhythm. No murmurs.  Peripheral Vasculature: Radial and pedal pulses are equal and symmetric. No     signs of distal " embolization.  Extremities:    No edema or deformities. Normal joint ROM.  Skin:    No rashes or birth marks.    Neurological examination:  Higher Integrative  Function: Oriented to time, place and person. Normal registration, recall, attention span and concentration. Normal language including comprehension, spontaneous speech, repetition, reading, writing, naming and vocabulary. No neglect with normal visual-spatial function and construction. Normal fund of knowledge and higher integrative function.  CN II: Pupils are equal, round, and reactive to light. Normal visual acuity and visual fields.    CN III IV VI: Extraocular movements are full without nystagmus.   CN V: Normal facial sensation and strength of muscles of mastication.  CN VII: Facial movements are symmetric. No weakness.  CN VIII:   Auditory acuity is normal.  CN IX & X:   Symmetric palatal movement.  CN XI: Sternocleidomastoid and trapezius are normal.  No weakness.  CN XII:   The tongue is midline.  No atrophy or fasciculations.  Motor: Normal muscle strength, bulk and tone in upper and lower extremities.  No fasciculations, rigidity, spasticity, or abnormal movements.  Reflexes: Plantar responses are flexor.  Sensation: Normal to light touch in arms and legs.  Station and Gait: Normal gait and station.    Coordination:  Finger to nose test shows no dysmetria.        Diagnoses:    1.  Right cerebellar stroke (July 2021)  2.  History of apical thrombus; not on anticoagulation due to medication noncompliance just prior to the event  3.  Hypertension: On low-dose lisinopril  4.  CAD  5.  History of tobacco abuse; quit smoking since CVA  6.  At risk for obstructive sleep apnea    Plan:   Follow-up with ophthalmology prior to resuming driving; if cleared by  ophthalmology no deficits from neurology perspective to hinder him from  Driving   Continue Eliquis 5 mg twice daily, Plavix 75 mg daily and atorvastatin 40 mg  Daily   Recommend being off work till  October 8 per PCP recommendations    Consider PRINCESS in the future; at moderate risk for obstructive sleep apnea   Blood pressure control to <130/80   Goal LDL <70-recommend high dose statins-    Serum glucose < 140   Call 911 for stroke any stroke symptoms   Follow-up with me first week of October  Diagnoses and all orders for this visit:    1. Cerebellar infarct (CMS/HCC) (Primary)    2. Essential hypertension    3. Coronary artery disease involving native coronary artery of native heart without angina pectoris    4. Smoking history

## 2021-08-13 ENCOUNTER — TELEPHONE (OUTPATIENT)
Dept: CARDIOLOGY | Facility: CLINIC | Age: 61
End: 2021-08-13

## 2021-08-13 DIAGNOSIS — R93.89 ABNORMAL CT OF THE CHEST: Primary | ICD-10-CM

## 2021-08-13 NOTE — TELEPHONE ENCOUNTER
Kristyn with Dr.Vincent Marquis office calling  would like you to review the patients CT of the chest done 7/31 Results are in patients chart

## 2021-08-16 ENCOUNTER — TELEPHONE (OUTPATIENT)
Dept: INTERNAL MEDICINE | Facility: CLINIC | Age: 61
End: 2021-08-16

## 2021-08-27 ENCOUNTER — TELEPHONE (OUTPATIENT)
Dept: INTERNAL MEDICINE | Facility: CLINIC | Age: 61
End: 2021-08-27

## 2021-08-27 NOTE — TELEPHONE ENCOUNTER
Patient called back today stating he hasnt heard anything yet if we could please call him again? His sister will make sure he has his phone on.   Best contact number [phone]  Thanks

## 2021-08-31 ENCOUNTER — TELEPHONE (OUTPATIENT)
Dept: GASTROENTEROLOGY | Facility: CLINIC | Age: 61
End: 2021-08-31

## 2021-08-31 NOTE — TELEPHONE ENCOUNTER
Dr. Barton (PCP) at [phone], Ordered CT of chest.       Patient was referred to Pulmonary, see below.       Communication Records is currently read-only.     Date/Time Communication type Communication outcome Contact type Name Number By              08/03/2021 02:45:26 PM Fax (Outgoing)     Tom Talamantes    Faxed referral intake form and req. documents to [facility] @ [phone]. Office will contact patient to schedule and fax appointment details back to office. Sent patient contact information for appointment follow up.             Per Dr Campos Patient would need to redo CT chest in future as Aneurysm is 4.2, he was concerned if Pt had seen Pulmonary due to lung issues seen on CT chest.       Spoke to patient gave info regarding Pulmonology and informed him to keep all other appointments with Dr. Campos in Jan to discuss repeat CT Chest.

## 2021-10-06 ENCOUNTER — TELEPHONE (OUTPATIENT)
Dept: NEUROLOGY | Facility: CLINIC | Age: 61
End: 2021-10-06

## 2021-10-06 ENCOUNTER — TELEPHONE (OUTPATIENT)
Dept: INTERNAL MEDICINE | Facility: CLINIC | Age: 61
End: 2021-10-06

## 2021-10-06 ENCOUNTER — OFFICE VISIT (OUTPATIENT)
Dept: NEUROLOGY | Facility: CLINIC | Age: 61
End: 2021-10-06

## 2021-10-06 VITALS
HEART RATE: 63 BPM | DIASTOLIC BLOOD PRESSURE: 82 MMHG | HEIGHT: 68 IN | WEIGHT: 128 LBS | OXYGEN SATURATION: 96 % | BODY MASS INDEX: 19.4 KG/M2 | SYSTOLIC BLOOD PRESSURE: 136 MMHG

## 2021-10-06 DIAGNOSIS — Z87.891 HISTORY OF SMOKING: ICD-10-CM

## 2021-10-06 DIAGNOSIS — I63.9 CEREBELLAR INFARCT (HCC): Primary | ICD-10-CM

## 2021-10-06 DIAGNOSIS — Z91.89 AT RISK FOR OBSTRUCTIVE SLEEP APNEA: ICD-10-CM

## 2021-10-06 DIAGNOSIS — I51.3 LEFT VENTRICULAR APICAL THROMBUS: ICD-10-CM

## 2021-10-06 DIAGNOSIS — I25.10 CORONARY ARTERY DISEASE INVOLVING NATIVE CORONARY ARTERY OF NATIVE HEART WITHOUT ANGINA PECTORIS: ICD-10-CM

## 2021-10-06 PROCEDURE — 99214 OFFICE O/P EST MOD 30 MIN: CPT | Performed by: NURSE PRACTITIONER

## 2021-10-06 NOTE — TELEPHONE ENCOUNTER
Spoke with patient regarding the return to work letter.  Per his request, letter will be mailed to him.

## 2021-10-06 NOTE — TELEPHONE ENCOUNTER
Caller: Damien Peña    Relationship: Self    Best call back number: [phone] (H)    What is the best time to reach you: ANYTIME    Who are you requesting to speak with (clinical staff, provider,  specific staff member): CLINICAL STAFF    What was the call regarding: PATIENT CALLING IN REGARDS TO STATING DR ROMAN WOULD LIKE PATIENT TO HAVE A COLONOSCOPY PATIENT STATES HE WILL NEED A MEDICAL RELEASE FORM FOR HIS CARDIOLOGIST TO BE OFF MEDICATION FOR A CLONOSOPY    Do you require a callback: YES        CARDIOLOGIST DR BUCKLEY

## 2021-10-06 NOTE — PROGRESS NOTES
DOS: 10/6/2021  NAME: Damien Peña   : 1960  PCP: Mingo Barton MD    Chief Complaint   Patient presents with   • Stroke   He is accompanied by son-in-law who assists with providing some portion of medical history.      Neurological Problem and Interval History:  61 y.o. left hand male with a Hx of Hypertension, febrile seizures (as a child), apical thrombus, CAD, tobacco abuse (quit since CVA 2021), bradycardia who I am seeing today in follow-up for right cerebellar infarct from 2021 where patient presented with complaint of room spinning vertigo with associated vomiting.     Patient was last seen in follow-up by me 2021.  At that time he denied any new signs and/or symptoms of stroke but continued to report decreased stamina/fatigue.  He had not yet resumed driving was awaiting clearance from our service.  He remained off work and was anxious to get back to working full-time.  Recommended the patient stay off through  per PCP recommendations; patient here today requesting clearance to return to work.    Patient remains on Eliquis 5 mg twice daily, Plavix 75 mg daily, atorvastatin 40 mg daily per neurology recommendations.  Neurologically, patient at baseline although continues to report some mild decrease in stamina/fatigue which she somewhat attributes to poor quality of sleep as he previously worked night shift and continues to have his days and nights mixed up.  He denies any issues with blood pressures; BP today 136/82.  He is on lisinopril 2.5 mg daily.  He denies any issues with mood specifically no concern for PBA and/or depression.  He reports continued poor quality of sleep and according to son-in-law he naps frequently throughout the day.  Patient reports that he gets about 2 to 3 hours of sleep per night.  Stop bang screening tool puts him at moderate risk and given his history of long-term tobacco abuse will refer him to sleep medicine to further evaluate for  PRINCESS.  Patient does not use any assistive devices although does have bilateral hearing aids and glasses.  He continues to not smoke; quit smoking since July when he had CVA.  He denies any other complaints and or concerns; anxious to return to work.  Based on his exam today I find no barriers to him being able to complete his duties he has no specific unilateral weakness and/or visual deficits although I would still like patient to follow-up with ophthalmology for further visual field clearance.  No change in medication regimen recommended at this time.       Review of Systems:        Review of Systems   Constitutional: Negative for activity change, appetite change and chills.   HENT: Negative for congestion, ear discharge and ear pain.    Eyes: Negative for pain, discharge and itching.   Respiratory: Negative for cough, choking and chest tightness.    Cardiovascular: Negative for palpitations and leg swelling.   Gastrointestinal: Negative for abdominal pain, nausea and vomiting.   Endocrine: Positive for cold intolerance. Negative for heat intolerance.   Musculoskeletal: Negative for back pain, joint swelling and neck pain.   Allergic/Immunologic: Negative for environmental allergies and food allergies.   Neurological: Positive for weakness. Negative for dizziness, tremors, seizures, syncope, facial asymmetry, speech difficulty, light-headedness, numbness and headaches.   Psychiatric/Behavioral: Negative for agitation, behavioral problems, confusion, decreased concentration, dysphoric mood, hallucinations, self-injury, sleep disturbance and suicidal ideas. The patient is not nervous/anxious and is not hyperactive.          Current Outpatient Medications:   •  apixaban (ELIQUIS) 5 MG tablet tablet, Take 1 tablet by mouth Every 12 (Twelve) Hours for 180 days. Indications: DVT/PE (active thrombosis), Disp: 180 tablet, Rfl: 1  •  atorvastatin (LIPITOR) 40 MG tablet, Take 1 tablet by mouth Every Night for 180 days.,  "Disp: 90 tablet, Rfl: 1  •  clopidogrel (PLAVIX) 75 MG tablet, Take 1 tablet by mouth Daily., Disp: 90 tablet, Rfl: 2  •  lisinopril (PRINIVIL,ZESTRIL) 2.5 MG tablet, Take 1 tablet by mouth Daily., Disp: 90 tablet, Rfl: 1    \"The following portions of the patient's history were reviewed and updated as appropriate: allergies, current medications, past family history, past medical history, past social history, past surgical history and problem list.\"  Review and Interpretation of Imaging:  No new imaging reviewed today  Laboratory Results:             Lab Results   Component Value Date    HGBA1C 5.20 07/10/2021         Lab Results   Component Value Date    CHOL 136 07/10/2021         Lab Results   Component Value Date    HDL 35 (L) 07/10/2021         Lab Results   Component Value Date    LDL 86 07/10/2021         Lab Results   Component Value Date    TRIG 77 07/10/2021     No results found for: RPR  Lab Results   Component Value Date    TSH 2.390 07/10/2021     Lab Results   Component Value Date    [id number] 339 07/10/2021       Physical Examination: NIHSS: 0 mRS: 0  General Appearance:   Well developed, well nourished, well groomed, alert, and cooperative.  HEENT: Normocephalic.   Neck and Spine: Normal range of motion.  Normal alignment. No mass or tenderness. No bruits.  Cardiac: Regular rate and rhythm. No murmurs.  Peripheral Vasculature: Radial and pedal pulses are equal and symmetric. No     signs of distal embolization.  Extremities:    No edema or deformities. Normal joint ROM.  Skin:    No rashes or birth marks.    Neurological examination:  Higher Integrative  Function: Oriented to time, place and person. Normal registration, recall, attention span and concentration. Normal language including comprehension, spontaneous speech, repetition, reading, writing, naming and vocabulary. No neglect with normal visual-spatial function and construction. Normal fund of knowledge and higher integrative function.  CN " II: Pupils are equal, round, and reactive to light. Normal visual acuity and visual fields.    CN III IV VI: Extraocular movements are full without nystagmus.   CN V: Normal facial sensation and strength of muscles of mastication.  CN VII: Facial movements are symmetric. No weakness.  CN VIII:   Auditory acuity is normal.  CN IX & X:   Symmetric palatal movement.  CN XI: Sternocleidomastoid and trapezius are normal.  No weakness.  CN XII:   The tongue is midline.  No atrophy or fasciculations.  Motor: Normal muscle strength, bulk and tone in upper and lower extremities.  No fasciculations, rigidity, spasticity, or abnormal movements.  Reflexes: Plantar responses are flexor.  Sensation: Normal to light touch in arms and legs.   Station and Gait: Normal gait and station.    Coordination:  Finger to nose test shows no dysmetria.          Diagnoses:    1.  Right cerebellar stroke (July 2021)  2.  History of apical thrombus; not on anticoagulation due to medication noncompliance just prior to the event-now compliant with medication  3.  Hypertension: On low-dose lisinopril  4.  CAD  5.  History of tobacco abuse; quit smoking since CVA  6.  At risk for obstructive sleep apnea      Plan:   Continue Eliquis 5 mg twice daily, atorvastatin 40 mg daily, Plavix 75 mg daily   Referral to sleep medicine for outpatient PRINCESS evaluation determination   Follow-up with ophthalmology although no visual deficits appreciated on my  exam   Okay from neurology perspective to return to work unless other  specifications/limitations provided by ophthalmology   Blood pressure control to <130/80   Goal LDL <70-recommend high dose statins-    Serum glucose < 140   Call 911 for stroke any stroke symptoms   Follow-up annually/as needed    Diagnoses and all orders for this visit:    1. Cerebellar infarct (HCC) (Primary)    2. At risk for obstructive sleep apnea  -     Ambulatory Referral to Sleep Medicine    3. History of smoking    4. Coronary  artery disease involving native coronary artery of native heart without angina pectoris    5. Left ventricular apical thrombus

## 2021-10-07 NOTE — TELEPHONE ENCOUNTER
Called patient to notify him that the provider performing his colonoscopy will request the cardiac clearance from Dr. Campos since that who prescribes his blood thinner. Provided patient with number to call Gastro to schedule (referral was marked that he needed to call). Patient expressed understanding.

## 2021-10-13 ENCOUNTER — TELEPHONE (OUTPATIENT)
Dept: CARDIOLOGY | Facility: CLINIC | Age: 61
End: 2021-10-13

## 2021-10-13 NOTE — TELEPHONE ENCOUNTER
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  Last OV was in July.   How Severe Are They?: moderate Is This A New Presentation, Or A Follow-Up?: Actinic Keratosis

## 2021-10-13 NOTE — TELEPHONE ENCOUNTER
He needs a note saying he can work with no restrictions  He had to renew his medical card  He renews it once a year and has to have a letter after its renewed  Call when ready

## 2021-10-15 ENCOUNTER — OFFICE VISIT (OUTPATIENT)
Dept: INTERNAL MEDICINE | Facility: CLINIC | Age: 61
End: 2021-10-15

## 2021-10-15 VITALS
DIASTOLIC BLOOD PRESSURE: 62 MMHG | SYSTOLIC BLOOD PRESSURE: 104 MMHG | BODY MASS INDEX: 19.35 KG/M2 | HEART RATE: 53 BPM | OXYGEN SATURATION: 98 % | WEIGHT: 127.7 LBS | HEIGHT: 68 IN

## 2021-10-15 DIAGNOSIS — I63.9 CEREBELLAR INFARCT (HCC): ICD-10-CM

## 2021-10-15 DIAGNOSIS — Z12.11 COLON CANCER SCREENING: ICD-10-CM

## 2021-10-15 DIAGNOSIS — R29.898 RIGHT LEG WEAKNESS: ICD-10-CM

## 2021-10-15 DIAGNOSIS — R29.898 RIGHT ARM WEAKNESS: ICD-10-CM

## 2021-10-15 DIAGNOSIS — Z72.0 TOBACCO ABUSE: Primary | ICD-10-CM

## 2021-10-15 DIAGNOSIS — I10 ESSENTIAL HYPERTENSION: ICD-10-CM

## 2021-10-15 PROBLEM — Z00.00 HEALTHCARE MAINTENANCE: Status: ACTIVE | Noted: 2021-10-15

## 2021-10-15 PROCEDURE — 99214 OFFICE O/P EST MOD 30 MIN: CPT | Performed by: FAMILY MEDICINE

## 2021-10-15 NOTE — PROGRESS NOTES
"Chief Complaint  Annual Exam and short-term disability form    Subjective          Damien Peña presents to [facility]  History of Present Illness  Patient follows up for further evaluation of right-sided weakness  Hypertension  Questing colon cancer screening  Has had cerebral infarct July of this past year with some residual effect of right-sided weakness.  Not having loss of balance thoughts are clear he is able to eat and swallow without any adverse effects  Not have any dizziness blood pressures well controlled  Objective   Vital Signs:   /62 (BP Location: Left arm, Patient Position: Sitting, Cuff Size: Adult)   Pulse 53   Ht 172 cm (67.72\")   Wt 57.9 kg (127 lb 11.2 oz)   SpO2 98%   BMI 19.58 kg/m²     Physical Exam  Vitals and nursing note reviewed.   Constitutional:       Appearance: Normal appearance.   Cardiovascular:      Rate and Rhythm: Normal rate and regular rhythm.      Pulses: Normal pulses.      Heart sounds: Normal heart sounds.   Pulmonary:      Effort: Pulmonary effort is normal.      Breath sounds: Normal breath sounds.   Musculoskeletal:         General: No tenderness.      Comments: Right upper arm 4+/5 strength  Right leg 4+/5 strength  Equal sensation bilaterally   Skin:     General: Skin is warm and dry.   Neurological:      Mental Status: He is alert.   Psychiatric:         Mood and Affect: Mood normal.         Behavior: Behavior normal.         Thought Content: Thought content normal.         Judgment: Judgment normal.        Result Review :                 Assessment and Plan    Diagnoses and all orders for this visit:    1. Tobacco abuse (Primary)    2. Right leg weakness    3. Right arm weakness    4. Essential hypertension    5. Cerebellar infarct (HCC)    6. Colon cancer screening  -     Ambulatory Referral For Screening Colonoscopy    He quit smoking 7/8/2021  Hypertension continue lisinopril  Right arm and leg weakness continue " strengthening exercises      Follow Up   No follow-ups on file.  Patient was given instructions and counseling regarding his condition or for health maintenance advice. Please see specific information pulled into the AVS if appropriate.

## 2021-10-20 ENCOUNTER — TELEPHONE (OUTPATIENT)
Dept: INTERNAL MEDICINE | Facility: CLINIC | Age: 61
End: 2021-10-20

## 2021-10-20 NOTE — TELEPHONE ENCOUNTER
Caller: Damien Peña    Relationship: Self    Best call back number: [phone]     What is the best time to reach you: ANYTIME    Who are you requesting to speak with (clinical staff, provider,  specific staff member): DR ROMAN OR MA    What was the call regarding: ESTIMATED RETURN TO WORK DATE    Do you require a callback: YES

## 2021-10-21 NOTE — TELEPHONE ENCOUNTER
His neurologist has cleared him to go back to work pending evaluation from eye doctor (ophthalmologist)

## 2021-10-28 ENCOUNTER — TELEPHONE (OUTPATIENT)
Dept: NEUROLOGY | Facility: CLINIC | Age: 61
End: 2021-10-28

## 2021-10-28 DIAGNOSIS — I63.9 CEREBELLAR INFARCT (HCC): Primary | ICD-10-CM

## 2021-10-28 NOTE — TELEPHONE ENCOUNTER
----- Message from Damien Peña sent at 10/28/2021  9:18 AM EDT -----  Regarding: re-evaluation   I would like to schedule an appointment to be re-evaluated for disability

## 2021-10-28 NOTE — TELEPHONE ENCOUNTER
"I called pt to see if new deficits were present. Pt was here 10/06/21 and you cleared pt to return from a neuro standpoint.   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 Pt is asking you to write a letter stating he is unable to work so that he can file for disability.   .Pt sts there are no new deficits but, sts he has residual right-sided weakness and his depth perception is off. I asked if he has f/u with opth and he has not and is not scheduled. Are you okay to send referral to [facility]? Pt sts you mentioned he see them.   "

## 2021-10-29 NOTE — TELEPHONE ENCOUNTER
PT IS CALLING BACK ABOUT GETTING AN APPT TO BE SEEN, AS HIS DISABILITY HAS . PT STATES THAT HE REALLY NEEDS TO GET THIS DONE AS SOON AS POSSIBLE. PLEASE ADVISE.

## 2021-12-01 ENCOUNTER — OFFICE VISIT (OUTPATIENT)
Dept: NEUROLOGY | Facility: CLINIC | Age: 61
End: 2021-12-01

## 2021-12-01 VITALS — HEART RATE: 87 BPM | BODY MASS INDEX: 20.16 KG/M2 | WEIGHT: 133 LBS | HEIGHT: 68 IN | OXYGEN SATURATION: 98 %

## 2021-12-01 DIAGNOSIS — Z91.89 AT RISK FOR OBSTRUCTIVE SLEEP APNEA: ICD-10-CM

## 2021-12-01 DIAGNOSIS — R26.81 GAIT INSTABILITY: ICD-10-CM

## 2021-12-01 DIAGNOSIS — Z87.891 SMOKING HISTORY: ICD-10-CM

## 2021-12-01 DIAGNOSIS — R29.898 RIGHT LEG WEAKNESS: ICD-10-CM

## 2021-12-01 DIAGNOSIS — I25.10 CORONARY ARTERY DISEASE INVOLVING NATIVE CORONARY ARTERY OF NATIVE HEART WITHOUT ANGINA PECTORIS: ICD-10-CM

## 2021-12-01 DIAGNOSIS — I10 ESSENTIAL HYPERTENSION: ICD-10-CM

## 2021-12-01 DIAGNOSIS — I63.9 CEREBELLAR INFARCT (HCC): Primary | ICD-10-CM

## 2021-12-01 DIAGNOSIS — I51.3 LEFT VENTRICULAR APICAL THROMBUS: ICD-10-CM

## 2021-12-01 PROCEDURE — 99214 OFFICE O/P EST MOD 30 MIN: CPT | Performed by: NURSE PRACTITIONER

## 2021-12-01 NOTE — PROGRESS NOTES
DOS: 2021  NAME: Damien Peña   : 1960  PCP: Mingo Barton MD    Chief Complaint   Patient presents with   • Stroke      Pt. is unaccompanied to today's visit.  Neurological Problem and Interval History:  61 y.o. left-handed male with a Hx of Hypertension, febrile seizures as a child, apical thrombus, CAD, tobacco abuse; quit in July; resumed pack per day, bradycardia follow-up for right cerebellar infarct, patient presented with vomiting.     Patient was last seen in follow-up by me 10/6/2021.  Patient desired to return to work due to financial need.  Patient had significant deficits lower extremity weakness; barring ophthalmology clearance by agreed patient could return to work setting of patient's PCP according to patient.  Patient since completed DOT physical and according to him due to his diagnosis he would not be able to resume the job sometime.  Patient quested repeat evaluation to further evaluate and work.  Mild right lower extremity/lightheadedness and being off balance-to report decreased stamina/fatigue which was noted at October visit.  Recent falls/illnesses and/or hospitalizations.  Patient reports that he has been operating a  job within his company [employer]: Based on date unsteadiness I do not feel that patient would be able to lift greater than 50 pounds and due to decreased stamina and fatigue post stroke which is a common finding patient will be able to maintain pace and safety at this time.  At this time, I am recommending patient to prolonged his disability further evaluate in 6 to 12 months.  I will obtain notes from DOT physical office and further evaluate next steps.       Review of Systems:        Review of Systems   Constitutional: Positive for activity change (decreased). Negative for appetite change and unexpected weight change.   HENT: Negative for facial swelling, trouble swallowing and voice change.    Eyes: Negative for photophobia, pain and visual  "disturbance.   Respiratory: Negative for chest tightness, shortness of breath and wheezing.    Cardiovascular: Negative for chest pain, palpitations and leg swelling.   Gastrointestinal: Negative for abdominal pain, nausea and vomiting.   Endocrine: Negative for polydipsia and polyphagia.   Musculoskeletal: Negative for arthralgias, back pain, gait problem, joint swelling, myalgias, neck pain and neck stiffness.   Neurological: Positive for light-headedness (in the morning). Negative for dizziness, tremors, seizures, syncope, facial asymmetry, speech difficulty, weakness, numbness and headaches.   Hematological: Does not bruise/bleed easily.   Psychiatric/Behavioral: Positive for agitation. Negative for behavioral problems, confusion, decreased concentration, dysphoric mood, hallucinations, self-injury, sleep disturbance and suicidal ideas. The patient is nervous/anxious. The patient is not hyperactive.          Current Outpatient Medications:   •  apixaban (ELIQUIS) 5 MG tablet tablet, Take 1 tablet by mouth Every 12 (Twelve) Hours for 180 days. Indications: DVT/PE (active thrombosis), Disp: 180 tablet, Rfl: 1  •  atorvastatin (LIPITOR) 40 MG tablet, Take 1 tablet by mouth Every Night for 180 days., Disp: 90 tablet, Rfl: 1  •  clopidogrel (PLAVIX) 75 MG tablet, Take 1 tablet by mouth Daily., Disp: 90 tablet, Rfl: 2  •  lisinopril (PRINIVIL,ZESTRIL) 2.5 MG tablet, Take 1 tablet by mouth Daily., Disp: 90 tablet, Rfl: 1    \"The following portions of the patient's history were reviewed and updated as appropriate: allergies, current medications, past family history, past medical history, past social history, past surgical history and problem list.\"  Review and Interpretation of Imaging:  No new imaging reviewed.     Laboratory Results:             Lab Results   Component Value Date    HGBA1C 5.20 07/10/2021         Lab Results   Component Value Date    CHOL 136 07/10/2021         Lab Results   Component Value Date    " HDL 35 (L) 07/10/2021         Lab Results   Component Value Date    LDL 86 07/10/2021         Lab Results   Component Value Date    TRIG 77 07/10/2021     No results found for: RPR  Lab Results   Component Value Date    TSH 2.390 07/10/2021     Lab Results   Component Value Date    BDNFOKGG94 339 07/10/2021     Physical Examination:   General Appearance:           Well developed, well nourished, well groomed, alert, and cooperative.  HEENT:            Normocephalic.   Neck and Spine:         Normal range of motion.  Normal alignment. No mass or tenderness. No bruits.  Cardiac:                                 Regular rate and rhythm. No murmurs.  Peripheral Vasculature:       Radial and pedal pulses are equal and symmetric.  Extremities:                           No edema or deformities. Normal joint ROM.  Skin:                                       No rashes or birth marks.     Neurological examination:  Higher Integrative  Function:         Oriented to time, place and person. Normal registration, recall, attention span and concentration. Normal language including comprehension, spontaneous speech, repetition, reading, writing, naming and vocabulary. No neglect with normal visual-spatial function and construction. Normal fund of knowledge and higher integrative function.  CN II:    Pupils are equal, round, and reactive to light. Normal visual acuity and visual fields.    CN III IV VI:      Extraocular movements are full without nystagmus.   CN V:   Normal facial sensation and strength of muscles of mastication.  CN VII:             Facial movements are symmetric. No weakness.  CN VIII:                                   Auditory acuity is normal.  CN IX & X:                              Symmetric palatal movement.  CN XI:  Sternocleidomastoid and trapezius are normal.  No weakness.  CN XII:                                    The tongue is midline.  No atrophy or fasciculations.  Motor:  Normal muscle strength, bulk  and tone in upper and lower extremities except mild for plus/weakness noted in right lower extremity.  No fasciculations, rigidity, spasticity, or abnormal movements.  Reflexes:         Plantar responses are flexor.  Sensation:       Normal to light touch in arms and legs.   Station and Gait:        Abnormal gait and station; gait steadiness appears to due to mild right lower extremity weakness.   Coordination:                        Finger to nose test shows no dysmetria.         Diagnoses:    1.  Right cerebellar stroke (July 2021)  2.  History of apical thrombus; not on anticoagulation due to medication noncompliance just prior to the event-now compliant with medication  3.  Hypertension: On low-dose lisinopril  4.  CAD  5.  Tobacco abuse  6.  At risk for obstructive sleep apnea    Plan:   Eliquis 5 mg twice daily   Atorvastatin 40 mg daily   Plavix 75 mg daily   Consider outpatient PRINCESS evaluation   Keep planned follow-up with neuro-ophthalmology   Blood pressure control to <130/80   Goal LDL <70-recommend high dose statins-    Serum glucose < 140   Call 911 for stroke any stroke symptoms   Follow-up 6 months  Diagnoses and all orders for this visit:    1. Cerebellar infarct (HCC) (Primary)    2. At risk for obstructive sleep apnea    3. Coronary artery disease involving native coronary artery of native heart without angina pectoris    4. Left ventricular apical thrombus    5. Smoking history    6. Essential hypertension    7. Right leg weakness    8. Gait instability

## 2021-12-06 ENCOUNTER — TELEPHONE (OUTPATIENT)
Dept: NEUROLOGY | Facility: CLINIC | Age: 61
End: 2021-12-06

## 2021-12-06 NOTE — TELEPHONE ENCOUNTER
Caller: PAT     Relationship: PT    Best call back number: [phone]    What form or medical record are you requesting: FORM FOR DISABILITY     Who is requesting this form or medical record from you: DISABILITY     How would you like to receive the form or medical records (pick-up, mail, fax):    JUST LET PT KNOW WHEN READY FOR      Timeframe paperwork needed: ASAP.     Additional notes: PT WAS JUST SEEN 12/1/21

## 2021-12-07 NOTE — TELEPHONE ENCOUNTER
Provider:  ADALGISA SANTOYO   Caller:  PAT   Relationship to Patient:  PT     Phone Number: [phone]  Reason for Call:  PT CALLING IN IS VERY CONCERNED ABOUT GETTING THIS DISABILITY PAPERWORK COMPLETED IS WANTING TO KNOW IF IT WOULD BE EASIER FOR HIM TO HAVE [facility]ION FAX OVER FORMS AND THEN GIVE RETURN FAX NUMBER FOR IT BE SENT BACK TO SPEED UP PROCESS  PLEASE ADVISE   When was the patient last seen: 12/01/2021  PLEASE CONTACT PT IF ALREADY HAVE FORMS SO HE KNOWS  STATUS ..

## 2021-12-10 ENCOUNTER — TELEPHONE (OUTPATIENT)
Dept: NEUROLOGY | Facility: OTHER | Age: 61
End: 2021-12-10

## 2021-12-10 NOTE — TELEPHONE ENCOUNTER
Patient called to ask about disability paperwork and if it had been finished. I was able to warm transfer call to daniel at the office neuro

## 2021-12-10 NOTE — TELEPHONE ENCOUNTER
Provider: YARELIS  Caller: PT  Relationship to Patient: SELF  Pharmacy: N/A  Phone Number: [phone]  Reason for Call: PT IS FOLLOWING UP ON THE STATUS OF HIS DISABILITY PPW; HAS THIS BEEN COMPLETED?     PLEASE CALL & ADVISE.    THANK YOU.

## 2021-12-15 NOTE — TELEPHONE ENCOUNTER
Pt notified Cathi spoke with employee health APRN and was informed it was company policy to not allow employees to drive for 1 year post stroke/TIA. Explained that Cathi had already cleared him to work and with no new/worsening s/s of stroke or TIA, she was unable to complete disability paperwork. Pt verbalized understanding.

## 2021-12-28 ENCOUNTER — DOCUMENTATION (OUTPATIENT)
Dept: NEUROLOGY | Facility: CLINIC | Age: 61
End: 2021-12-28

## 2021-12-28 ENCOUNTER — TELEPHONE (OUTPATIENT)
Dept: NEUROLOGY | Facility: CLINIC | Age: 61
End: 2021-12-28

## 2021-12-28 DIAGNOSIS — I63.9 CEREBELLAR INFARCT (HCC): Primary | ICD-10-CM

## 2021-12-28 NOTE — TELEPHONE ENCOUNTER
Pt requested Cathi complete forms indicating that he cannot lift >50 lbs or return to work until July, 2022. I informed pt Cathi ordered PT to check for weight restrictions needed to return to work. Pt sts he has no insurance at the time. I explained to pt that Cathi would not be able to determine a date that he would be able to life >50 lbs; pt would need to have this assessed through PT and that she would not be able to complete the forms stating he could not return until July, 2022.  Pt verbalized understanding. Pt informed that PT order would remain active should he decide to go this route.

## 2021-12-28 NOTE — PROGRESS NOTES
"Patient calling nearly every day requesting very specific recommendations for his disability paperwork; also asking me to add verbiage to previously completed paperwork.  Based on conversation with patient he has been instructed that he cannot drive at his current job although he possesses a 's license and has no specific driving restrictions according to DOT examiner there is a 1 year post stroke exemption from driving company vehicle.      Patient's exam is nonfocal; he has been cleared by ophthalmology and has no visible deficits on my previous exams minus decreased stamina/fatigue/intermittent unsteadiness.  I obtained occupational medicine notes from 10/13/2021 visit and spoke with provider via telephone who notes that standard requires 1 year waiting period  for driving and noted no other physical limitations therefore releasing patient to obtain another position within the company.  Patient has expressed to me that he has no desire to be a \"\" and would like for me to extend his disability through July 2022 (date he can return to driving).  I previously noted in my last note that patient would have approximately 50 pound weight restriction although I am unsure if this is accurate vs. typo therefore I plan to refer him to physical therapy to further assist with any weight restriction suggestions and recommendations as patient states he is unable to obtain a  job within the company without ability to lift 70 pounds.   "

## 2022-01-05 ENCOUNTER — TREATMENT (OUTPATIENT)
Dept: PHYSICAL THERAPY | Facility: CLINIC | Age: 62
End: 2022-01-05

## 2022-01-05 DIAGNOSIS — I63.9 CEREBELLAR INFARCT: Primary | ICD-10-CM

## 2022-01-05 PROCEDURE — 97161 PT EVAL LOW COMPLEX 20 MIN: CPT | Performed by: PHYSICAL THERAPIST

## 2022-01-05 PROCEDURE — 97530 THERAPEUTIC ACTIVITIES: CPT | Performed by: PHYSICAL THERAPIST

## 2022-01-05 NOTE — PATIENT INSTRUCTIONS
Patient was instructed to reach out to his MD as to determine what step to take next.  He was unable to lift 70 lbs secondary to pain in his lumbar spine region (which he has been dealing with for a couple of months).

## 2022-01-05 NOTE — PROGRESS NOTES
Physical Therapy Initial Evaluation and Plan of Care    Patient: Damien Peña   : 1960  Diagnosis/ICD-10 Code:  Cerebellar infarct (HCC) [I63.9]  Referring practitioner: TRUPTI White  Date of Initial Visit: 2022  Today's Date: 2022  Patient seen for 1 session         Visit Diagnoses:    ICD-10-CM ICD-9-CM   1. Cerebellar infarct (HCC)  I63.9 434.91               Subjective Evaluation    History of Present Illness  Mechanism of injury: Patient had a stroke in July.  Patient had in home PT for about 2 months.  Patient reports that all of the therapy went well.  Patient reports that the stroke mainly affected his right side.  Patient recently saw his PCP who referred him to PT to assess his lifting ability.    Patient states that he injured his back about 2 months ago and has been seeing a chiropractor for it.  Patient works at [employer] as a .  He states that he lifts, pushes and pulls.  Patient states that he will have to lift up to 70 lbs from waist to waist.      Patient Occupation: [employer]  Current pain ratin  At worst pain ratin  Location: low back  Quality: stabbing at times.             Objective          Active Range of Motion     Lumbar   Flexion: WFL  Extension: Active lumbar extension: about 15.     Additional Active Range of Motion Details  Shoulder AROM: Flexion, Abduction, ER WNL bilaterally    Strength/Myotome Testing     Left Shoulder     Planes of Motion   Flexion: 4+   Abduction: 4+   External rotation at 0°: 4     Isolated Muscles   Biceps: 4+   Triceps: 4     Right Shoulder     Planes of Motion   Flexion: 4   Abduction: 4   External rotation at 0°: 4     Isolated Muscles   Biceps: 4+   Triceps: 4           Assessment & Plan     Assessment    Assessment details: Patient was only referred to assess his ability to lift up to 70 lbs in order to return to work.  Prognosis details: STG's to be met by 2 weeks  1)  Assess lifting floor to waist up to 70 lbs            LTG's  1)  F/U with MD      Plan  Duration in visits: 1            Timed:         Manual Therapy:    0     mins  77076;     Therapeutic Exercise:    0     mins  75302;     Neuromuscular Hermilo:    0    mins  71922;    Therapeutic Activity:     10     mins  94112;     Gait Trainin     mins  32854;     Ultrasound:     0     mins  62468;          Un-Timed:  Electrical Stimulation:    0     mins  54652 ( );    Low Eval     10     Mins  47800  Mod Eval     0     Mins  87531  High Eval                       0     Mins  52292        Timed Treatment:   10   mins   Total Treatment:     30   mins          PT: Jmimy Doss, PT     [place] License [id number]  Electronically signed by Jimmy Doss PT, 22, 9:46 AM EST    Certification Period: 2022 thru 2022  I certify that the therapy services are furnished while this patient is under my care.  The services outlined above are required by this patient, and will be reviewed every 90 days.         Physician Signature:__________________________________________________    PHYSICIAN: Cathi Zapien APRN      DATE:     Please sign and return via fax to [email or web address] . Thank you, [facility].

## 2022-01-11 DIAGNOSIS — I42.8 NON-ISCHEMIC CARDIOMYOPATHY: ICD-10-CM

## 2022-01-11 DIAGNOSIS — R00.1 BRADYCARDIA, SINUS: ICD-10-CM

## 2022-01-11 DIAGNOSIS — I25.118 CORONARY ARTERY DISEASE OF NATIVE ARTERY OF NATIVE HEART WITH STABLE ANGINA PECTORIS: ICD-10-CM

## 2022-01-11 DIAGNOSIS — I10 ESSENTIAL HYPERTENSION: ICD-10-CM

## 2022-01-11 DIAGNOSIS — N28.0 RENAL ARTERY THROMBOSIS: ICD-10-CM

## 2022-01-11 DIAGNOSIS — I25.10 CORONARY ARTERY DISEASE INVOLVING NATIVE CORONARY ARTERY OF NATIVE HEART WITHOUT ANGINA PECTORIS: ICD-10-CM

## 2022-01-11 RX ORDER — ATORVASTATIN CALCIUM 40 MG/1
40 TABLET, FILM COATED ORAL NIGHTLY
Qty: 90 TABLET | Refills: 1 | Status: SHIPPED | OUTPATIENT
Start: 2022-01-11 | End: 2022-02-14

## 2022-01-14 ENCOUNTER — TELEPHONE (OUTPATIENT)
Dept: INTERNAL MEDICINE | Facility: CLINIC | Age: 62
End: 2022-01-14

## 2022-01-14 NOTE — TELEPHONE ENCOUNTER
Caller: Damien Peña    Relationship to patient: Self    Best call back number: [phone] (H)    Patient is needing: PATIENT STATES DROPPED OFF PAPERWORK FOR DISABILITY BUT FORGOT TO PUT REASON FOR THE  PAPERWORK WHICH WAS PATIENT IS NOT  ABLE TO WORK AT THIS TIME

## 2022-01-19 NOTE — TELEPHONE ENCOUNTER
Caller: Damien Peña    Relationship: Self    Best call back number: [phone] (H)    What is the best time to reach you: ANY TIME     Who are you requesting to speak with (clinical staff, provider,  specific staff member): CLINICAL STAFF     What was the call regarding: PATIENT IS CURIOUS IF WE RECEIVED HIS DISABILITY PAPERWORK-   HE DROPPED THIS OFF IN PERSON RECENTLY.  SAYS HE CALLED AND LEFT A VOICEMAIL YESTERDAY ABOUT IT TOO CURIOUS IF THE PAPERS GOT TO THE RIGHT PERSON AND HAVE THEY BEEN STARTED OR COMPLETED YET- HE HAS YET TO HEAR BACK FROM US.    PLEASE FOLLOW UP WITH PATIENT.           Do you require a callback: YES

## 2022-01-20 NOTE — TELEPHONE ENCOUNTER
Hub staff attempted to follow warm transfer process and was unsuccessful     Caller: Damien Peña    Relationship to patient: Self    Best call back number: [phone]      Patient is needing: PATIENT NEEDS TO KNOW IF IS DISABILITY PAPERWORK HAS BEEN FILLED OUT . PLEASE CALL PATIENT AND ADVISE.

## 2022-01-24 ENCOUNTER — DOCUMENTATION (OUTPATIENT)
Dept: PHYSICAL THERAPY | Facility: CLINIC | Age: 62
End: 2022-01-24

## 2022-02-14 DIAGNOSIS — I25.118 CORONARY ARTERY DISEASE OF NATIVE ARTERY OF NATIVE HEART WITH STABLE ANGINA PECTORIS: ICD-10-CM

## 2022-02-14 DIAGNOSIS — R00.1 BRADYCARDIA, SINUS: ICD-10-CM

## 2022-02-14 DIAGNOSIS — N28.0 RENAL ARTERY THROMBOSIS: ICD-10-CM

## 2022-02-14 DIAGNOSIS — I42.8 NON-ISCHEMIC CARDIOMYOPATHY: ICD-10-CM

## 2022-02-14 DIAGNOSIS — I10 ESSENTIAL HYPERTENSION: ICD-10-CM

## 2022-02-14 DIAGNOSIS — I25.10 CORONARY ARTERY DISEASE INVOLVING NATIVE CORONARY ARTERY OF NATIVE HEART WITHOUT ANGINA PECTORIS: ICD-10-CM

## 2022-02-14 RX ORDER — ATORVASTATIN CALCIUM 40 MG/1
40 TABLET, FILM COATED ORAL NIGHTLY
Qty: 90 TABLET | Refills: 0 | Status: SHIPPED | OUTPATIENT
Start: 2022-02-14 | End: 2022-04-05

## 2022-02-17 DIAGNOSIS — I10 ESSENTIAL HYPERTENSION: ICD-10-CM

## 2022-02-17 DIAGNOSIS — I42.8 NON-ISCHEMIC CARDIOMYOPATHY: ICD-10-CM

## 2022-02-17 DIAGNOSIS — N28.0 RENAL ARTERY THROMBOSIS: ICD-10-CM

## 2022-02-17 DIAGNOSIS — R00.1 BRADYCARDIA, SINUS: ICD-10-CM

## 2022-02-17 DIAGNOSIS — I25.118 CORONARY ARTERY DISEASE OF NATIVE ARTERY OF NATIVE HEART WITH STABLE ANGINA PECTORIS: ICD-10-CM

## 2022-02-17 DIAGNOSIS — I25.10 CORONARY ARTERY DISEASE INVOLVING NATIVE CORONARY ARTERY OF NATIVE HEART WITHOUT ANGINA PECTORIS: ICD-10-CM

## 2022-02-17 RX ORDER — LISINOPRIL 2.5 MG/1
TABLET ORAL
Qty: 90 TABLET | Refills: 1 | Status: SHIPPED | OUTPATIENT
Start: 2022-02-17

## 2022-02-23 DIAGNOSIS — I10 ESSENTIAL HYPERTENSION: ICD-10-CM

## 2022-02-23 DIAGNOSIS — R00.1 BRADYCARDIA, SINUS: ICD-10-CM

## 2022-02-23 DIAGNOSIS — I42.8 NON-ISCHEMIC CARDIOMYOPATHY: ICD-10-CM

## 2022-02-23 DIAGNOSIS — I25.10 CORONARY ARTERY DISEASE INVOLVING NATIVE CORONARY ARTERY OF NATIVE HEART WITHOUT ANGINA PECTORIS: ICD-10-CM

## 2022-02-23 DIAGNOSIS — I25.118 CORONARY ARTERY DISEASE OF NATIVE ARTERY OF NATIVE HEART WITH STABLE ANGINA PECTORIS: ICD-10-CM

## 2022-02-23 DIAGNOSIS — N28.0 RENAL ARTERY THROMBOSIS: ICD-10-CM

## 2022-02-23 RX ORDER — APIXABAN 5 MG/1
TABLET, FILM COATED ORAL
Qty: 180 TABLET | Refills: 0 | Status: SHIPPED | OUTPATIENT
Start: 2022-02-23 | End: 2022-04-08 | Stop reason: SDUPTHER

## 2022-03-11 ENCOUNTER — TELEPHONE (OUTPATIENT)
Dept: INTERNAL MEDICINE | Facility: CLINIC | Age: 62
End: 2022-03-11

## 2022-03-11 DIAGNOSIS — R93.89 ABNORMAL CT OF THE CHEST: Primary | ICD-10-CM

## 2022-03-11 NOTE — TELEPHONE ENCOUNTER
Can you order a chest CT w/o contrast? Damien had one done on 7/30/2021 and it was recommended he have a follow up due to abnormalities. I can see if I can get him in ASAP. Thank you.

## 2022-03-28 ENCOUNTER — TELEPHONE (OUTPATIENT)
Dept: NEUROLOGY | Facility: CLINIC | Age: 62
End: 2022-03-28

## 2022-04-05 DIAGNOSIS — I25.118 CORONARY ARTERY DISEASE OF NATIVE ARTERY OF NATIVE HEART WITH STABLE ANGINA PECTORIS: ICD-10-CM

## 2022-04-05 DIAGNOSIS — N28.0 RENAL ARTERY THROMBOSIS: ICD-10-CM

## 2022-04-05 DIAGNOSIS — R00.1 BRADYCARDIA, SINUS: ICD-10-CM

## 2022-04-05 DIAGNOSIS — I42.8 NON-ISCHEMIC CARDIOMYOPATHY: ICD-10-CM

## 2022-04-05 DIAGNOSIS — I25.10 CORONARY ARTERY DISEASE INVOLVING NATIVE CORONARY ARTERY OF NATIVE HEART WITHOUT ANGINA PECTORIS: ICD-10-CM

## 2022-04-05 DIAGNOSIS — I10 ESSENTIAL HYPERTENSION: ICD-10-CM

## 2022-04-05 RX ORDER — ATORVASTATIN CALCIUM 40 MG/1
40 TABLET, FILM COATED ORAL NIGHTLY
Qty: 90 TABLET | Refills: 0 | Status: SHIPPED | OUTPATIENT
Start: 2022-04-05 | End: 2022-10-02

## 2022-04-08 ENCOUNTER — TELEPHONE (OUTPATIENT)
Dept: CARDIOLOGY | Facility: CLINIC | Age: 62
End: 2022-04-08

## 2022-04-08 ENCOUNTER — TELEPHONE (OUTPATIENT)
Dept: INTERNAL MEDICINE | Facility: CLINIC | Age: 62
End: 2022-04-08

## 2022-04-08 DIAGNOSIS — N28.0 RENAL ARTERY THROMBOSIS: ICD-10-CM

## 2022-04-08 DIAGNOSIS — I10 ESSENTIAL HYPERTENSION: ICD-10-CM

## 2022-04-08 DIAGNOSIS — I42.8 NON-ISCHEMIC CARDIOMYOPATHY: ICD-10-CM

## 2022-04-08 DIAGNOSIS — I25.118 CORONARY ARTERY DISEASE OF NATIVE ARTERY OF NATIVE HEART WITH STABLE ANGINA PECTORIS: ICD-10-CM

## 2022-04-08 DIAGNOSIS — R00.1 BRADYCARDIA, SINUS: ICD-10-CM

## 2022-04-08 DIAGNOSIS — I25.10 CORONARY ARTERY DISEASE INVOLVING NATIVE CORONARY ARTERY OF NATIVE HEART WITHOUT ANGINA PECTORIS: ICD-10-CM

## 2022-04-08 NOTE — TELEPHONE ENCOUNTER
Patient called stating that he has been feeling dizzy for about a week.  He said that he is not sure if it is his ears.  He does not want to go to Urgent Care and would like to wait for Dr. Barton to return to the office.  He has an appointment to be seen on 4/15/22 but will call if needs an earlier appointment or will go to Urgent care.

## 2022-04-08 NOTE — TELEPHONE ENCOUNTER
Patient called and stated that he needs a refill on his eliquis 5 mg    PRINCIPAL DISCHARGE DIAGNOSIS  Diagnosis: Fracture of femoral neck, left  Assessment and Plan of Treatment:        PRINCIPAL DISCHARGE DIAGNOSIS  Diagnosis: Fracture of femoral neck, left  Assessment and Plan of Treatment: s/p Left Hip Camacho posterior approach

## 2022-04-15 ENCOUNTER — OFFICE VISIT (OUTPATIENT)
Dept: INTERNAL MEDICINE | Facility: CLINIC | Age: 62
End: 2022-04-15

## 2022-04-15 VITALS
SYSTOLIC BLOOD PRESSURE: 106 MMHG | WEIGHT: 128.2 LBS | HEIGHT: 68 IN | BODY MASS INDEX: 19.43 KG/M2 | HEART RATE: 88 BPM | DIASTOLIC BLOOD PRESSURE: 74 MMHG | OXYGEN SATURATION: 97 %

## 2022-04-15 DIAGNOSIS — R42 VERTIGO: Primary | ICD-10-CM

## 2022-04-15 DIAGNOSIS — Z72.0 TOBACCO ABUSE: ICD-10-CM

## 2022-04-15 DIAGNOSIS — I25.10 CORONARY ARTERY DISEASE INVOLVING NATIVE CORONARY ARTERY OF NATIVE HEART WITHOUT ANGINA PECTORIS: ICD-10-CM

## 2022-04-15 DIAGNOSIS — I63.9 CEREBELLAR INFARCT: ICD-10-CM

## 2022-04-15 DIAGNOSIS — I10 ESSENTIAL HYPERTENSION: ICD-10-CM

## 2022-04-15 PROCEDURE — 99214 OFFICE O/P EST MOD 30 MIN: CPT | Performed by: FAMILY MEDICINE

## 2022-04-15 NOTE — PROGRESS NOTES
"Chief Complaint  FMLA paperwork and Dizziness    Subjective          Damien Peña presents to [facility]  History of Present Illness  Patient follows up for ongoing management of chronic problems of hypertension history of cerebellar stroke hyperlipidemia need for anticoagulation therapy.  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  History of coronary disease with stent placement years ago  No chest pain or coronary symptoms  Objective   Vital Signs:   /74 (BP Location: Left arm, Patient Position: Sitting, Cuff Size: Adult)   Pulse 88   Ht 172 cm (67.72\")   Wt 58.2 kg (128 lb 3.2 oz)   SpO2 97%   BMI 19.65 kg/m²     BMI is within normal parameters. No follow-up required.      Physical Exam  Vitals and nursing note reviewed.   Constitutional:       Appearance: Normal appearance.   HENT:      Head: Normocephalic and atraumatic.      Right Ear: There is impacted cerumen.      Left Ear: There is impacted cerumen.   Cardiovascular:      Rate and Rhythm: Normal rate and regular rhythm.      Pulses: Normal pulses.      Heart sounds: Normal heart sounds.   Pulmonary:      Effort: Pulmonary effort is normal.      Breath sounds: Normal breath sounds.   Abdominal:      Tenderness: There is no right CVA tenderness or left CVA tenderness.   Musculoskeletal:         General: No swelling or tenderness.      Right lower leg: No edema.      Left lower leg: No edema.   Skin:     General: Skin is warm and dry.   Neurological:      Mental Status: He is alert. Mental status is at baseline.      Sensory: Sensory deficit present.      Motor: Weakness present.      Coordination: Coordination abnormal.      Gait: Gait " normal.      Deep Tendon Reflexes: Reflexes normal.   Psychiatric:         Mood and Affect: Mood normal.         Behavior: Behavior normal.         Thought Content: Thought content normal.         Judgment: Judgment normal.        Result Review :     Common labs    Common Labsle 7/8/21 7/8/21 7/10/21 7/10/21 7/10/21 7/10/21    1710 1710 0616 0616 0616 0616   Glucose  148 (A)    90   BUN  11    9   Creatinine  1.06    0.99   eGFR Non African Am  72    77   Sodium  138    140   Potassium  3.4 (A)    4.0   Chloride  106    111 (A)   Calcium  8.4 (A)    8.0 (A)   Albumin  4.00       Total Bilirubin  0.5       Alkaline Phosphatase  53       AST (SGOT)  13       ALT (SGPT)  12       WBC 8.38  8.69      Hemoglobin 16.3  14.8      Hematocrit 46.7  42.1      Platelets 189  169      Total Cholesterol     136    Triglycerides     77    HDL Cholesterol     35 (A)    LDL Cholesterol      86    Hemoglobin A1C    5.20     (A) Abnormal value                      Assessment and Plan    Diagnoses and all orders for this visit:    1. Vertigo (Primary)    2. Essential hypertension    3. Coronary artery disease involving native coronary artery of native heart without angina pectoris    4. Cerebellar infarct (HCC)    5. Tobacco abuse    Suspect orthostasis recommend increase water equal to amount of coffee consumed  Monitor blood pressure goals less than 140/90 recommend testing lying and then standing  Rubina signs and symptoms of coronary artery compromise including shortness of breath chest pressure  Recommend smoking cessation  I spent 30 minutes caring for Damien on this date of service. This time includes time spent by me in the following activities:reviewing tests, performing a medically appropriate examination and/or evaluation , counseling and educating the patient/family/caregiver, documenting information in the medical record and independently interpreting results and communicating that information with the  patient/family/caregiver  Follow Up   Return in about 6 months (around 10/15/2022), or if symptoms worsen or fail to improve, for Recheck.  Patient was given instructions and counseling regarding his condition or for health maintenance advice. Please see specific information pulled into the AVS if appropriate.

## 2022-06-01 ENCOUNTER — TELEPHONE (OUTPATIENT)
Dept: CARDIOLOGY | Facility: CLINIC | Age: 62
End: 2022-06-01

## 2022-06-01 NOTE — TELEPHONE ENCOUNTER
Pt called to inquire about the prices of Atorvastatin and Eliquis without insurance. He also wanted to know if any medications are blood thinners.

## 2022-06-01 NOTE — TELEPHONE ENCOUNTER
Eliquis is a blood thinner    patient would have to call the pharmacy and get the price of the medication     patient notified

## 2022-07-29 ENCOUNTER — APPOINTMENT (OUTPATIENT)
Dept: CT IMAGING | Facility: HOSPITAL | Age: 62
End: 2022-07-29

## 2022-09-13 ENCOUNTER — TELEPHONE (OUTPATIENT)
Dept: NEUROLOGY | Facility: CLINIC | Age: 62
End: 2022-09-13

## 2022-09-13 NOTE — TELEPHONE ENCOUNTER
Provider:TRUPTI SMALLS  Caller: PAT BOSS  Relationship to Patient: SELF  Phone Number: [phone]    Reason for Call:   PT IS CALLING NEEDING A LETTER STATING WHAT HIS DIAGNOSIS IS AND WHAT THE TREATMENT WAS AND  ALSO THAT HE IS CLEAR TO WORK. PT CAN EITHER PICK IT UP OR IT CAN BE MAILED.

## 2022-09-15 ENCOUNTER — OFFICE VISIT (OUTPATIENT)
Dept: NEUROLOGY | Facility: CLINIC | Age: 62
End: 2022-09-15

## 2022-09-15 VITALS
BODY MASS INDEX: 18.37 KG/M2 | SYSTOLIC BLOOD PRESSURE: 100 MMHG | WEIGHT: 121.2 LBS | OXYGEN SATURATION: 97 % | DIASTOLIC BLOOD PRESSURE: 60 MMHG | HEIGHT: 68 IN | HEART RATE: 98 BPM

## 2022-09-15 DIAGNOSIS — I10 ESSENTIAL HYPERTENSION: ICD-10-CM

## 2022-09-15 DIAGNOSIS — I51.3 LEFT VENTRICULAR APICAL THROMBUS: ICD-10-CM

## 2022-09-15 DIAGNOSIS — Z86.73 HISTORY OF STROKE: Primary | ICD-10-CM

## 2022-09-15 DIAGNOSIS — Z72.0 TOBACCO ABUSE: ICD-10-CM

## 2022-09-15 PROCEDURE — 99213 OFFICE O/P EST LOW 20 MIN: CPT | Performed by: NURSE PRACTITIONER

## 2022-09-15 NOTE — PROGRESS NOTES
DOS: 9/15/2022  NAME: Damien Peña   : 1960  PCP: Mingo Barton MD    Chief Complaint   Patient presents with   • Stroke      Patient is unaccompanied to today's visit.         Neurological Problem and Interval History:  62 y.o. left-handed male with known history of hypertension, febrile seizures as a child, apical thrombus, CKD, CAD, tobacco abuse, bradycardia who I am seeing today in follow-up for right cerebellar infarct from 2021 where patient presented with acute onset of dizziness with associated nausea and vomiting and sensation of being off balance.  MRI of the brain later confirmed acute right cerebellar infarct.  Patient was discharged home on full dose Eliquis 5 mg twice daily, Plavix 75 mg daily and atorvastatin 40 mg daily for secondary stroke prevention.  Patient has remained compliant with these medications since discharge.  He also reports that he has decreased his tobacco intake has decreased from 1-1/2 packs of cigarettes per day to less than a pack per day continues to attempt to discontinue smoking.    Today, he denies any new signs and/or symptoms of stroke.  He is on low-dose lisinopril; blood pressure today low at 100/60 recommend that patient continue to monitor blood pressure at home if blood pressure less than 120 should discontinue lisinopril.  He denies any issues with mood specifically no concern for depression and/or PBA.  He has no issues with sleep; wakes up feeling rested previously worked night shift and tolerated without difficulty.  Denies any recent illnesses/falls and/or hospitalizations.  He is at his neurologic baseline and has no residual deficits.  He completes all ADLs without assistance.  He denies any other complaints and or concerns.  Previously received visual clearance from neuro ophthalmology since stroke.  Based on his exam findings today I see no reason that patient cannot return to full-time employment.      Review of Systems:        Review of  "Systems   Constitutional: Negative for fatigue and unexpected weight change.   HENT: Negative for ear pain, nosebleeds and trouble swallowing.    Eyes: Negative for photophobia, pain and visual disturbance.   Respiratory: Negative for choking, chest tightness, shortness of breath, wheezing and stridor.    Cardiovascular: Negative for chest pain, palpitations and leg swelling.   Musculoskeletal: Negative for gait problem, joint swelling, neck pain and neck stiffness.   Neurological: Negative for dizziness, tremors, seizures, speech difficulty, weakness, light-headedness, numbness and headaches.   Psychiatric/Behavioral: Negative for agitation, confusion, decreased concentration and sleep disturbance. The patient is not nervous/anxious.          Current Outpatient Medications:   •  apixaban (Eliquis) 5 MG tablet tablet, Take 1 tablet by mouth twice daily, Disp: 180 tablet, Rfl: 0  •  atorvastatin (LIPITOR) 40 MG tablet, TAKE 1 TABLET BY MOUTH EVERY NIGHT  DAYS. (Patient taking differently: Take 80 mg by mouth Every Night.), Disp: 90 tablet, Rfl: 0  •  clopidogrel (PLAVIX) 75 MG tablet, Take 1 tablet by mouth Daily., Disp: 90 tablet, Rfl: 2  •  lisinopril (PRINIVIL,ZESTRIL) 2.5 MG tablet, TAKE 1 TABLET BY MOUTH EVERY DAY, Disp: 90 tablet, Rfl: 1    \"The following portions of the patient's history were reviewed and updated as appropriate: allergies, current medications, past family history, past medical history, past social history, past surgical history and problem list.\"  Review and Interpretation of Imaging:  Imaging reviewed  Laboratory Results:             Lab Results   Component Value Date    HGBA1C 5.20 07/10/2021         Lab Results   Component Value Date    CHOL 136 07/10/2021         Lab Results   Component Value Date    HDL 35 (L) 07/10/2021         Lab Results   Component Value Date    LDL 86 07/10/2021         Lab Results   Component Value Date    TRIG 77 07/10/2021     No results found for: RPR  Lab " Results   Component Value Date    TSH 2.390 07/10/2021     Lab Results   Component Value Date    HRDHGKKF65 339 07/10/2021       Physical Examination: NIHSS: 0 mRS: 0  General Appearance:   Well developed, well nourished, well groomed, alert, and cooperative.  HEENT: Normocephalic.   Neck and Spine: Normal range of motion.  Normal alignment. No mass or tenderness. No bruits.  Cardiac: Regular rate and rhythm. No murmurs.  Peripheral Vasculature: Radial and pedal pulses are equal and symmetric.  Extremities:    No edema or deformities. Normal joint ROM.  Skin:    No rashes or birth marks.    Neurological examination:  Higher Integrative  Function: Oriented to time, place and person. Normal registration, recall, attention span and concentration. Normal language including comprehension, spontaneous speech, repetition, reading, writing, naming and vocabulary. No neglect with normal visual-spatial function and construction. Normal fund of knowledge and higher integrative function.  CN II: Pupils are equal, round, and reactive to light. Normal visual acuity and visual fields.    CN III IV VI: Extraocular movements are full without nystagmus.   CN V: Normal facial sensation and strength of muscles of mastication.  CN VII: Facial movements are symmetric. No weakness.  CN VIII:   Auditory acuity is normal.  CN IX & X:   Symmetric palatal movement.  CN XI: Sternocleidomastoid and trapezius are normal.  No weakness.  CN XII:   The tongue is midline.  No atrophy or fasciculations.  Motor: Normal muscle strength, bulk and tone in upper and lower extremities.  No fasciculations, rigidity, spasticity, or abnormal movements.  Reflexes: Plantar responses are flexor.  Sensation: Normal to light touch in arms and legs.   Station and Gait: Normal gait and station.    Coordination:  Finger to nose test shows no dysmetria.        Diagnoses:    1.  History of stroke; right cerebellar (July 2021) patient without residual deficits from  stroke  2.  History of apical thrombus; fully anticoagulated with Eliquis and Plavix  3.  History of hypertension; on low-dose lisinopril-monitor for hypotension if systolic blood pressure remains less than 120 should discontinue  4.  Coronary artery disease; on antiplatelet and high-dose statin  5.  Tobacco abuse; discussed smoking cessation patient attempting to quit      Plan:   Patient with no residual deficits secondary to stroke therefore he can return to full-time employement  as a  from a neurology perspective   Continue Eliquis 5 mg twice daily, atorvastatin 80 mg daily and Plavix 75 mg daily   Blood pressure control to <130/80   Goal LDL <70-recommend high dose statins-    Serum glucose < 140   Call 911 for stroke any stroke symptoms   Follow-up as needed moving forward      Diagnoses and all orders for this visit:    1. History of stroke (Primary)    2. Tobacco abuse    3. Essential hypertension    4. Left ventricular apical thrombus

## 2022-09-23 NOTE — TELEPHONE ENCOUNTER
Left detailed message see prior message   UNABLE TO REACH PT, LM THAT PROVIDER IS OUT OF OFFICE UNTIL 9/26/22.  MESSAGE SENT TO PROVIDER.  PLEASE REVIEW  THANK YOU

## 2022-09-23 NOTE — TELEPHONE ENCOUNTER
PT STATES YOU LEFT OUT IMPORTANT DOCUMENTATION IN LETTER.    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     FAX TO # [phone]

## 2022-09-26 ENCOUNTER — DOCUMENTATION (OUTPATIENT)
Dept: NEUROLOGY | Facility: CLINIC | Age: 62
End: 2022-09-26

## 2022-09-26 NOTE — TELEPHONE ENCOUNTER
Called to speak with patient; asked for contact information for DOT provider he provided me with Dr. Davidson's number [phone].  Left message for Dr. Davidson to call me back to further discuss verbiage needed for clearance for patient to return to work.  I will await callback.

## 2022-09-26 NOTE — TELEPHONE ENCOUNTER
Caller: PAT  Relationship to Patient: SELF  Phone Number: [phone]    Reason for Call: PT CALLED TO CHECK ON STATUS

## 2022-09-26 NOTE — TELEPHONE ENCOUNTER
I spoke with pt and per Cathi Zapien, pt instructed to check with the DOT for further evaluation or his primary care.  Pt voices understanding and says he will have the DOT call her.

## 2022-09-26 NOTE — PROGRESS NOTES
"Received a message from Dr. Joes Davidson from [facility] occupational medicine office who reports \"soft clearance\" required from neurology service stating cleared to drive commercial vehicle-verbiage is indicated to ensure that patient has been transparent regarding occupation and accurate request for clearance to return to work.  Adjustments made to most recent office note-will fax to [phone]  "

## 2022-09-26 NOTE — TELEPHONE ENCOUNTER
Caller: PATIENT   Relationship to Patient: SELF  Phone Number: [phone]  Reason for Call: PATIENT CALLED BACK TO SPEAK WITH CIRILO REGARDING DOT LETTER.  PATIENT WOULD LIKE A CALL BACK PLEASE.     THANK YOU

## 2023-04-18 ENCOUNTER — OFFICE VISIT (OUTPATIENT)
Dept: CARDIOLOGY | Facility: CLINIC | Age: 63
End: 2023-04-18
Payer: OTHER GOVERNMENT

## 2023-04-18 VITALS
OXYGEN SATURATION: 95 % | BODY MASS INDEX: 19.25 KG/M2 | HEIGHT: 68 IN | HEART RATE: 71 BPM | WEIGHT: 127 LBS | DIASTOLIC BLOOD PRESSURE: 79 MMHG | SYSTOLIC BLOOD PRESSURE: 113 MMHG

## 2023-04-18 DIAGNOSIS — I51.3 LEFT VENTRICULAR APICAL THROMBUS: ICD-10-CM

## 2023-04-18 DIAGNOSIS — I25.10 CORONARY ARTERY DISEASE INVOLVING NATIVE CORONARY ARTERY OF NATIVE HEART WITHOUT ANGINA PECTORIS: Primary | ICD-10-CM

## 2023-04-18 DIAGNOSIS — I10 ESSENTIAL HYPERTENSION: ICD-10-CM

## 2023-04-18 DIAGNOSIS — I25.5 ISCHEMIC CARDIOMYOPATHY: ICD-10-CM

## 2023-04-18 PROCEDURE — 99214 OFFICE O/P EST MOD 30 MIN: CPT | Performed by: NURSE PRACTITIONER

## 2023-04-18 PROCEDURE — 93000 ELECTROCARDIOGRAM COMPLETE: CPT | Performed by: NURSE PRACTITIONER

## 2023-04-18 RX ORDER — ATORVASTATIN CALCIUM 40 MG/1
80 TABLET, FILM COATED ORAL DAILY
COMMUNITY
End: 2023-04-18 | Stop reason: SDUPTHER

## 2023-04-18 RX ORDER — ATORVASTATIN CALCIUM 40 MG/1
80 TABLET, FILM COATED ORAL DAILY
Qty: 90 TABLET | Refills: 3
Start: 2023-04-18

## 2023-04-18 RX ORDER — CLOPIDOGREL BISULFATE 75 MG/1
75 TABLET ORAL DAILY
Qty: 90 TABLET | Refills: 3
Start: 2023-04-18

## 2023-04-18 NOTE — PROGRESS NOTES
Cardiology Office Follow Up Visit      Primary Care Provider:  Mingo Barton MD    Reason for f/u:     Coronary artery disease  Hypertension by history  History of LV apical thrombus  Ischemic cardiomyopathy      Subjective     CC:    Denies worsening chest pain or dyspnea    History of Present Illness       Damien Peña is a 62 y.o. male..Patient is a pleasant 62-year-old male who presents today for follow-up after not being seen in our office since July 2021.  He is known to have coronary artery disease requiring previous PCI, cardiomyopathy, LV apical thrombus, renal artery thromboembolism, dyslipidemia.    In 2014 the patient was diagnosed with coronary artery disease and acute coronary syndrome.  He underwent cardiac catheterization and PCI this was done in [place].  He had similar symptoms in 2016 and had repeat cardiac catheterization and angioplasty also in [place].    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  He underwent echocardiogram which demonstrated ejection fraction of 45 to 50%.  There was an apical thrombus noted in the left ventricle.  There was wall motion abnormalities involving the apex and septum.  He was started on Eliquis.    In September 2020 the patient had a stress Myoview that showed a large anterior and apical myocardial infarction but no ischemia.  Ejection fraction was 50% by repeat echocardiogram at that time with hypokinesis of the mid to distal septum and apex and no apical thrombus noted.    In July 2021 the patient was admitted to [facility] in [place] with symptoms of a TIA and stroke.  He unfortunately had stopped taking his Eliquis.  This was resumed at that time.    The patient has previously had to stop Toprol due to bradycardia and lisinopril was discontinued due to periods of hypotension and orthostasis.    Currently the patient denies any  chest pain with activity or at rest.  He complains of chronic dyspnea with exertion but reports is unchanged.  He has had no PND, orthopnea, palpitations, near syncope, lower extremity edema or feelings of his heart racing.  He reports compliance with medical therapy.    He reports that since he was seen in our office last he had a stress test and echocardiogram done at the [facility] removal for follow-up and was told that they were normal.            ASSESSMENT/PLAN:      Diagnoses and all orders for this visit:    1. Coronary artery disease involving native coronary artery of native heart without angina pectoris (Primary)  Comments:  Patient denies any chest pain  Orders:  -     ECG 12 Lead  -     apixaban (Eliquis) 5 MG tablet tablet; Take 1 tablet by mouth twice daily  Dispense: 180 tablet; Refill: 3  -     atorvastatin (LIPITOR) 40 MG tablet; Take 2 tablets by mouth Daily.  Dispense: 90 tablet; Refill: 3  -     clopidogrel (PLAVIX) 75 MG tablet; Take 1 tablet by mouth Daily.  Dispense: 90 tablet; Refill: 3    2. Essential hypertension  Comments:  Has not previously tolerated any guideline directed medical therapy for cardiomyopathy due to low blood pressure  Orders:  -     apixaban (Eliquis) 5 MG tablet tablet; Take 1 tablet by mouth twice daily  Dispense: 180 tablet; Refill: 3  -     clopidogrel (PLAVIX) 75 MG tablet; Take 1 tablet by mouth Daily.  Dispense: 90 tablet; Refill: 3    3. Left ventricular apical thrombus  Comments:  Remains on anticoagulation with Eliquis    4. Ischemic cardiomyopathy  Comments:  Ejection fraction by last assessment approximately 45%            MEDICAL DECISION MAKING:      We will try to obtain his records from the [facility] in regards to his stress test and echo last year.    Patient's blood pressure today and blood pressure reported at home does not support reinstituting ACE or beta-blocker.  Twelve-lead EKG shows sinus rhythm with a heart rate of 71.  There are anterior  deep T wave inversions similar to prior EKGs.    Patient is not having any symptoms to suggest unstable angina.    I made no changes in his medical therapy at this time.    We will plan on seeing him back for scheduled follow-up in 6 months.    The patient is asking questions about medication for erectile dysfunction.  He has not had recent follow-up with his PCP.  I have advised him to consider having a follow-up for evaluation there and possible urology evaluation.    If the patient develops any new or worsening problems he has been advised to contact the office sooner.  Have scheduled him for follow-up with Dr. Campos in 6 months      Past Medical History:   Diagnosis Date   • Bradycardia    • CAD (coronary artery disease)    • Chronic kidney disease    • Hypertension    • Tobacco abuse        Past Surgical History:   Procedure Laterality Date   • APPENDECTOMY     • CARDIAC CATHETERIZATION     • COLONOSCOPY     • CORONARY STENT PLACEMENT     • ECHO - CONVERTED  2020         Current Outpatient Medications:   •  apixaban (Eliquis) 5 MG tablet tablet, Take 1 tablet by mouth twice daily, Disp: 180 tablet, Rfl: 3  •  atorvastatin (LIPITOR) 40 MG tablet, Take 2 tablets by mouth Daily., Disp: 90 tablet, Rfl: 3  •  clopidogrel (PLAVIX) 75 MG tablet, Take 1 tablet by mouth Daily., Disp: 90 tablet, Rfl: 3    Social History     Socioeconomic History   • Marital status: Single   Tobacco Use   • Smoking status: Some Days     Packs/day: 1.50     Years: 30.00     Pack years: 45.00     Types: Cigarettes     Last attempt to quit: 2021     Years since quittin.7   • Smokeless tobacco: Never   • Tobacco comments:     Patient is advised to quit smoking.   Vaping Use   • Vaping Use: Never used   Substance and Sexual Activity   • Alcohol use: Yes     Comment: beer occasionally   • Drug use: Never   • Sexual activity: Yes     Partners: Female       Family History   Problem Relation Age of Onset   • Glaucoma Mother    • Colon  "cancer Father    • Stroke Father         pacemaker   • Heart disease Maternal Uncle    • Heart disease Maternal Uncle        The following portions of the patient's history were reviewed and updated as appropriate: allergies, current medications, past family history, past medical history, past social history, past surgical history and problem list.    Review of Systems   Constitutional: Positive for malaise/fatigue.   Cardiovascular: Positive for dyspnea on exertion.   Genitourinary: Positive for decreased libido.   All other systems reviewed and are negative.      Pertinent items are noted in HPI, all other systems reviewed and negative    /79 (BP Location: Right arm, Patient Position: Sitting, Cuff Size: Large Adult)   Pulse 71   Ht 172.7 cm (68\")   Wt 57.6 kg (127 lb)   SpO2 95%   BMI 19.31 kg/m² .  Objective     Vitals reviewed.   Constitutional:       General: Not in acute distress.     Appearance: Normal appearance. Well-developed.   Eyes:      Pupils: Pupils are equal, round, and reactive to light.   HENT:      Head: Normocephalic and atraumatic.   Neck:      Vascular: No JVD.   Pulmonary:      Effort: Pulmonary effort is normal.      Breath sounds: Normal breath sounds.   Cardiovascular:      Normal rate. Regular rhythm.   Edema:     Peripheral edema absent.   Abdominal:      General: There is no distension.      Palpations: Abdomen is soft.      Tenderness: There is no abdominal tenderness.   Musculoskeletal: Normal range of motion.      Cervical back: Normal range of motion and neck supple. Skin:     General: Skin is warm and dry.   Neurological:      Mental Status: Alert and oriented to person, place, and time.             ECG 12 Lead    Date/Time: 4/18/2023 9:13 AM  Performed by: Kendy Herrera APRN  Authorized by: Kendy Herrera APRN   Rhythm: sinus rhythm  BPM: 71  Conduction: left anterior fascicular block  Q waves: V1, V2 and V3    T inversion: V1, V2, V3 and V4  Other findings: low " voltage    Clinical impression: abnormal EKG            EKG ordered by and reviewed by me in office

## 2023-04-19 NOTE — TELEPHONE ENCOUNTER
Barry with [facility] physical therapy, called needing a verbal order to continue care.      Barry [phone]  
Called Barry with [facility] and gave verbal okay to continue PT.    
Caller: BHAVESH CUBA    Relationship to patient: Brother/Sister    Best call back number: [phone]    New or established patient?  [x] New  [] Established    Date of discharge: 7/11/21    Facility discharged from:  [facility]    Diagnosis/Symptoms: CVA    Length of stay (If applicable): 3 DAYS; ADMITTED ON 7/8/21    Specialty Only: Did you see a [facility] provider?    [x] Yes  [] No    If so, who? DR. JONES & TRUPTI SMALLS    PT'S SISTER CALLED TO SCHEDULE BUT WAS UNSURE OF THE TIMEFRAME PT SHOULD F/U, AS ONE PART OF DISCHARGE NOTE INDICATED 1 MONTH, AND ANOTHER PART INDICATED 3 MONTHS.    PLEASE REVIEW AND ADVISE.  
Car

## 2023-07-25 DIAGNOSIS — I25.10 CORONARY ARTERY DISEASE INVOLVING NATIVE CORONARY ARTERY OF NATIVE HEART WITHOUT ANGINA PECTORIS: ICD-10-CM

## 2023-07-25 DIAGNOSIS — I10 ESSENTIAL HYPERTENSION: ICD-10-CM

## 2023-07-25 RX ORDER — ATORVASTATIN CALCIUM 40 MG/1
80 TABLET, FILM COATED ORAL DAILY
Qty: 90 TABLET | Refills: 3
Start: 2023-07-25 | End: 2023-07-28 | Stop reason: SDUPTHER

## 2023-07-25 RX ORDER — CLOPIDOGREL BISULFATE 75 MG/1
75 TABLET ORAL DAILY
Qty: 90 TABLET | Refills: 3
Start: 2023-07-25 | End: 2023-07-28 | Stop reason: SDUPTHER

## 2023-07-25 NOTE — TELEPHONE ENCOUNTER
PLEASE SEND PRESCRIPTIONS TO BELOW LISTED PHARMACY         Caller: Damien Peña    Relationship: Self    Best call back number: [phone]    Requested Prescriptions:   Requested Prescriptions     Pending Prescriptions Disp Refills    atorvastatin (LIPITOR) 40 MG tablet 90 tablet 3     Sig: Take 2 tablets by mouth Daily.    clopidogrel (PLAVIX) 75 MG tablet 90 tablet 3     Sig: Take 1 tablet by mouth Daily.    apixaban (Eliquis) 5 MG tablet tablet 180 tablet 3     Sig: Take 1 tablet by mouth twice daily        Pharmacy where request should be sent: [facility]/PHARMACY #[id number] [place] [address] [facility] AT [facility] [phone] [facility] [phone]      Last office visit with prescribing clinician: 7/28/2021   Last telemedicine visit with prescribing clinician: Visit date not found   Next office visit with prescribing clinician: 10/11/2023         Does the patient have less than a 3 day supply:  [x] Yes  [] No    Would you like a call back once the refill request has been completed: [x] Yes [] No    If the office needs to give you a call back, can they leave a voicemail: [x] Yes [] No    Kranthi Petty Rep   07/25/23 14:34 EDT

## 2023-07-28 DIAGNOSIS — I25.10 CORONARY ARTERY DISEASE INVOLVING NATIVE CORONARY ARTERY OF NATIVE HEART WITHOUT ANGINA PECTORIS: ICD-10-CM

## 2023-07-28 DIAGNOSIS — I10 ESSENTIAL HYPERTENSION: ICD-10-CM

## 2023-07-28 RX ORDER — ATORVASTATIN CALCIUM 40 MG/1
80 TABLET, FILM COATED ORAL DAILY
Qty: 90 TABLET | Refills: 3
Start: 2023-07-28 | End: 2023-07-31

## 2023-07-28 RX ORDER — CLOPIDOGREL BISULFATE 75 MG/1
75 TABLET ORAL DAILY
Qty: 90 TABLET | Refills: 3
Start: 2023-07-28 | End: 2023-07-31

## 2023-07-31 RX ORDER — CLOPIDOGREL BISULFATE 75 MG/1
TABLET ORAL
Qty: 90 TABLET | Refills: 2 | Status: SHIPPED | OUTPATIENT
Start: 2023-07-31

## 2023-07-31 RX ORDER — ATORVASTATIN CALCIUM 40 MG/1
TABLET, FILM COATED ORAL
Qty: 90 TABLET | Refills: 3 | Status: SHIPPED | OUTPATIENT
Start: 2023-07-31

## 2023-10-10 NOTE — PROGRESS NOTES
Date of Office Visit: 10/11/2023  Encounter Provider: Dr. Ra Campos  Place of Service: [facility] [place]  Patient Name: Damien Peña  :1960  Mingo Barton MD    Chief Complaint   Patient presents with    Coronary Artery Disease    Slow Heart Rate    Cardiomyopathy    Hypertension    Follow-up     History of Present Illness:    I am pleased to see Mr. Charlton in my office today as a follow-up.     As you know, patient is 63-year-old white gentleman whose past medical history significant for CAD, coronary artery stenting, cardiomyopathy, renal artery thromboembolism, who came today for follow-up.     In , patient was diagnosed with CAD with acute coronary artery syndrome.  Patient underwent coronary artery stenting.  Patient had similar symptoms in  and had repeat cardiac catheterization and angioplasty.  Details of these procedures are not available to me.     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  Patient underwent echocardiogram which showed EF of 45 to 50%.  Apical thrombus was noted in left ventricle.  Patient had wall motion abnormality involving apex and septum.  Patient was started on Eliquis.     In 2020, patient underwent stress test which showed large anterior and apical myocardial infarction but no ischemia was noted.  Echocardiogram showed EF of 50% and hypokinesis of mid to distal septum and apex was noted.  No apical thrombus was noted.     In 2021, patient was admitted at [facility] with symptom of TIA/stroke.  Unfortunately because of unknown reason to me, patient just stopped taking Eliquis.  Patient is high risk to form ventricular thrombus because of apical infarct and scarring.  Patient recovered from his symptoms.  Patient is restarted on Eliquis and Plavix..    Patient came today for follow-up.   Patient denies any symptom of chest pain or tightness or heaviness.  No orthopnea PND no syncope or presyncope.  Patient does have shortness of breath on exertion.  Patient does not have palpitation.    EKG showed normal sinus rhythm.  Poor progression of R wave noted.  T wave inversion noted in lateral and anterior leads but these are old changes.    Patient is overall doing well.  I will continue current treatment.  Patient is advised to proceed with CBC, CMP and lipid panel testing.      Past Medical History:   Diagnosis Date    Bradycardia     CAD (coronary artery disease)     Chronic kidney disease     Hypertension     Tobacco abuse          Past Surgical History:   Procedure Laterality Date    APPENDECTOMY      CARDIAC CATHETERIZATION      COLONOSCOPY      CORONARY STENT PLACEMENT      ECHO - CONVERTED             Current Outpatient Medications:     apixaban (Eliquis) 5 MG tablet tablet, TAKE 1 TABLET BY MOUTH TWICE A DAY, Disp: 180 tablet, Rfl: 3    atorvastatin (LIPITOR) 40 MG tablet, Take 1 tablet by mouth Every Night., Disp: 90 tablet, Rfl: 3    clopidogrel (PLAVIX) 75 MG tablet, Take 1 tablet by mouth Daily., Disp: 90 tablet, Rfl: 2      Social History     Socioeconomic History    Marital status: Single   Tobacco Use    Smoking status: Every Day     Packs/day: 1.50     Years: 30.00     Additional pack years: 0.00     Total pack years: 45.00     Types: Cigarettes     Last attempt to quit: 2021     Years since quittin.2    Smokeless tobacco: Never    Tobacco comments:     Patient is advised to quit smoking.   Vaping Use    Vaping Use: Never used   Substance and Sexual Activity    Alcohol use: Yes     Comment: beer occasionally    Drug use: Never    Sexual activity: Yes     Partners: Female         Review of Systems   Constitutional: Negative for chills and fever.   HENT:  Negative for ear discharge and nosebleeds.    Eyes:  Negative for discharge and redness.   Cardiovascular:  Negative for  "chest pain, orthopnea, palpitations, paroxysmal nocturnal dyspnea and syncope.   Respiratory:  Negative for cough, shortness of breath and wheezing.    Endocrine: Negative for heat intolerance.   Skin:  Negative for rash.   Musculoskeletal:  Negative for arthritis and myalgias.   Gastrointestinal:  Negative for abdominal pain, melena, nausea and vomiting.   Genitourinary:  Negative for dysuria and hematuria.   Neurological:  Negative for dizziness, light-headedness, numbness and tremors.   Psychiatric/Behavioral:  Negative for depression. The patient is not nervous/anxious.        Procedures      ECG 12 Lead    Date/Time: 10/11/2023 9:44 AM  Performed by: Ra Campos MD    Authorized by: Ra Campos MD  Comparison: compared with previous ECG   Similar to previous ECG  Rhythm: sinus rhythm  Other findings: poor R wave progression    Clinical impression: abnormal EKG          ECG 12 Lead    (Results Pending)           Objective:    /71 (BP Location: Right arm, Patient Position: Sitting, Cuff Size: Adult)   Pulse 62   Ht 172.7 cm (67.99\")   Wt 57.6 kg (127 lb)   SpO2 96%   BMI 19.31 kg/mý         Constitutional:       Appearance: Well-developed.   Eyes:      General: No scleral icterus.        Right eye: No discharge.   HENT:      Head: Normocephalic and atraumatic.   Neck:      Thyroid: No thyromegaly.      Lymphadenopathy: No cervical adenopathy.   Pulmonary:      Effort: Pulmonary effort is normal. No respiratory distress.      Breath sounds: Normal breath sounds. No wheezing. No rales.   Cardiovascular:      Normal rate. Regular rhythm.      No gallop.    Edema:     Peripheral edema absent.   Abdominal:      Tenderness: There is no abdominal tenderness.   Skin:     Findings: No erythema or rash.   Neurological:      Mental Status: Alert and oriented to person, place, and time.             Assessment:       Diagnosis Plan   1. Coronary artery disease involving native coronary artery of native heart " without angina pectoris  ECG 12 Lead    apixaban (Eliquis) 5 MG tablet tablet    atorvastatin (LIPITOR) 40 MG tablet    clopidogrel (PLAVIX) 75 MG tablet      2. Bradycardia, sinus  ECG 12 Lead      3. Essential hypertension  ECG 12 Lead    apixaban (Eliquis) 5 MG tablet tablet    clopidogrel (PLAVIX) 75 MG tablet      4. Ischemic cardiomyopathy  ECG 12 Lead      5. Coronary artery disease involving native coronary artery of native heart without angina pectoris  ECG 12 Lead    apixaban (Eliquis) 5 MG tablet tablet    atorvastatin (LIPITOR) 40 MG tablet    clopidogrel (PLAVIX) 75 MG tablet    Patient denies any chest pain      6. Essential hypertension  ECG 12 Lead    apixaban (Eliquis) 5 MG tablet tablet    clopidogrel (PLAVIX) 75 MG tablet    Has not previously tolerated any guideline directed medical therapy for cardiomyopathy due to low blood pressure               Plan:       MDM:    1.  CAD:    Patient is overall doing fairly well from a cardiovascular standpoint.  I will continue current treatment.  Patient may need repeat echocardiogram in future    2.  Ischemic cardiomyopathy:    Last EF was 50%.  Continue to observe.  Patient has bradycardia and therefore no beta-blockers are given.  Consider ACE inhibitor in future    3.  Hypertension:    Patient blood pressure is stable there is no antihypertensive.  Medications.    Hyperlipidemia:    Patient is advised to proceed with lipid panel testing

## 2023-10-11 ENCOUNTER — OFFICE VISIT (OUTPATIENT)
Dept: CARDIOLOGY | Facility: CLINIC | Age: 63
End: 2023-10-11
Payer: OTHER GOVERNMENT

## 2023-10-11 VITALS
BODY MASS INDEX: 19.25 KG/M2 | WEIGHT: 127 LBS | OXYGEN SATURATION: 96 % | SYSTOLIC BLOOD PRESSURE: 123 MMHG | HEART RATE: 62 BPM | HEIGHT: 68 IN | DIASTOLIC BLOOD PRESSURE: 71 MMHG

## 2023-10-11 DIAGNOSIS — I25.10 CORONARY ARTERY DISEASE INVOLVING NATIVE CORONARY ARTERY OF NATIVE HEART WITHOUT ANGINA PECTORIS: ICD-10-CM

## 2023-10-11 DIAGNOSIS — I10 ESSENTIAL HYPERTENSION: ICD-10-CM

## 2023-10-11 DIAGNOSIS — R00.1 BRADYCARDIA, SINUS: ICD-10-CM

## 2023-10-11 DIAGNOSIS — I25.5 ISCHEMIC CARDIOMYOPATHY: ICD-10-CM

## 2023-10-11 DIAGNOSIS — I25.10 CORONARY ARTERY DISEASE INVOLVING NATIVE CORONARY ARTERY OF NATIVE HEART WITHOUT ANGINA PECTORIS: Primary | ICD-10-CM

## 2023-10-11 PROCEDURE — 99214 OFFICE O/P EST MOD 30 MIN: CPT | Performed by: INTERNAL MEDICINE

## 2023-10-11 PROCEDURE — 93000 ELECTROCARDIOGRAM COMPLETE: CPT | Performed by: INTERNAL MEDICINE

## 2023-10-11 RX ORDER — ATORVASTATIN CALCIUM 40 MG/1
40 TABLET, FILM COATED ORAL NIGHTLY
Qty: 90 TABLET | Refills: 3 | Status: SHIPPED | OUTPATIENT
Start: 2023-10-11

## 2023-10-11 RX ORDER — CLOPIDOGREL BISULFATE 75 MG/1
75 TABLET ORAL DAILY
Qty: 90 TABLET | Refills: 2 | Status: SHIPPED | OUTPATIENT
Start: 2023-10-11

## 2023-11-20 DIAGNOSIS — I10 ESSENTIAL HYPERTENSION: ICD-10-CM

## 2023-11-20 DIAGNOSIS — I25.10 CORONARY ARTERY DISEASE INVOLVING NATIVE CORONARY ARTERY OF NATIVE HEART WITHOUT ANGINA PECTORIS: ICD-10-CM

## 2023-11-20 NOTE — TELEPHONE ENCOUNTER
Caller: Mariana Damien WATERMAN    Relationship: Self    Best call back number: [phone]    Requested Prescriptions:   Requested Prescriptions     Pending Prescriptions Disp Refills    apixaban (Eliquis) 5 MG tablet tablet 180 tablet 3     Sig: TAKE 1 TABLET BY MOUTH TWICE A DAY        Pharmacy where request should be sent: [facility]/PHARMACY #[id number] [facility] [address] [facility]. AT [facility] [phone] [facility] [phone]      Last office visit with prescribing clinician: 10/11/2023   Last telemedicine visit with prescribing clinician: Visit date not found   Next office visit with prescribing clinician: 7/10/2024     Additional details provided by patient: PT HAS ABOUT A WEEK LEFT OF MEDICATION     Does the patient have less than a 3 day supply:  [] Yes  [x] No    Would you like a call back once the refill request has been completed: [x] Yes [] No    If the office needs to give you a call back, can they leave a voicemail: [x] Yes [] No    Kranthi Price Rep   11/20/23 09:59 EST

## 2024-03-22 ENCOUNTER — OFFICE VISIT (OUTPATIENT)
Dept: INTERNAL MEDICINE | Facility: CLINIC | Age: 64
End: 2024-03-22
Payer: COMMERCIAL

## 2024-03-22 ENCOUNTER — LAB (OUTPATIENT)
Dept: LAB | Facility: HOSPITAL | Age: 64
End: 2024-03-22
Payer: COMMERCIAL

## 2024-03-22 VITALS
HEIGHT: 68 IN | SYSTOLIC BLOOD PRESSURE: 126 MMHG | DIASTOLIC BLOOD PRESSURE: 78 MMHG | TEMPERATURE: 98 F | BODY MASS INDEX: 19.02 KG/M2 | WEIGHT: 125.5 LBS | OXYGEN SATURATION: 98 % | HEART RATE: 68 BPM

## 2024-03-22 DIAGNOSIS — Z00.00 HEALTHCARE MAINTENANCE: ICD-10-CM

## 2024-03-22 DIAGNOSIS — Z72.0 TOBACCO ABUSE: ICD-10-CM

## 2024-03-22 DIAGNOSIS — Z12.5 PROSTATE CANCER SCREENING: ICD-10-CM

## 2024-03-22 DIAGNOSIS — I25.10 CORONARY ARTERY DISEASE INVOLVING NATIVE CORONARY ARTERY OF NATIVE HEART WITHOUT ANGINA PECTORIS: Primary | ICD-10-CM

## 2024-03-22 DIAGNOSIS — Z12.11 COLON CANCER SCREENING: ICD-10-CM

## 2024-03-22 LAB
ALBUMIN SERPL-MCNC: 4.4 G/DL (ref 3.5–5.2)
ALBUMIN/GLOB SERPL: 2 G/DL
ALP SERPL-CCNC: 69 U/L (ref 39–117)
ALT SERPL W P-5'-P-CCNC: 17 U/L (ref 1–41)
ANION GAP SERPL CALCULATED.3IONS-SCNC: 8 MMOL/L (ref 5–15)
AST SERPL-CCNC: 16 U/L (ref 1–40)
BILIRUB SERPL-MCNC: 1.1 MG/DL (ref 0–1.2)
BUN SERPL-MCNC: 13 MG/DL (ref 8–23)
BUN/CREAT SERPL: 8.6 (ref 7–25)
CALCIUM SPEC-SCNC: 9.4 MG/DL (ref 8.6–10.5)
CHLORIDE SERPL-SCNC: 105 MMOL/L (ref 98–107)
CHOLEST SERPL-MCNC: 113 MG/DL (ref 0–200)
CO2 SERPL-SCNC: 27 MMOL/L (ref 22–29)
CREAT SERPL-MCNC: 1.51 MG/DL (ref 0.76–1.27)
EGFRCR SERPLBLD CKD-EPI 2021: 51.9 ML/MIN/1.73
GLOBULIN UR ELPH-MCNC: 2.2 GM/DL
GLUCOSE SERPL-MCNC: 95 MG/DL (ref 65–99)
HCV AB SER DONR QL: NORMAL
HDLC SERPL-MCNC: 52 MG/DL (ref 40–60)
LDLC SERPL CALC-MCNC: 48 MG/DL (ref 0–100)
LDLC/HDLC SERPL: 0.94 {RATIO}
POTASSIUM SERPL-SCNC: 4.3 MMOL/L (ref 3.5–5.2)
PROT SERPL-MCNC: 6.6 G/DL (ref 6–8.5)
PSA SERPL-MCNC: 2.2 NG/ML (ref 0–4)
SODIUM SERPL-SCNC: 140 MMOL/L (ref 136–145)
TRIGL SERPL-MCNC: 61 MG/DL (ref 0–150)
VLDLC SERPL-MCNC: 13 MG/DL (ref 5–40)

## 2024-03-22 PROCEDURE — G0103 PSA SCREENING: HCPCS | Performed by: FAMILY MEDICINE

## 2024-03-22 PROCEDURE — 80053 COMPREHEN METABOLIC PANEL: CPT | Performed by: FAMILY MEDICINE

## 2024-03-22 PROCEDURE — 86803 HEPATITIS C AB TEST: CPT | Performed by: FAMILY MEDICINE

## 2024-03-22 PROCEDURE — 80061 LIPID PANEL: CPT | Performed by: FAMILY MEDICINE

## 2024-03-22 PROCEDURE — 36415 COLL VENOUS BLD VENIPUNCTURE: CPT | Performed by: FAMILY MEDICINE

## 2024-03-22 NOTE — PROGRESS NOTES
Subjective   Damien Peña is a 63 y.o. male.     Chief Complaint   Patient presents with    Annual Exam         History of Present Illness   Damien Peña 63 y.o. male who presents for an Annual Wellness Visit.  he has a history of   Patient Active Problem List   Diagnosis    Kidney infarction    Coronary artery disease involving native coronary artery of native heart without angina pectoris    Tobacco abuse    CKD (chronic kidney disease)    Bradycardia, sinus    Vertigo    Essential hypertension    Left ventricular apical thrombus    Cerebellar infarct    Colon cancer screening    Healthcare maintenance    Right arm weakness    Right leg weakness    Ischemic cardiomyopathy    Prostate cancer screening   .  he has been feeling well.   I  reviewed health maintenance with him as part of my preventative care plan.  Recommend regular dental and eye exams  The following portions of the patient's history were reviewed and updated as appropriate: allergies, current medications, past family history, past medical history, past social history, past surgical history, and problem list.    [facility] Low-Dose Lung Cancer CT Screening     Damien Peña is a 63 y.o.  male with whom I discussed Low-Dose Lung Cancer Screening today.     SMOKING HISTORY:   Social History     Tobacco Use   Smoking Status Every Day    Current packs/day: 0.00    Average packs/day: 1.5 packs/day for 42.5 years (63.7 ttl pk-yrs)    Types: Cigarettes    Start date: 1979    Last attempt to quit: 2021    Years since quittin.7   Smokeless Tobacco Never   Tobacco Comments    Patient is advised to quit smoking.       Damien Peña is currently smoking 1 pack(s) per day with a 45 pack year history.  he reports no use of alternate forms of tobacco, electronic cigarettes, marijuana or other substances.  Based on the recommendation of the [place] Preventive Services Task Force, this patient is at high risk for  lung cancer and a low-dose CT screening scan is recommended.     We discussed the connection between Radon and Lung Cancer and the availability of free test kits. Mr. Peña has had  second-hand smoke exposure as a child with smoking in their home.    The patient has had no hemoptysis, unintentional weight loss or increasing shortness of breath. The patient is asymptomatic and has no signs or symptoms of lung cancer.     Together we discussed the potential benefits and potential harms of being screened for lung cancer including the potential for follow up diagnostic testing, risk for over diagnosis, false positive rate and radiation exposure using the [facility] standardized decision aid. We reviewed the patient's smoking history and counseled on the importance and health benefits of maintaining cigarette smoking abstinence.      Smoking Abstinence  DISCUSSION HELD TODAY:     We discussed that there are a number of resources and interventions to assist with smoking cessation if needed in the future including the 1-800-Quit Now line, Health Department programs, [place] [facility] resources, and use of the U.[place] Department of Health and Human Services five keys for quitting and quit plan worksheet.   Recommendations for continued lung cancer screening:      We discussed the NCCN guidelines for lung cancer screening and the patient verbalized understanding that annual screening is recommended until fifteen years beyond smoking as long as they have no other disease or comorbidity that would prevent them from receiving cancer treatments such as surgery should a lung cancer be detected. The [facility] Lung Cancer Screening Shared Decision-Making Tool was utilized as an aid in discussing whether or not screening was right. The patient has decided to proceed with a Low Dose Lung Cancer Screening CT today. The patient is aware that the results of his screening will be shared with the referring  provider or PCP as well.       The patient verbalizes understanding that results of this low dose lung CT exam will be called to him and that assistance will be provided in arranging any necessary follow-up.    After review of the NCCN guidelines and recommendations for ongoing screening, the patient verbalized understanding of recommendations for follow-up. We discussed the importance of continued annual screening until 15 years beyond smoking or until other life-limiting comorbidities are present. We discussed the impact of comorbidities and ability and willing to undergo diagnosis and treatment.    3 minutes out of 30 minutes face-to-face spent counseling patient on the continued health benefits of having quit tobacco, maintaining smoking abstinence, smoking cessation strategies and resources, as well as the importance of adherence to annual lung cancer low-dose CT screening.     Review of Systems   Constitutional:  Negative for appetite change, fever and unexpected weight change.   HENT:  Negative for ear pain, facial swelling and sore throat.    Eyes:  Negative for pain and visual disturbance.   Respiratory:  Negative for chest tightness, shortness of breath and wheezing.    Cardiovascular:  Negative for chest pain and palpitations.   Gastrointestinal:  Negative for abdominal pain and blood in stool.   Endocrine: Negative.    Genitourinary:  Negative for difficulty urinating and hematuria.   Musculoskeletal:  Negative for joint swelling.   Neurological:  Negative for tremors, seizures and syncope.   Hematological:  Negative for adenopathy.   Psychiatric/Behavioral: Negative.         Objective   Physical Exam  Vitals and nursing note reviewed.   Constitutional:       Appearance: Normal appearance. He is well-developed. He is not diaphoretic.   HENT:      Head: Normocephalic and atraumatic.      Right Ear: Tympanic membrane, ear canal and external ear normal.      Left Ear: Tympanic membrane, ear canal and  external ear normal.   Eyes:      General: Lids are normal. No scleral icterus.     Extraocular Movements: Extraocular movements intact.      Conjunctiva/sclera: Conjunctivae normal.   Neck:      Thyroid: No thyroid mass or thyromegaly.      Vascular: No carotid bruit or JVD.   Cardiovascular:      Rate and Rhythm: Normal rate and regular rhythm.      Pulses: Normal pulses.           Radial pulses are 2+ on the right side and 2+ on the left side.      Heart sounds: Normal heart sounds. No murmur heard.  Pulmonary:      Effort: Pulmonary effort is normal. No respiratory distress.      Breath sounds: Normal breath sounds.   Abdominal:      Palpations: Abdomen is soft.      Tenderness: There is no right CVA tenderness or left CVA tenderness.   Musculoskeletal:      Cervical back: Normal range of motion.      Right lower leg: No edema.      Left lower leg: No edema.   Skin:     General: Skin is warm and dry.      Coloration: Skin is not pale.      Findings: No erythema or rash.   Neurological:      General: No focal deficit present.      Mental Status: He is alert and oriented to person, place, and time.      Sensory: No sensory deficit.      Deep Tendon Reflexes: Reflexes are normal and symmetric.   Psychiatric:         Mood and Affect: Mood normal.         Behavior: Behavior normal. Behavior is cooperative.         Thought Content: Thought content normal.         Judgment: Judgment normal.         Assessment & Plan   Diagnoses and all orders for this visit:    1. Coronary artery disease involving native coronary artery of native heart without angina pectoris (Primary)  -     Lipid Panel    2. Colon cancer screening  -     Ambulatory Referral For Screening Colonoscopy    3. Tobacco abuse  -     CT Chest Low Dose Wo; Future    4. Healthcare maintenance  -     Comprehensive Metabolic Panel  -     Hepatitis C Antibody    5. Prostate cancer screening  -     PSA Screen        Continue healthy lifestyle calorie appropriate  diet and regular physical activity  Recommend smoking cessation  Education provided regarding prevention of serious illness patient declines immunizations  Education provided regarding early detection screening testing has been ordered  Follow-up ongoing management of chronic problems otherwise preventively annually

## 2024-04-18 ENCOUNTER — TELEPHONE (OUTPATIENT)
Dept: INTERNAL MEDICINE | Facility: CLINIC | Age: 64
End: 2024-04-18
Payer: OTHER GOVERNMENT

## 2024-04-18 NOTE — TELEPHONE ENCOUNTER
Per Alyssa on the  Auth team, the CT low dose [facility]. I will contact the [facility] ( [phone]) for the fax # or website info to submit request.

## 2024-04-19 ENCOUNTER — TELEPHONE (OUTPATIENT)
Dept: INTERNAL MEDICINE | Facility: CLINIC | Age: 64
End: 2024-04-19
Payer: OTHER GOVERNMENT

## 2024-04-19 NOTE — TELEPHONE ENCOUNTER
Comments    I called the [facility] dept and advised them that the request is being resent to [facility] by [facility] and they are going to continue to monitor the status.

## 2024-04-25 ENCOUNTER — TELEPHONE (OUTPATIENT)
Dept: INTERNAL MEDICINE | Facility: CLINIC | Age: 64
End: 2024-04-25
Payer: OTHER GOVERNMENT

## 2024-04-25 NOTE — TELEPHONE ENCOUNTER
4/25/24 PER AGUEDA, CHALO FROM [facility] CALLED TO SPEAK WITH ME THIS MORNING. I CALLED THE [employer] BACK AND SPOKE TO RONALDO, SHE MESSAGED CHALO TO ADVISE ON HER CALL SHE MADE HERE EARLIER. CHALO STATED IT WAS REGARDING THE AUTH THAT NEEDS TO BE OBTAINED BY OUR OFFICE FOR THE [facility] TO COVER THE CT LOW DOSE CANCER SCREENING THAT THE PT HAS AN APPT FOR TOMORROW. THE AUTH PAPERWORK HAD BEEN SUBMITTED BY SINGH THE MA LAST WEEK.  I DO NOT HAVE ACCESS TO OBTAIN THE STATUS ON [facility] AUTH REQUESTS. RONALDO STATED THAT THEY  HAVE TIL 3:00 PM FOR THE AUTH TO BE GIVEN BEFORE THE APPT HAS TO BE CANCELED. SINGH IS OFF TODAY SO I WILL HAVE TO FOLLOW UP WITH HER TOMORROW. ADVISED [facility] IF THIS APPT HAS TO BE RESCHEDULED THAT IS OK.

## 2024-06-27 ENCOUNTER — TELEPHONE (OUTPATIENT)
Dept: CARDIOLOGY | Facility: CLINIC | Age: 64
End: 2024-06-27
Payer: OTHER GOVERNMENT

## 2024-06-27 DIAGNOSIS — I25.10 CORONARY ARTERY DISEASE INVOLVING NATIVE CORONARY ARTERY OF NATIVE HEART WITHOUT ANGINA PECTORIS: ICD-10-CM

## 2024-06-27 DIAGNOSIS — I10 ESSENTIAL HYPERTENSION: ICD-10-CM

## 2024-06-27 NOTE — TELEPHONE ENCOUNTER
Patient said that he was only given a 60 day refill on his eliquis last time and that he will run out before his 9/3 appt. Please send in a refill for him.

## 2024-07-10 ENCOUNTER — TELEPHONE (OUTPATIENT)
Dept: CARDIOLOGY | Facility: CLINIC | Age: 64
End: 2024-07-10

## 2024-07-10 NOTE — TELEPHONE ENCOUNTER
Patient called and stated that he gets a stress test every two years. Can you check with Dr. Campos to see if the patient needs one, there is no order for one. 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

## 2024-07-11 ENCOUNTER — OFFICE VISIT (OUTPATIENT)
Dept: CARDIOLOGY | Facility: CLINIC | Age: 64
End: 2024-07-11
Payer: COMMERCIAL

## 2024-07-11 VITALS
HEART RATE: 65 BPM | DIASTOLIC BLOOD PRESSURE: 84 MMHG | SYSTOLIC BLOOD PRESSURE: 124 MMHG | RESPIRATION RATE: 16 BRPM | WEIGHT: 118 LBS | HEIGHT: 68 IN | BODY MASS INDEX: 17.88 KG/M2 | OXYGEN SATURATION: 98 %

## 2024-07-11 DIAGNOSIS — I10 ESSENTIAL HYPERTENSION: ICD-10-CM

## 2024-07-11 DIAGNOSIS — I25.10 CORONARY ARTERY DISEASE INVOLVING NATIVE CORONARY ARTERY OF NATIVE HEART WITHOUT ANGINA PECTORIS: Primary | ICD-10-CM

## 2024-07-11 DIAGNOSIS — I25.10 CORONARY ARTERY DISEASE INVOLVING NATIVE CORONARY ARTERY OF NATIVE HEART WITHOUT ANGINA PECTORIS: ICD-10-CM

## 2024-07-11 DIAGNOSIS — I25.5 ISCHEMIC CARDIOMYOPATHY: ICD-10-CM

## 2024-07-11 DIAGNOSIS — R00.1 BRADYCARDIA, SINUS: ICD-10-CM

## 2024-07-11 PROCEDURE — 99214 OFFICE O/P EST MOD 30 MIN: CPT | Performed by: INTERNAL MEDICINE

## 2024-07-11 PROCEDURE — 93000 ELECTROCARDIOGRAM COMPLETE: CPT | Performed by: INTERNAL MEDICINE

## 2024-07-11 RX ORDER — ATORVASTATIN CALCIUM 40 MG/1
40 TABLET, FILM COATED ORAL NIGHTLY
Qty: 90 TABLET | Refills: 3 | Status: SHIPPED | OUTPATIENT
Start: 2024-07-11

## 2024-07-11 RX ORDER — CLOPIDOGREL BISULFATE 75 MG/1
75 TABLET ORAL DAILY
Qty: 90 TABLET | Refills: 2 | Status: SHIPPED | OUTPATIENT
Start: 2024-07-11

## 2024-07-11 NOTE — PROGRESS NOTES
Date of Office Visit: 2024  Encounter Provider: Dr. Ra Campos  Place of Service: [facility] CARDIOLOGY [place]  Patient Name: Damien Peña  :1960  Mingo Barton MD    Chief Complaint   Patient presents with    Coronary Artery Disease    Hypertension    Follow-up     History of Present Illness    I am pleased to see Mr. Charlton in my office today as a follow-up.     As you know, patient is 63-year-old white gentleman whose past medical history significant for CAD, coronary artery stenting, cardiomyopathy, renal artery thromboembolism, who came today for follow-up.     In , patient was diagnosed with CAD with acute coronary artery syndrome.  Patient underwent coronary artery stenting.  Patient had similar symptoms in  and had repeat cardiac catheterization and angioplasty.  Details of these procedures are not available to me.     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  Patient underwent echocardiogram which showed EF of 45 to 50%.  Apical thrombus was noted in left ventricle.  Patient had wall motion abnormality involving apex and septum.  Patient was started on Eliquis.     In 2020, patient underwent stress test which showed large anterior and apical myocardial infarction but no ischemia was noted.  Echocardiogram showed EF of 50% and hypokinesis of mid to distal septum and apex was noted.  No apical thrombus was noted.     In 2021, patient was admitted at [facility] with symptom of TIA/stroke.  Unfortunately because of unknown reason to me, patient just stopped taking Eliquis.  Patient is high risk to form ventricular thrombus because of apical infarct and scarring.  Patient recovered from his symptoms.  Patient is restarted on Eliquis and Plavix..    Patient came today for follow-up.  Patient is complaining of shortness of  breath but no chest pain.  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  Patient denies any symptom of chest pain.  No orthopnea PND no syncope or presyncope.    EKG showed normal sinus rhythm with anterior myocardial infarction.  T wave inversion noted no change from previous EKG    I will recommend to proceed with stress test and echocardiogram.  Continue current treatment.  Refills are given.  If the stress test is negative patient can be cleared for the DOT physical.        Past Medical History:   Diagnosis Date    Bradycardia     CAD (coronary artery disease)     Chronic kidney disease     Hypertension     Myocardial infarction 2014    Stroke 2021    Tobacco abuse          Past Surgical History:   Procedure Laterality Date    APPENDECTOMY      CARDIAC CATHETERIZATION      COLONOSCOPY      CORONARY STENT PLACEMENT      ECHO - CONVERTED  2020           Current Outpatient Medications:     apixaban (Eliquis) 5 MG tablet tablet, TAKE 1 TABLET BY MOUTH TWICE A DAY, Disp: 180 tablet, Rfl: 3    atorvastatin (LIPITOR) 40 MG tablet, Take 1 tablet by mouth Every Night., Disp: 90 tablet, Rfl: 3    clopidogrel (PLAVIX) 75 MG tablet, Take 1 tablet by mouth Daily., Disp: 90 tablet, Rfl: 2      Social History     Socioeconomic History    Marital status: Single   Tobacco Use    Smoking status: Every Day     Current packs/day: 0.00     Average packs/day: 1.5 packs/day for 42.5 years (63.7 ttl pk-yrs)     Types: Cigarettes     Start date: 1/1/1979     Last attempt to quit: 7/1/2021     Years since quitting: 3.0    Smokeless tobacco: Never    Tobacco comments:     Patient is advised to quit smoking.   Vaping Use    Vaping status: Never Used   Substance and Sexual Activity    Alcohol use: Yes     Comment: beer occasionally    Drug use: Never    Sexual activity: Not Currently     Partners: Female     Birth control/protection: Condom         Review of Systems   Constitutional: Negative for chills and fever.   HENT:  Negative for  "ear discharge and nosebleeds.    Eyes:  Negative for discharge and redness.   Cardiovascular:  Negative for chest pain, orthopnea, palpitations, paroxysmal nocturnal dyspnea and syncope.   Respiratory:  Positive for shortness of breath. Negative for cough and wheezing.    Endocrine: Negative for heat intolerance.   Skin:  Negative for rash.   Musculoskeletal:  Negative for arthritis and myalgias.   Gastrointestinal:  Negative for abdominal pain, melena, nausea and vomiting.   Genitourinary:  Negative for dysuria and hematuria.   Neurological:  Negative for dizziness, light-headedness, numbness and tremors.   Psychiatric/Behavioral:  Negative for depression. The patient is not nervous/anxious.        Procedures      ECG 12 Lead    Date/Time: 7/11/2024 4:09 PM  Performed by: Ra Campos MD    Authorized by: Ra Campos MD  Comparison: compared with previous ECG   Similar to previous ECG  Rhythm: sinus rhythm  Q waves: V1, V2 and V3      Clinical impression: abnormal EKG          ECG 12 Lead    (Results Pending)           Objective:    /84 (BP Location: Right arm, Patient Position: Sitting, Cuff Size: Adult)   Pulse 65   Resp 16   Ht 172.7 cm (67.99\")   Wt 53.5 kg (118 lb)   SpO2 98%   BMI 17.95 kg/m²         Constitutional:       Appearance: Well-developed.   Eyes:      General: No scleral icterus.        Right eye: No discharge.   HENT:      Head: Normocephalic and atraumatic.   Neck:      Thyroid: No thyromegaly.      Lymphadenopathy: No cervical adenopathy.   Pulmonary:      Effort: Pulmonary effort is normal. No respiratory distress.      Breath sounds: Normal breath sounds. No wheezing. No rales.   Cardiovascular:      Normal rate. Regular rhythm.      No gallop.    Edema:     Peripheral edema absent.   Abdominal:      Tenderness: There is no abdominal tenderness.   Skin:     Findings: No erythema or rash.   Neurological:      Mental Status: Alert and oriented to person, place, and time.           "   Assessment:       Diagnosis Plan   1. Coronary artery disease involving native coronary artery of native heart without angina pectoris  ECG 12 Lead    apixaban (Eliquis) 5 MG tablet tablet    atorvastatin (LIPITOR) 40 MG tablet    clopidogrel (PLAVIX) 75 MG tablet    Adult Transthoracic Echo Complete W/ Cont if Necessary Per Protocol    Stress Test With Myocardial Perfusion One Day      2. Essential hypertension  ECG 12 Lead    apixaban (Eliquis) 5 MG tablet tablet    clopidogrel (PLAVIX) 75 MG tablet    Adult Transthoracic Echo Complete W/ Cont if Necessary Per Protocol    Stress Test With Myocardial Perfusion One Day      3. Bradycardia, sinus  Adult Transthoracic Echo Complete W/ Cont if Necessary Per Protocol    Stress Test With Myocardial Perfusion One Day      4. Ischemic cardiomyopathy  Adult Transthoracic Echo Complete W/ Cont if Necessary Per Protocol    Stress Test With Myocardial Perfusion One Day      5. Coronary artery disease involving native coronary artery of native heart without angina pectoris  ECG 12 Lead    apixaban (Eliquis) 5 MG tablet tablet    atorvastatin (LIPITOR) 40 MG tablet    clopidogrel (PLAVIX) 75 MG tablet    Adult Transthoracic Echo Complete W/ Cont if Necessary Per Protocol    Stress Test With Myocardial Perfusion One Day    Patient denies any chest pain      6. Essential hypertension  ECG 12 Lead    apixaban (Eliquis) 5 MG tablet tablet    clopidogrel (PLAVIX) 75 MG tablet    Adult Transthoracic Echo Complete W/ Cont if Necessary Per Protocol    Stress Test With Myocardial Perfusion One Day    Has not previously tolerated any guideline directed medical therapy for cardiomyopathy due to low blood pressure               Plan:       MDM:    1.  CAD:    I would recommend to proceed with stress test.    2.  LV aneurysm:    Continue Eliquis.  Repeat echocardiogram    3.  Ischemic cardiomyopathy:    Repeat echocardiogram.  Previous EF was 45 to 50%.    4.  Mixed  hyperlipidemia:    Continue Lipitor recent LDL is 48

## 2024-07-25 ENCOUNTER — HOSPITAL ENCOUNTER (OUTPATIENT)
Dept: NUCLEAR MEDICINE | Facility: HOSPITAL | Age: 64
Discharge: HOME | End: 2024-07-25
Payer: COMMERCIAL

## 2024-07-25 DIAGNOSIS — I25.10 CORONARY ARTERY DISEASE INVOLVING NATIVE CORONARY ARTERY OF NATIVE HEART WITHOUT ANGINA PECTORIS: ICD-10-CM

## 2024-07-25 DIAGNOSIS — I10 ESSENTIAL HYPERTENSION: ICD-10-CM

## 2024-07-25 DIAGNOSIS — I25.5 ISCHEMIC CARDIOMYOPATHY: ICD-10-CM

## 2024-07-25 DIAGNOSIS — R00.1 BRADYCARDIA, SINUS: ICD-10-CM

## 2024-07-25 LAB
BH CV REST NUCLEAR ISOTOPE DOSE: 9.7 MCI
BH CV STRESS COMMENTS STAGE 1: NORMAL
BH CV STRESS DOSE REGADENOSON STAGE 1: 0.4
BH CV STRESS DURATION MIN STAGE 1: 0
BH CV STRESS DURATION SEC STAGE 1: 10
BH CV STRESS NUCLEAR ISOTOPE DOSE: 27 MCI
BH CV STRESS PROTOCOL 1: NORMAL
BH CV STRESS RECOVERY BP: NORMAL MMHG
BH CV STRESS RECOVERY HR: 72 BPM
BH CV STRESS STAGE 1: 1
LV EF NUC BP: 57 %
MAXIMAL PREDICTED HEART RATE: 158 BPM
PERCENT MAX PREDICTED HR: 63.29 %
STRESS BASELINE BP: NORMAL MMHG
STRESS BASELINE HR: 54 BPM
STRESS PERCENT HR: 74 %
STRESS POST PEAK BP: NORMAL MMHG
STRESS POST PEAK HR: 100 BPM
STRESS TARGET HR: 134 BPM

## 2024-07-25 PROCEDURE — 0 TECHNETIUM TETROFOSMIN KIT: Performed by: INTERNAL MEDICINE

## 2024-07-25 PROCEDURE — A9502 TC99M TETROFOSMIN: HCPCS | Performed by: INTERNAL MEDICINE

## 2024-07-25 PROCEDURE — 93017 CV STRESS TEST TRACING ONLY: CPT

## 2024-07-25 PROCEDURE — 78452 HT MUSCLE IMAGE SPECT MULT: CPT

## 2024-07-25 RX ORDER — REGADENOSON 0.08 MG/ML
0.4 INJECTION, SOLUTION INTRAVENOUS
Status: DISCONTINUED | OUTPATIENT
Start: 2024-07-25 | End: 2024-07-27 | Stop reason: HOSPADM

## 2024-07-25 RX ADMIN — TETROFOSMIN 1 DOSE: 1.38 INJECTION, POWDER, LYOPHILIZED, FOR SOLUTION INTRAVENOUS at 08:28

## 2024-08-01 ENCOUNTER — APPOINTMENT (OUTPATIENT)
Dept: GENERAL RADIOLOGY | Facility: HOSPITAL | Age: 64
End: 2024-08-01
Payer: COMMERCIAL

## 2024-08-01 ENCOUNTER — HOSPITAL ENCOUNTER (OUTPATIENT)
Facility: HOSPITAL | Age: 64
Discharge: HOME | End: 2024-08-01
Attending: STUDENT IN AN ORGANIZED HEALTH CARE EDUCATION/TRAINING PROGRAM
Payer: COMMERCIAL

## 2024-08-01 VITALS
HEART RATE: 106 BPM | TEMPERATURE: 101.9 F | BODY MASS INDEX: 17.73 KG/M2 | DIASTOLIC BLOOD PRESSURE: 80 MMHG | SYSTOLIC BLOOD PRESSURE: 127 MMHG | WEIGHT: 117 LBS | HEIGHT: 68 IN | RESPIRATION RATE: 20 BRPM | OXYGEN SATURATION: 100 %

## 2024-08-01 DIAGNOSIS — J40 BRONCHITIS: ICD-10-CM

## 2024-08-01 DIAGNOSIS — J06.9 UPPER RESPIRATORY TRACT INFECTION, UNSPECIFIED TYPE: Primary | ICD-10-CM

## 2024-08-01 LAB
FLUAV SUBTYP SPEC NAA+PROBE: NOT DETECTED
FLUBV RNA ISLT QL NAA+PROBE: NOT DETECTED
SARS-COV-2 RNA RESP QL NAA+PROBE: NOT DETECTED

## 2024-08-01 PROCEDURE — 71045 X-RAY EXAM CHEST 1 VIEW: CPT

## 2024-08-01 PROCEDURE — G0463 HOSPITAL OUTPT CLINIC VISIT: HCPCS

## 2024-08-01 PROCEDURE — 87636 SARSCOV2 & INF A&B AMP PRB: CPT | Performed by: STUDENT IN AN ORGANIZED HEALTH CARE EDUCATION/TRAINING PROGRAM

## 2024-08-01 RX ORDER — ALBUTEROL SULFATE 90 UG/1
2 AEROSOL, METERED RESPIRATORY (INHALATION) EVERY 4 HOURS PRN
Qty: 6.7 G | Refills: 0 | Status: SHIPPED | OUTPATIENT
Start: 2024-08-01

## 2024-08-01 RX ORDER — AMOXICILLIN AND CLAVULANATE POTASSIUM 875; 125 MG/1; MG/1
1 TABLET, FILM COATED ORAL ONCE
Status: COMPLETED | OUTPATIENT
Start: 2024-08-01 | End: 2024-08-01

## 2024-08-01 RX ORDER — AMOXICILLIN AND CLAVULANATE POTASSIUM 875; 125 MG/1; MG/1
1 TABLET, FILM COATED ORAL 2 TIMES DAILY
Qty: 10 TABLET | Refills: 0 | Status: SHIPPED | OUTPATIENT
Start: 2024-08-01 | End: 2024-08-06

## 2024-08-01 RX ORDER — ACETAMINOPHEN 500 MG
1000 TABLET ORAL ONCE
Status: COMPLETED | OUTPATIENT
Start: 2024-08-01 | End: 2024-08-01

## 2024-08-01 RX ADMIN — ACETAMINOPHEN 1000 MG: 500 TABLET ORAL at 14:42

## 2024-08-01 RX ADMIN — AMOXICILLIN AND CLAVULANATE POTASSIUM 1 TABLET: 875; 125 TABLET, FILM COATED ORAL at 15:31

## 2024-08-01 NOTE — FSED PROVIDER NOTE
"Subjective   History of Present Illness  Patient is a 63-year-old male who presents to the emergency department for cough x 3 to 4 weeks.  Patient reports worse today with fever and a little bit more productive.  Patient was coughing so much that he \"vomited \"some yellow mucus.  Has not taken any Tylenol or ibuprofen prior to arrival.  Reports he otherwise feels fine.  Denies nausea, chest pain, shortness of breath, decreased appetite.  Patient is a current smoker.  Reports having a cough on and off for him is not all that unusual.        Review of Systems   Constitutional:  Positive for chills. Negative for appetite change, diaphoresis, fatigue and fever.   Eyes:  Negative for pain and visual disturbance.   Respiratory:  Positive for cough. Negative for shortness of breath, wheezing and stridor.    Cardiovascular:  Negative for chest pain and palpitations.   Gastrointestinal:  Positive for vomiting (once). Negative for abdominal pain, constipation, diarrhea and nausea.   Genitourinary:  Negative for difficulty urinating, dysuria and flank pain.   Musculoskeletal:  Negative for gait problem and myalgias.   Skin:  Negative for rash and wound.   Neurological:  Negative for dizziness, syncope, numbness and headaches.   Psychiatric/Behavioral:  Negative for confusion and suicidal ideas. The patient is not nervous/anxious.        Past Medical History:   Diagnosis Date    Bradycardia     CAD (coronary artery disease)     Chronic kidney disease     Hypertension     Myocardial infarction 2014    Stroke 2021    Tobacco abuse        No Known Allergies    Past Surgical History:   Procedure Laterality Date    APPENDECTOMY      CARDIAC CATHETERIZATION      COLONOSCOPY      CORONARY STENT PLACEMENT      ECHO - CONVERTED  2020       Family History   Problem Relation Age of Onset    Glaucoma Mother     Colon cancer Father     Stroke Father         pacemaker    Heart disease Maternal Uncle     Heart disease Maternal Uncle  "       Social History     Socioeconomic History    Marital status: Single   Tobacco Use    Smoking status: Every Day     Current packs/day: 0.00     Average packs/day: 1.5 packs/day for 42.5 years (63.7 ttl pk-yrs)     Types: Cigarettes     Start date: 1/1/1979     Last attempt to quit: 7/1/2021     Years since quitting: 3.0    Smokeless tobacco: Never    Tobacco comments:     Patient is advised to quit smoking.   Vaping Use    Vaping status: Never Used   Substance and Sexual Activity    Alcohol use: Yes     Comment: beer occasionally    Drug use: Never    Sexual activity: Not Currently     Partners: Female     Birth control/protection: Condom           Objective   Physical Exam  Constitutional:       General: He is not in acute distress.     Appearance: He is normal weight. He is not toxic-appearing.      Comments: Thin male.    HENT:      Mouth/Throat:      Mouth: Mucous membranes are moist.      Pharynx: Oropharynx is clear. No oropharyngeal exudate or posterior oropharyngeal erythema.   Cardiovascular:      Rate and Rhythm: Regular rhythm. Tachycardia present.   Pulmonary:      Effort: Pulmonary effort is normal. No tachypnea or respiratory distress.      Breath sounds: Normal breath sounds. No stridor. No wheezing, rhonchi or rales.   Musculoskeletal:         General: Normal range of motion.   Skin:     General: Skin is warm and dry.   Neurological:      General: No focal deficit present.      Mental Status: He is alert.   Psychiatric:         Mood and Affect: Mood normal.         Behavior: Behavior normal.         Procedures           ED Course  ED Course as of 08/01/24 1632   Thu Aug 01, 2024   1458 XR CHEST 1 VW-     HISTORY: Male who is 63 years-old, cough     TECHNIQUE: Frontal view of the chest     COMPARISON:  image from CT from 7/30/2021     FINDINGS: The heart size is normal. Pulmonary vasculature is  unremarkable. Old granulomatous disease is apparent. No focal pulmonary  consolidation, pleural  effusion, or pneumothorax. No acute osseous  process.     IMPRESSION:  No focal pulmonary consolidation. Follow-up as clinical  indications persist.   [AS]   1505 COVID19: Not Detected [AS]   1505 Influenza A PCR: Not Detected [AS]   1505 Influenza B PCR: Not Detected [AS]      ED Course User Index  [AS] Mei Mathew PA-C                                           Medical Decision Making  Patient is a 63-year-old male who presents with acute on chronic cough.  Now having some fever and chills with some mucus production.  Reports he overall feels well.  Denies any concerning associated symptoms.  Patient appears ill, but in no acute distress and nontoxic.  He is febrile and tachycardic.  Other vital signs are WNL.  Negative COVID and flu.  Chest x-ray clear.  Patient does have a positive sepsis screen in triage.  Discussed further workup at this time.  Patient would like to remain in urgent care patient and is okay with symptomatic measures and oral medications only at this time.  Will cover with Augmentin for likely coverage of acute exacerbation of COPD.  I do overall think that patient's tachycardia and fever will resolve with appropriate treatment.  Acetaminophen given in ER.  Very strict return precautions given.  Discussed when to return to the emergency department.  Answered all questions.  Patient verbalized understanding and was agreeable to plan and discharge.    My differential diagnosis for cough includes but is not limited to:  Upper respiratory infection, bronchitis, pneumonia, COPD exacerbation, cough variant asthma, cardiac asthma, coronary artery disease, congestive heart failure, bacterial, viral or lung infections, lung cancer, aspiration pneumonitis, aspiration of foreign body and Covid -19           Problems Addressed:  Bronchitis: complicated acute illness or injury  Upper respiratory tract infection, unspecified type: complicated acute illness or injury    Amount and/or Complexity of Data  Reviewed  Labs:  Decision-making details documented in ED Course.  Radiology: ordered.    Risk  OTC drugs.  Prescription drug management.        Final diagnoses:   Upper respiratory tract infection, unspecified type   Bronchitis       ED Disposition  ED Disposition       ED Disposition   Discharge    Condition   Stable    Comment   --               Mingo Barton MD  [address] [address]  [place] [id number] [phone]    Schedule an appointment as soon as possible for a visit            Medication List        New Prescriptions      albuterol sulfate  (90 Base) MCG/ACT inhaler  Commonly known as: PROVENTIL HFA;VENTOLIN HFA;PROAIR HFA  Inhale 2 puffs Every 4 (Four) Hours As Needed for Wheezing or Shortness of Air.     amoxicillin-clavulanate 875-125 MG per tablet  Commonly known as: AUGMENTIN  Take 1 tablet by mouth 2 (Two) Times a Day for 5 days.               Where to Get Your Medications        These medications were sent to [facility]/pharmacy #[id number] [place], [place] - [address] [facility] - [phone]  - [phone]   [address], [facility] [address]      Phone: [phone]   albuterol sulfate  (90 Base) MCG/ACT inhaler  amoxicillin-clavulanate 875-125 MG per tablet

## 2024-08-01 NOTE — DISCHARGE INSTRUCTIONS
Take the prescribed antibiotic medicine you are given as directed until it is gone.  First dose given in ER.  Take next dose tonight or tomorrow morning is fine.  May take with food and/or a probiotic to prevent stomach upset.  Not taking all the medicine can make future infections hard to treat.  Take acetaminophen 1000 mg every 8 hours or pain or fever.  Next due at 10:30 PM tonight.    Increase electrolyte fluids.  Use albuterol inhaler as prescribed as needed for wheezing or shortness of breath.  Return to emergency department for worsening symptoms or other medical emergencies.  Recommended follow-up with PCP.  Refer to the attached instructions for further information.

## 2024-08-13 ENCOUNTER — TELEPHONE (OUTPATIENT)
Dept: CARDIOLOGY | Facility: CLINIC | Age: 64
End: 2024-08-13
Payer: COMMERCIAL

## 2024-08-13 ENCOUNTER — NURSE TRIAGE (OUTPATIENT)
Dept: CALL CENTER | Facility: HOSPITAL | Age: 64
End: 2024-08-13
Payer: COMMERCIAL

## 2024-08-13 NOTE — TELEPHONE ENCOUNTER
"Reason for Disposition  • General information question, no triage required and triager able to answer question    Additional Information  • Negative: [1] Caller is not with the adult (patient) AND [2] reporting urgent symptoms  • Negative: Lab result questions  • Negative: Medication questions  • Negative: Caller can't be reached by phone  • Negative: Caller has already spoken to PCP or another triager  • Negative: RN needs further essential information from caller in order to complete triage  • Negative: Requesting regular office appointment  • Negative: [1] Caller requesting NON-URGENT health information AND [2] PCP's office is the best resource  • Negative: Health Information question, no triage required and triager able to answer question    Answer Assessment - Initial Assessment Questions  1. REASON FOR CALL or QUESTION: \"What is your reason for calling today?\" or \"How can I best help you?\" or \"What question do you have that I can help answer?\"      Patient called to speak to Dr Davidson Occupational medicine at [facility] [phone], transferred pt to office, spoke with Ariana in office who will take a message or get Dr Davidson on line to speak with patient personally about DOT information.    Protocols used: Information Only Call - No Triage-ADULT-    "

## 2024-08-13 NOTE — TELEPHONE ENCOUNTER
Patient called to speak to Dr Davidson Occupational medicine at [facility] [phone], transferred pt to office, spoke with Ariana in office who will take a message or get Dr Davidson on line to speak with patient personally about DOT information.

## 2024-08-13 NOTE — TELEPHONE ENCOUNTER
Caller: Damien Peña    Relationship to patient: Self    Best call back number: [phone]    Chief complaint: FU    Type of visit: FU    Requested date: ASAP     If rescheduling, when is the original appointment: 8.14.24     Additional notes: PT NEEDS TO RSD APPT FOR 8.14.24, NO OPENINGS UNTIL END OF OCTOBER AND PT WANTS IN BEFORE THIS IF POSSIBLE. PT'S MEDICAL CARD EXPIRES AT THE END OF AUGUST. D.O.T DOCTOR NEEDS INFO FROM DR BUCKLEY

## 2024-08-14 ENCOUNTER — OFFICE VISIT (OUTPATIENT)
Dept: CARDIOLOGY | Facility: CLINIC | Age: 64
End: 2024-08-14
Payer: COMMERCIAL

## 2024-08-14 VITALS
HEIGHT: 68 IN | HEART RATE: 86 BPM | SYSTOLIC BLOOD PRESSURE: 120 MMHG | BODY MASS INDEX: 17.58 KG/M2 | DIASTOLIC BLOOD PRESSURE: 82 MMHG | OXYGEN SATURATION: 96 % | RESPIRATION RATE: 16 BRPM | WEIGHT: 116 LBS

## 2024-08-14 DIAGNOSIS — I25.5 ISCHEMIC CARDIOMYOPATHY: ICD-10-CM

## 2024-08-14 DIAGNOSIS — I25.10 CORONARY ARTERY DISEASE INVOLVING NATIVE CORONARY ARTERY OF NATIVE HEART WITHOUT ANGINA PECTORIS: Primary | ICD-10-CM

## 2024-08-14 DIAGNOSIS — E78.2 MIXED HYPERLIPIDEMIA: ICD-10-CM

## 2024-08-14 PROCEDURE — 99214 OFFICE O/P EST MOD 30 MIN: CPT | Performed by: INTERNAL MEDICINE

## 2024-08-14 NOTE — PROGRESS NOTES
Date of Office Visit: 2024  Encounter Provider: Dr. Ra Campos  Place of Service: [facility] CARDIOLOGY [place]  Patient Name: Damien Peña  :1960  Mingo Barton MD    Chief Complaint   Patient presents with    Coronary Artery Disease    Hypertension    Follow-up     History of Present Illness    I am pleased to see Mr. Charlton in my office today as a follow-up.     As you know, patient is 63-year-old white gentleman whose past medical history significant for CAD, coronary artery stenting, cardiomyopathy, renal artery thromboembolism, who came today for follow-up.     In , patient was diagnosed with CAD with acute coronary artery syndrome.  Patient underwent coronary artery stenting.  Patient had similar symptoms in  and had repeat cardiac catheterization and angioplasty.  Details of these procedures are not available to me.     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  Patient underwent echocardiogram which showed EF of 45 to 50%.  Apical thrombus was noted in left ventricle.  Patient had wall motion abnormality involving apex and septum.  Patient was started on Eliquis.     In 2020, patient underwent stress test which showed large anterior and apical myocardial infarction but no ischemia was noted.  Echocardiogram showed EF of 50% and hypokinesis of mid to distal septum and apex was noted.  No apical thrombus was noted.     In 2021, patient was admitted at [facility] with symptom of TIA/stroke.  Unfortunately because of unknown reason to me, patient just stopped taking Eliquis.  Patient is high risk to form ventricular thrombus because of apical infarct and scarring.  Patient recovered from his symptoms.  Patient is restarted on Eliquis and Plavix..    In 2024, patient underwent stress test which showed large anterior and  apical myocardial infarction but no ischemia.  LVEF on gated SPECT was 57%.  Patient did not go for echocardiogram.    Patient came today and overall doing well.  Patient denies any symptom of chest pain or tightness or heaviness.  No orthopnea PND no syncope or presyncope.  No leg edema noted.    Patient is doing well.  His recent stress test is unremarkable.  I would continue current treatment.  Patient is advised to follow-up in 9 months.    Patient is cleared for DOT license.  His recent stress test is unremarkable.  There is no ischemia.      Past Medical History:   Diagnosis Date    Bradycardia     CAD (coronary artery disease)     Chronic kidney disease     Hypertension     Myocardial infarction 2014    Stroke 2021    Tobacco abuse          Past Surgical History:   Procedure Laterality Date    APPENDECTOMY      CARDIAC CATHETERIZATION      COLONOSCOPY      CORONARY STENT PLACEMENT      ECHO - CONVERTED  2020           Current Outpatient Medications:     albuterol sulfate  (90 Base) MCG/ACT inhaler, Inhale 2 puffs Every 4 (Four) Hours As Needed for Wheezing or Shortness of Air., Disp: 6.7 g, Rfl: 0    apixaban (Eliquis) 5 MG tablet tablet, TAKE 1 TABLET BY MOUTH TWICE A DAY, Disp: 180 tablet, Rfl: 3    atorvastatin (LIPITOR) 40 MG tablet, Take 1 tablet by mouth Every Night., Disp: 90 tablet, Rfl: 3    clopidogrel (PLAVIX) 75 MG tablet, Take 1 tablet by mouth Daily., Disp: 90 tablet, Rfl: 2      Social History     Socioeconomic History    Marital status: Single   Tobacco Use    Smoking status: Every Day     Current packs/day: 0.00     Average packs/day: 1.5 packs/day for 42.5 years (63.7 ttl pk-yrs)     Types: Cigarettes     Start date: 1/1/1979     Last attempt to quit: 7/1/2021     Years since quitting: 3.1    Smokeless tobacco: Never    Tobacco comments:     Patient is advised to quit smoking.   Vaping Use    Vaping status: Never Used   Substance and Sexual Activity    Alcohol use: Yes     Comment:  "beer occasionally    Drug use: Never    Sexual activity: Not Currently     Partners: Female     Birth control/protection: Condom         Review of Systems   Constitutional: Negative for chills and fever.   HENT:  Negative for ear discharge and nosebleeds.    Eyes:  Negative for discharge and redness.   Cardiovascular:  Negative for chest pain, orthopnea, palpitations, paroxysmal nocturnal dyspnea and syncope.   Respiratory:  Positive for cough and shortness of breath. Negative for wheezing.    Endocrine: Negative for heat intolerance.   Skin:  Negative for rash.   Musculoskeletal:  Negative for arthritis and myalgias.   Gastrointestinal:  Negative for abdominal pain, melena, nausea and vomiting.   Genitourinary:  Negative for dysuria and hematuria.   Neurological:  Negative for dizziness, light-headedness, numbness and tremors.   Psychiatric/Behavioral:  Negative for depression. The patient is not nervous/anxious.        Procedures    Procedures    No orders to display           Objective:    /82 (BP Location: Right arm, Patient Position: Sitting, Cuff Size: Adult)   Pulse 86   Resp 16   Ht 172.7 cm (67.99\")   Wt 52.6 kg (116 lb)   SpO2 96%   BMI 17.64 kg/m²         Constitutional:       Appearance: Well-developed.   Eyes:      General: No scleral icterus.        Right eye: No discharge.   HENT:      Head: Normocephalic and atraumatic.   Neck:      Thyroid: No thyromegaly.      Lymphadenopathy: No cervical adenopathy.   Pulmonary:      Effort: Pulmonary effort is normal. No respiratory distress.      Breath sounds: Normal breath sounds. No wheezing. No rales.   Cardiovascular:      Normal rate. Regular rhythm.      No gallop.    Edema:     Peripheral edema absent.   Abdominal:      Tenderness: There is no abdominal tenderness.   Skin:     Findings: No erythema or rash.   Neurological:      Mental Status: Alert and oriented to person, place, and time.             Assessment:       Diagnosis Plan   1. " Coronary artery disease involving native coronary artery of native heart without angina pectoris        2. Ischemic cardiomyopathy        3. Mixed hyperlipidemia                 Plan:       MDM:    1.  CAD/coronary artery stenting:    Patient recent stress test is unremarkable.  No ischemia noted.  Continue current treatment    2.  Ischemic cardiomyopathy:    Patient recent EF is improved on gated SPECT imaging.  Repeat echocardiogram    3.  Mixed hyperlipidemia:    Patient is on Lipitor.    4.  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

## 2024-09-04 ENCOUNTER — HOSPITAL ENCOUNTER (OUTPATIENT)
Dept: CARDIOLOGY | Facility: HOSPITAL | Age: 64
Discharge: HOME | End: 2024-09-04
Admitting: INTERNAL MEDICINE
Payer: COMMERCIAL

## 2024-09-04 VITALS
DIASTOLIC BLOOD PRESSURE: 80 MMHG | SYSTOLIC BLOOD PRESSURE: 111 MMHG | WEIGHT: 116 LBS | BODY MASS INDEX: 18.21 KG/M2 | HEIGHT: 67 IN

## 2024-09-04 DIAGNOSIS — I25.5 ISCHEMIC CARDIOMYOPATHY: ICD-10-CM

## 2024-09-04 DIAGNOSIS — I10 ESSENTIAL HYPERTENSION: ICD-10-CM

## 2024-09-04 DIAGNOSIS — R00.1 BRADYCARDIA, SINUS: ICD-10-CM

## 2024-09-04 DIAGNOSIS — I25.10 CORONARY ARTERY DISEASE INVOLVING NATIVE CORONARY ARTERY OF NATIVE HEART WITHOUT ANGINA PECTORIS: ICD-10-CM

## 2024-09-04 LAB
AORTIC DIMENSIONLESS INDEX: 0.77 (DI)
BH CV ECHO MEAS - AO MAX PG: 2.7 MMHG
BH CV ECHO MEAS - AO MEAN PG: 2 MMHG
BH CV ECHO MEAS - AO V2 MAX: 82.6 CM/SEC
BH CV ECHO MEAS - AO V2 VTI: 17.9 CM
BH CV ECHO MEAS - AVA(I,D): 2.46 CM2
BH CV ECHO MEAS - EDV(CUBED): 79.5 ML
BH CV ECHO MEAS - EDV(MOD-SP2): 78.9 ML
BH CV ECHO MEAS - EDV(MOD-SP4): 94.1 ML
BH CV ECHO MEAS - EF(MOD-BP): 51 %
BH CV ECHO MEAS - EF(MOD-SP2): 50.1 %
BH CV ECHO MEAS - EF(MOD-SP4): 50.9 %
BH CV ECHO MEAS - ESV(CUBED): 32.8 ML
BH CV ECHO MEAS - ESV(MOD-SP2): 39.4 ML
BH CV ECHO MEAS - ESV(MOD-SP4): 46.2 ML
BH CV ECHO MEAS - FS: 25.6 %
BH CV ECHO MEAS - IVS/LVPW: 1.13 CM
BH CV ECHO MEAS - IVSD: 0.9 CM
BH CV ECHO MEAS - LA DIMENSION: 3.2 CM
BH CV ECHO MEAS - LAT PEAK E' VEL: 7.1 CM/SEC
BH CV ECHO MEAS - LV DIASTOLIC VOL/BSA (35-75): 58.7 CM2
BH CV ECHO MEAS - LV MASS(C)D: 114.2 GRAMS
BH CV ECHO MEAS - LV MAX PG: 1.6 MMHG
BH CV ECHO MEAS - LV MEAN PG: 1 MMHG
BH CV ECHO MEAS - LV SYSTOLIC VOL/BSA (12-30): 28.8 CM2
BH CV ECHO MEAS - LV V1 MAX: 63.3 CM/SEC
BH CV ECHO MEAS - LV V1 VTI: 14 CM
BH CV ECHO MEAS - LVIDD: 4.3 CM
BH CV ECHO MEAS - LVIDS: 3.2 CM
BH CV ECHO MEAS - LVOT AREA: 3.1 CM2
BH CV ECHO MEAS - LVOT DIAM: 2 CM
BH CV ECHO MEAS - LVPWD: 0.8 CM
BH CV ECHO MEAS - MED PEAK E' VEL: 7.3 CM/SEC
BH CV ECHO MEAS - MR MAX PG: 21.5 MMHG
BH CV ECHO MEAS - MR MAX VEL: 232 CM/SEC
BH CV ECHO MEAS - MV A MAX VEL: 61.8 CM/SEC
BH CV ECHO MEAS - MV DEC SLOPE: 127 CM/SEC2
BH CV ECHO MEAS - MV DEC TIME: 0.22 SEC
BH CV ECHO MEAS - MV E MAX VEL: 33.1 CM/SEC
BH CV ECHO MEAS - MV E/A: 0.54
BH CV ECHO MEAS - MV MAX PG: 1.57 MMHG
BH CV ECHO MEAS - MV MEAN PG: 1 MMHG
BH CV ECHO MEAS - MV P1/2T: 76.8 MSEC
BH CV ECHO MEAS - MV V2 VTI: 19.7 CM
BH CV ECHO MEAS - MVA(P1/2T): 2.9 CM2
BH CV ECHO MEAS - MVA(VTI): 2.23 CM2
BH CV ECHO MEAS - PA ACC TIME: 0.19 SEC
BH CV ECHO MEAS - PA V2 MAX: 80.8 CM/SEC
BH CV ECHO MEAS - RAP SYSTOLE: 3 MMHG
BH CV ECHO MEAS - RV MAX PG: 1.13 MMHG
BH CV ECHO MEAS - RV V1 MAX: 53.2 CM/SEC
BH CV ECHO MEAS - RV V1 VTI: 13.3 CM
BH CV ECHO MEAS - RVSP: 15.4 MMHG
BH CV ECHO MEAS - SV(LVOT): 44 ML
BH CV ECHO MEAS - SV(MOD-SP2): 39.5 ML
BH CV ECHO MEAS - SV(MOD-SP4): 47.9 ML
BH CV ECHO MEAS - SVI(LVOT): 27.4 ML/M2
BH CV ECHO MEAS - SVI(MOD-SP2): 24.6 ML/M2
BH CV ECHO MEAS - SVI(MOD-SP4): 29.9 ML/M2
BH CV ECHO MEAS - TAPSE (>1.6): 2.04 CM
BH CV ECHO MEAS - TR MAX PG: 12.4 MMHG
BH CV ECHO MEAS - TR MAX VEL: 176 CM/SEC
BH CV ECHO MEASUREMENTS AVERAGE E/E' RATIO: 4.6
BH CV XLRA - RV BASE: 3.7 CM
BH CV XLRA - RV MID: 2.7 CM
BH CV XLRA - TDI S': 11.9 CM/SEC
LEFT ATRIUM VOLUME INDEX: 17.9 ML/M2
SINUS: 3.7 CM

## 2024-09-04 PROCEDURE — 93306 TTE W/DOPPLER COMPLETE: CPT

## 2024-09-10 LAB
AORTIC DIMENSIONLESS INDEX: 0.77 (DI)
BH CV ECHO MEAS - AO MAX PG: 2.7 MMHG
BH CV ECHO MEAS - AO MEAN PG: 2 MMHG
BH CV ECHO MEAS - AO V2 MAX: 82.6 CM/SEC
BH CV ECHO MEAS - AO V2 VTI: 17.9 CM
BH CV ECHO MEAS - AVA(I,D): 2.46 CM2
BH CV ECHO MEAS - EDV(CUBED): 79.5 ML
BH CV ECHO MEAS - EDV(MOD-SP2): 78.9 ML
BH CV ECHO MEAS - EDV(MOD-SP4): 94.1 ML
BH CV ECHO MEAS - EF(MOD-BP): 51 %
BH CV ECHO MEAS - EF(MOD-SP2): 50.1 %
BH CV ECHO MEAS - EF(MOD-SP4): 50.9 %
BH CV ECHO MEAS - ESV(CUBED): 32.8 ML
BH CV ECHO MEAS - ESV(MOD-SP2): 39.4 ML
BH CV ECHO MEAS - ESV(MOD-SP4): 46.2 ML
BH CV ECHO MEAS - FS: 25.6 %
BH CV ECHO MEAS - IVS/LVPW: 1.13 CM
BH CV ECHO MEAS - IVSD: 0.9 CM
BH CV ECHO MEAS - LA DIMENSION: 3.2 CM
BH CV ECHO MEAS - LAT PEAK E' VEL: 7.1 CM/SEC
BH CV ECHO MEAS - LV DIASTOLIC VOL/BSA (35-75): 58.7 CM2
BH CV ECHO MEAS - LV MASS(C)D: 114.2 GRAMS
BH CV ECHO MEAS - LV MAX PG: 1.6 MMHG
BH CV ECHO MEAS - LV MEAN PG: 1 MMHG
BH CV ECHO MEAS - LV SYSTOLIC VOL/BSA (12-30): 28.8 CM2
BH CV ECHO MEAS - LV V1 MAX: 63.3 CM/SEC
BH CV ECHO MEAS - LV V1 VTI: 14 CM
BH CV ECHO MEAS - LVIDD: 4.3 CM
BH CV ECHO MEAS - LVIDS: 3.2 CM
BH CV ECHO MEAS - LVOT AREA: 3.1 CM2
BH CV ECHO MEAS - LVOT DIAM: 2 CM
BH CV ECHO MEAS - LVPWD: 0.8 CM
BH CV ECHO MEAS - MED PEAK E' VEL: 7.3 CM/SEC
BH CV ECHO MEAS - MR MAX PG: 21.5 MMHG
BH CV ECHO MEAS - MR MAX VEL: 232 CM/SEC
BH CV ECHO MEAS - MV A MAX VEL: 61.8 CM/SEC
BH CV ECHO MEAS - MV DEC SLOPE: 127 CM/SEC2
BH CV ECHO MEAS - MV DEC TIME: 0.22 SEC
BH CV ECHO MEAS - MV E MAX VEL: 33.1 CM/SEC
BH CV ECHO MEAS - MV E/A: 0.54
BH CV ECHO MEAS - MV MAX PG: 1.57 MMHG
BH CV ECHO MEAS - MV MEAN PG: 1 MMHG
BH CV ECHO MEAS - MV P1/2T: 76.8 MSEC
BH CV ECHO MEAS - MV V2 VTI: 19.7 CM
BH CV ECHO MEAS - MVA(P1/2T): 2.9 CM2
BH CV ECHO MEAS - MVA(VTI): 2.23 CM2
BH CV ECHO MEAS - PA ACC TIME: 0.19 SEC
BH CV ECHO MEAS - PA V2 MAX: 80.8 CM/SEC
BH CV ECHO MEAS - RAP SYSTOLE: 3 MMHG
BH CV ECHO MEAS - RV MAX PG: 1.13 MMHG
BH CV ECHO MEAS - RV V1 MAX: 53.2 CM/SEC
BH CV ECHO MEAS - RV V1 VTI: 13.3 CM
BH CV ECHO MEAS - RVSP: 15.4 MMHG
BH CV ECHO MEAS - SV(LVOT): 44 ML
BH CV ECHO MEAS - SV(MOD-SP2): 39.5 ML
BH CV ECHO MEAS - SV(MOD-SP4): 47.9 ML
BH CV ECHO MEAS - SVI(LVOT): 27.4 ML/M2
BH CV ECHO MEAS - SVI(MOD-SP2): 24.6 ML/M2
BH CV ECHO MEAS - SVI(MOD-SP4): 29.9 ML/M2
BH CV ECHO MEAS - TAPSE (>1.6): 2.04 CM
BH CV ECHO MEAS - TR MAX PG: 12.4 MMHG
BH CV ECHO MEAS - TR MAX VEL: 176 CM/SEC
BH CV ECHO MEASUREMENTS AVERAGE E/E' RATIO: 4.6
BH CV XLRA - RV BASE: 3.7 CM
BH CV XLRA - RV MID: 2.7 CM
BH CV XLRA - TDI S': 11.9 CM/SEC
LEFT ATRIUM VOLUME INDEX: 17.9 ML/M2
LV EF 2D ECHO EST: 45 %
SINUS: 3.7 CM

## 2024-09-12 ENCOUNTER — TELEPHONE (OUTPATIENT)
Dept: CARDIOLOGY | Facility: CLINIC | Age: 64
End: 2024-09-12
Payer: COMMERCIAL

## 2024-09-12 DIAGNOSIS — I25.10 CORONARY ARTERY DISEASE INVOLVING NATIVE CORONARY ARTERY OF NATIVE HEART WITHOUT ANGINA PECTORIS: ICD-10-CM

## 2024-09-12 DIAGNOSIS — I10 ESSENTIAL HYPERTENSION: ICD-10-CM

## 2024-09-12 RX ORDER — CLOPIDOGREL BISULFATE 75 MG/1
75 TABLET ORAL DAILY
Qty: 90 TABLET | Refills: 2 | Status: SHIPPED | OUTPATIENT
Start: 2024-09-12

## 2024-09-12 RX ORDER — ATORVASTATIN CALCIUM 40 MG/1
40 TABLET, FILM COATED ORAL NIGHTLY
Qty: 90 TABLET | Refills: 3 | Status: SHIPPED | OUTPATIENT
Start: 2024-09-12

## 2024-09-12 NOTE — TELEPHONE ENCOUNTER
Caller: Damien Peña    Relationship to patient: Self    Best call back number:  [phone]    Patient is needing: PATIENT MISSED CALL FROM THE OFFICE, DID NOT SEE TE AND NO MESSAGE WAS LEFT

## 2025-06-23 ENCOUNTER — OFFICE VISIT (OUTPATIENT)
Dept: INTERNAL MEDICINE | Facility: CLINIC | Age: 65
End: 2025-06-23
Payer: COMMERCIAL

## 2025-06-23 VITALS
HEART RATE: 59 BPM | OXYGEN SATURATION: 95 % | DIASTOLIC BLOOD PRESSURE: 72 MMHG | RESPIRATION RATE: 16 BRPM | WEIGHT: 151.1 LBS | TEMPERATURE: 98.2 F | BODY MASS INDEX: 22.9 KG/M2 | SYSTOLIC BLOOD PRESSURE: 114 MMHG | HEIGHT: 68 IN

## 2025-06-23 DIAGNOSIS — Z12.11 COLON CANCER SCREENING: ICD-10-CM

## 2025-06-23 DIAGNOSIS — Z00.00 HEALTHCARE MAINTENANCE: Primary | ICD-10-CM

## 2025-06-23 DIAGNOSIS — Z12.5 PROSTATE CANCER SCREENING: ICD-10-CM

## 2025-06-23 DIAGNOSIS — Z72.0 TOBACCO ABUSE: ICD-10-CM

## 2025-06-23 DIAGNOSIS — I10 ESSENTIAL HYPERTENSION: ICD-10-CM

## 2025-06-23 PROCEDURE — 99214 OFFICE O/P EST MOD 30 MIN: CPT | Performed by: FAMILY MEDICINE

## 2025-06-23 PROCEDURE — 99396 PREV VISIT EST AGE 40-64: CPT | Performed by: FAMILY MEDICINE

## 2025-06-23 NOTE — PROGRESS NOTES
"Chief Complaint  Fatigue    Subjective        Damien Peña presents to [facility]  Fatigue  Symptoms include fatigue.      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    Weight as of this encounter: 68.5 kg (151 lb 1.6 oz).    BMI is within normal parameters. No other follow-up for BMI required.      Physical Exam  Vitals and nursing note reviewed.   Constitutional:       Appearance: Normal appearance. He is well-developed. He is not diaphoretic.   HENT:      Head: Normocephalic and atraumatic.      Right Ear: External ear normal. There is impacted cerumen.      Left Ear: External ear normal. There is impacted cerumen.   Eyes:      General: Lids are normal. No scleral icterus.     Extraocular Movements: Extraocular movements intact.      Conjunctiva/sclera: Conjunctivae normal.   Neck:      Thyroid: No thyroid mass or thyromegaly.      Vascular: No carotid bruit or JVD.   Cardiovascular:      Rate and Rhythm: Normal rate and regular rhythm.      Pulses: Normal pulses.           Radial pulses are 2+ on the right side and 2+ on the left side.      Heart sounds: Normal heart sounds. No murmur heard.  Pulmonary:      Effort: Pulmonary effort is normal. No respiratory distress.      Breath sounds: Normal breath sounds.   Abdominal:      Palpations: Abdomen is soft.      Tenderness: There is no right CVA tenderness or left CVA " tenderness.   Musculoskeletal:      Cervical back: Normal range of motion.      Right lower leg: No edema.      Left lower leg: No edema.   Skin:     General: Skin is warm and dry.      Coloration: Skin is not pale.      Findings: No erythema or rash.   Neurological:      General: No focal deficit present.      Mental Status: He is alert and oriented to person, place, and time.      Sensory: No sensory deficit.      Deep Tendon Reflexes: Reflexes are normal and symmetric.   Psychiatric:         Mood and Affect: Mood normal.         Behavior: Behavior normal. Behavior is cooperative.         Thought Content: Thought content normal.         Judgment: Judgment normal.        Result Review :      Data reviewed : Radiologic studies CT 2021dilated ascending aorta  Endobronchial filling defects recommend to have follow-up CT scan         Assessment and Plan   Diagnoses and all orders for this visit:    1. Healthcare maintenance (Primary)  -     Lipid Panel  -     Comprehensive Metabolic Panel  -     CBC & Differential  -     TSH    2. Prostate cancer screening  -     PSA Screen    3. Essential hypertension    4. Colon cancer screening  -     Ambulatory Referral For Screening Colonoscopy    5. Tobacco abuse  -     CT Chest Low Dose Wo; Future  -     Spirometry - Pre & Post Bronchodilator; Future    Follow-up results of testing         Follow Up   No follow-ups on file.  Patient was given instructions and counseling regarding his condition or for health maintenance advice. Please see specific information pulled into the AVS if appropriate.

## 2025-06-23 NOTE — PROGRESS NOTES
Subjective   Damien Peña is a 64 y.o. male.     Chief Complaint   Patient presents with    Annual Exam         History of Present Illness   Damien Peña 64 y.o. male who presents for an Annual Wellness Visit.  he has a history of   Patient Active Problem List   Diagnosis    Kidney infarction    Coronary artery disease involving native coronary artery of native heart without angina pectoris    Tobacco abuse    CKD (chronic kidney disease)    Bradycardia, sinus    Vertigo    Essential hypertension    Left ventricular apical thrombus    Cerebellar infarct    Colon cancer screening    Healthcare maintenance    Right arm weakness    Right leg weakness    Ischemic cardiomyopathy    Prostate cancer screening   .  he has been feeling tired.   I  reviewed health maintenance with him as part of my preventative care plan.  Recommend regular dental and eye exams  The following portions of the patient's history were reviewed and updated as appropriate: allergies, current medications, past family history, past medical history, past social history, past surgical history, and problem list.    Review of Systems   Constitutional:  Positive for fatigue. Negative for appetite change, fever and unexpected weight change.   HENT:  Negative for ear pain, facial swelling and sore throat.    Eyes:  Negative for pain and visual disturbance.   Respiratory:  Negative for chest tightness, shortness of breath and wheezing.    Cardiovascular:  Negative for chest pain and palpitations.   Gastrointestinal:  Negative for abdominal pain and blood in stool.   Endocrine: Negative.    Genitourinary:  Negative for difficulty urinating and hematuria.   Musculoskeletal:  Negative for joint swelling.   Neurological:  Negative for tremors, seizures and syncope.   Hematological:  Negative for adenopathy.   Psychiatric/Behavioral:  Positive for sleep disturbance.        Objective   Physical Exam  Vitals and nursing note reviewed.   Constitutional:        Appearance: Normal appearance. He is well-developed. He is not diaphoretic.   HENT:      Head: Normocephalic and atraumatic.      Right Ear: External ear normal. There is impacted cerumen.      Left Ear: External ear normal. There is impacted cerumen.   Eyes:      General: Lids are normal. No scleral icterus.     Extraocular Movements: Extraocular movements intact.      Conjunctiva/sclera: Conjunctivae normal.   Neck:      Thyroid: No thyroid mass or thyromegaly.      Vascular: No carotid bruit or JVD.   Cardiovascular:      Rate and Rhythm: Normal rate and regular rhythm.      Pulses: Normal pulses.           Radial pulses are 2+ on the right side and 2+ on the left side.      Heart sounds: Normal heart sounds. No murmur heard.  Pulmonary:      Effort: Pulmonary effort is normal. No respiratory distress.      Breath sounds: Normal breath sounds.   Abdominal:      Palpations: Abdomen is soft.      Tenderness: There is no right CVA tenderness or left CVA tenderness.   Musculoskeletal:      Cervical back: Normal range of motion.      Right lower leg: No edema.      Left lower leg: No edema.   Skin:     General: Skin is warm and dry.      Coloration: Skin is not pale.      Findings: No erythema or rash.   Neurological:      General: No focal deficit present.      Mental Status: He is alert and oriented to person, place, and time.      Sensory: No sensory deficit.      Deep Tendon Reflexes: Reflexes are normal and symmetric.   Psychiatric:         Mood and Affect: Mood normal.         Behavior: Behavior normal. Behavior is cooperative.         Thought Content: Thought content normal.         Judgment: Judgment normal.         Assessment & Plan   Diagnoses and all orders for this visit:    1. Healthcare maintenance (Primary)  -     Lipid Panel  -     Comprehensive Metabolic Panel  -     CBC & Differential  -     TSH    2. Prostate cancer screening  -     PSA Screen    3. Essential hypertension    4. Colon cancer  screening  -     Ambulatory Referral For Screening Colonoscopy    5. Tobacco abuse  -     CT Chest Low Dose Wo; Future  -     Spirometry - Pre & Post Bronchodilator; Future      Continue attempts at healthy lifestyle with calorie appropriate diet and regular physical activity  Recommend adequate sleep 7 to 8 hours nightly  Occasional radiogram prevention of serious illness patient declines immunizations  Education provided regarding early detection screening patient needs PSA and screening colonoscopy  Follow-up ongoing management of chronic problems unless preventively annually

## 2025-06-24 LAB
ALBUMIN SERPL-MCNC: 4.4 G/DL (ref 3.9–4.9)
ALP SERPL-CCNC: 74 IU/L (ref 44–121)
ALT SERPL-CCNC: 16 IU/L (ref 0–44)
AST SERPL-CCNC: 18 IU/L (ref 0–40)
BASOPHILS # BLD AUTO: 0 X10E3/UL (ref 0–0.2)
BASOPHILS NFR BLD AUTO: 1 %
BILIRUB SERPL-MCNC: 0.8 MG/DL (ref 0–1.2)
BUN SERPL-MCNC: 12 MG/DL (ref 8–27)
BUN/CREAT SERPL: 12 (ref 10–24)
CALCIUM SERPL-MCNC: 9.6 MG/DL (ref 8.6–10.2)
CHLORIDE SERPL-SCNC: 105 MMOL/L (ref 96–106)
CHOLEST SERPL-MCNC: 116 MG/DL (ref 100–199)
CO2 SERPL-SCNC: 23 MMOL/L (ref 20–29)
CREAT SERPL-MCNC: 1.04 MG/DL (ref 0.76–1.27)
EGFRCR SERPLBLD CKD-EPI 2021: 81 ML/MIN/1.73
EOSINOPHIL # BLD AUTO: 0.2 X10E3/UL (ref 0–0.4)
EOSINOPHIL NFR BLD AUTO: 3 %
ERYTHROCYTE [DISTWIDTH] IN BLOOD BY AUTOMATED COUNT: 12.3 % (ref 11.6–15.4)
GLOBULIN SER CALC-MCNC: 2.3 G/DL (ref 1.5–4.5)
GLUCOSE SERPL-MCNC: 86 MG/DL (ref 70–99)
HCT VFR BLD AUTO: 50.2 % (ref 37.5–51)
HDLC SERPL-MCNC: 42 MG/DL
HGB BLD-MCNC: 16.4 G/DL (ref 13–17.7)
IMM GRANULOCYTES # BLD AUTO: 0 X10E3/UL (ref 0–0.1)
IMM GRANULOCYTES NFR BLD AUTO: 0 %
LDLC SERPL CALC-MCNC: 59 MG/DL (ref 0–99)
LYMPHOCYTES # BLD AUTO: 2.9 X10E3/UL (ref 0.7–3.1)
LYMPHOCYTES NFR BLD AUTO: 47 %
MCH RBC QN AUTO: 32.9 PG (ref 26.6–33)
MCHC RBC AUTO-ENTMCNC: 32.7 G/DL (ref 31.5–35.7)
MCV RBC AUTO: 101 FL (ref 79–97)
MONOCYTES # BLD AUTO: 0.4 X10E3/UL (ref 0.1–0.9)
MONOCYTES NFR BLD AUTO: 6 %
NEUTROPHILS # BLD AUTO: 2.6 X10E3/UL (ref 1.4–7)
NEUTROPHILS NFR BLD AUTO: 43 %
PLATELET # BLD AUTO: 224 X10E3/UL (ref 150–450)
POTASSIUM SERPL-SCNC: 3.9 MMOL/L (ref 3.5–5.2)
PROT SERPL-MCNC: 6.7 G/DL (ref 6–8.5)
PSA SERPL-MCNC: 1.6 NG/ML (ref 0–4)
RBC # BLD AUTO: 4.99 X10E6/UL (ref 4.14–5.8)
SODIUM SERPL-SCNC: 142 MMOL/L (ref 134–144)
TRIGL SERPL-MCNC: 70 MG/DL (ref 0–149)
TSH SERPL DL<=0.005 MIU/L-ACNC: 2.9 UIU/ML (ref 0.45–4.5)
VLDLC SERPL CALC-MCNC: 15 MG/DL (ref 5–40)
WBC # BLD AUTO: 6.1 X10E3/UL (ref 3.4–10.8)

## 2025-07-03 ENCOUNTER — HOSPITAL ENCOUNTER (OUTPATIENT)
Dept: RESPIRATORY THERAPY | Facility: HOSPITAL | Age: 65
Discharge: HOME | End: 2025-07-03
Payer: COMMERCIAL

## 2025-07-03 ENCOUNTER — HOSPITAL ENCOUNTER (OUTPATIENT)
Dept: CT IMAGING | Facility: HOSPITAL | Age: 65
Discharge: HOME | End: 2025-07-03
Payer: COMMERCIAL

## 2025-07-03 DIAGNOSIS — Z72.0 TOBACCO ABUSE: ICD-10-CM

## 2025-07-03 PROCEDURE — 71271 CT THORAX LUNG CANCER SCR C-: CPT

## 2025-07-03 PROCEDURE — 94060 EVALUATION OF WHEEZING: CPT

## 2025-07-03 PROCEDURE — 94640 AIRWAY INHALATION TREATMENT: CPT

## 2025-07-03 RX ORDER — ALBUTEROL SULFATE 0.83 MG/ML
2.5 SOLUTION RESPIRATORY (INHALATION) ONCE
Status: COMPLETED | OUTPATIENT
Start: 2025-07-03 | End: 2025-07-03

## 2025-07-03 RX ADMIN — ALBUTEROL SULFATE 2.5 MG: 2.5 SOLUTION RESPIRATORY (INHALATION) at 06:39

## 2025-07-08 NOTE — PROGRESS NOTES
Date of Office Visit: 2025  Encounter Provider: Dr. Ra Campos  Place of Service: [facility] CARDIOLOGY [place]  Patient Name: Damien Peña  :1960  Mingo Barton MD    Chief Complaint   Patient presents with    Coronary Artery Disease    Hypertension    Follow-up     History of Present Illness    I am pleased to see Mr. Charlton in my office today as a follow-up.     As you know, patient is 64-year-old white gentleman whose past medical history significant for CAD, coronary artery stenting, cardiomyopathy, renal artery thromboembolism, who came today for follow-up.     In , patient was diagnosed with CAD with acute coronary artery syndrome.  Patient underwent coronary artery stenting.  Patient had similar symptoms in  and had repeat cardiac catheterization and angioplasty.  Details of these procedures are not available to me.     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  Patient underwent echocardiogram which showed EF of 45 to 50%.  Apical thrombus was noted in left ventricle.  Patient had wall motion abnormality involving apex and septum.  Patient was started on Eliquis.     In 2020, patient underwent stress test which showed large anterior and apical myocardial infarction but no ischemia was noted.  Echocardiogram showed EF of 50% and hypokinesis of mid to distal septum and apex was noted.  No apical thrombus was noted.     In 2021, patient was admitted at [facility] with symptom of TIA/stroke.  Unfortunately because of unknown reason to me, patient just stopped taking Eliquis.  Patient is high risk to form ventricular thrombus because of apical infarct and scarring.  Patient recovered from his symptoms.  Patient is restarted on Eliquis and Plavix..    In 2024, patient underwent stress test which showed large anterior and  apical myocardial infarction but no ischemia.  LVEF on gated SPECT was 57%.  Patient did not go for echocardiogram.  In September 2024, patient underwent echocardiogram which showed EF of 45%.  Akinetic apex and distal septum.    Patient came today for follow-up.  Patient denies any chest pain.  Patient does have shortness of breath.  Patient denies any orthopnea, PND, syncope or presyncope.  No leg edema noted.    EKG showed sinus rhythm.  Heart rate was 90 bpm.  Poor progression of R wave noted in V1-V3.  Consistent with anteroseptal myocardial infarction.    Patient echocardiogram showed low ejection fraction I would start lisinopril 2.5 mg daily.  Toprol-XL 25 mg daily would be continued.  Patient is on Eliquis this will be continued.  Patient is cleared for DOT physical.      Past Medical History:   Diagnosis Date    Bradycardia     CAD (coronary artery disease)     Chronic kidney disease     Hypertension     Myocardial infarction 2014    Stroke 2021    Tobacco abuse          Past Surgical History:   Procedure Laterality Date    APPENDECTOMY      CARDIAC CATHETERIZATION      COLONOSCOPY      CORONARY STENT PLACEMENT      ECHO - CONVERTED  2020           Current Outpatient Medications:     apixaban (Eliquis) 5 MG tablet tablet, TAKE 1 TABLET BY MOUTH TWICE A DAY, Disp: 180 tablet, Rfl: 3    atorvastatin (LIPITOR) 40 MG tablet, Take 1 tablet by mouth Every Night., Disp: 90 tablet, Rfl: 3    clopidogrel (PLAVIX) 75 MG tablet, TAKE 1 TABLET BY MOUTH EVERY DAY, Disp: 90 tablet, Rfl: 2    lisinopril (PRINIVIL,ZESTRIL) 2.5 MG tablet, Take 1 tablet by mouth Daily., Disp: 90 tablet, Rfl: 3    metoprolol succinate XL (TOPROL-XL) 25 MG 24 hr tablet, Take 1 tablet by mouth Daily., Disp: 90 tablet, Rfl: 3      Social History     Socioeconomic History    Marital status: Single   Tobacco Use    Smoking status: Every Day     Current packs/day: 0.00     Average packs/day: 1.5 packs/day for 42.5 years (63.7 ttl pk-yrs)      "Types: Cigarettes     Start date: 1979     Last attempt to quit: 2021     Years since quittin.0    Smokeless tobacco: Never    Tobacco comments:     Patient is advised to quit smoking.   Vaping Use    Vaping status: Never Used   Substance and Sexual Activity    Alcohol use: Yes     Comment: beer occasionally    Drug use: Never    Sexual activity: Not Currently     Partners: Female     Birth control/protection: Condom         Review of Systems   Constitutional: Negative for chills and fever.   HENT:  Negative for ear discharge and nosebleeds.    Eyes:  Negative for discharge and redness.   Cardiovascular:  Negative for chest pain, orthopnea, palpitations, paroxysmal nocturnal dyspnea and syncope.   Respiratory:  Negative for cough, shortness of breath and wheezing.    Endocrine: Negative for heat intolerance.   Skin:  Negative for rash.   Musculoskeletal:  Negative for arthritis and myalgias.   Gastrointestinal:  Negative for abdominal pain, melena, nausea and vomiting.   Genitourinary:  Negative for dysuria and hematuria.   Neurological:  Negative for dizziness, light-headedness, numbness and tremors.   Psychiatric/Behavioral:  Negative for depression. The patient is not nervous/anxious.        Procedures      ECG 12 Lead    Date/Time: 2025 11:41 AM  Performed by: Ra Campos MD    Authorized by: Ra Campos MD  Comparison: compared with previous ECG   Similar to previous ECG  Rhythm: sinus rhythm  Q waves: V1, V2 and V3      Clinical impression: abnormal EKG          ECG 12 Lead    (Results Pending)           Objective:    /78 (BP Location: Right arm, Patient Position: Sitting, Cuff Size: Large Adult)   Pulse 90   Resp 18   Ht 172 cm (67.72\")   Wt 52.2 kg (115 lb)   SpO2 98%   BMI 17.63 kg/m²         Constitutional:       Appearance: Well-developed.   Eyes:      General: No scleral icterus.        Right eye: No discharge.   HENT:      Head: Normocephalic and atraumatic.   Neck:      " Thyroid: No thyromegaly.      Lymphadenopathy: No cervical adenopathy.   Pulmonary:      Effort: Pulmonary effort is normal. No respiratory distress.      Breath sounds: Normal breath sounds. No wheezing. No rales.   Cardiovascular:      Normal rate. Regular rhythm.      No gallop.    Edema:     Peripheral edema absent.   Abdominal:      Tenderness: There is no abdominal tenderness.   Skin:     Findings: No erythema or rash.   Neurological:      Mental Status: Alert and oriented to person, place, and time.             Assessment:       Diagnosis Plan   1. Coronary artery disease involving native coronary artery of native heart without angina pectoris  ECG 12 Lead    lisinopril (PRINIVIL,ZESTRIL) 2.5 MG tablet    metoprolol succinate XL (TOPROL-XL) 25 MG 24 hr tablet      2. Essential hypertension  ECG 12 Lead    lisinopril (PRINIVIL,ZESTRIL) 2.5 MG tablet    metoprolol succinate XL (TOPROL-XL) 25 MG 24 hr tablet      3. Ischemic cardiomyopathy  lisinopril (PRINIVIL,ZESTRIL) 2.5 MG tablet    metoprolol succinate XL (TOPROL-XL) 25 MG 24 hr tablet      4. Mixed hyperlipidemia  lisinopril (PRINIVIL,ZESTRIL) 2.5 MG tablet    metoprolol succinate XL (TOPROL-XL) 25 MG 24 hr tablet               Plan:       MDM:    1.  Cardiomyopathy:    Start Toprol-XL and lisinopril    2.  History of LV thrombus:    Patient is on Eliquis recent echo did not show any apical thrombus.  Continue Eliquis patient is high risk for forming clot.    3.  CAD/coronary artery stenting:    Continue Plavix    4.  Hypertension:    Blood pressure is controlled.

## 2025-07-09 ENCOUNTER — TELEPHONE (OUTPATIENT)
Dept: CARDIOLOGY | Facility: CLINIC | Age: 65
End: 2025-07-09

## 2025-07-09 ENCOUNTER — OFFICE VISIT (OUTPATIENT)
Dept: CARDIOLOGY | Facility: CLINIC | Age: 65
End: 2025-07-09
Payer: COMMERCIAL

## 2025-07-09 VITALS
DIASTOLIC BLOOD PRESSURE: 78 MMHG | OXYGEN SATURATION: 98 % | WEIGHT: 115 LBS | RESPIRATION RATE: 18 BRPM | SYSTOLIC BLOOD PRESSURE: 113 MMHG | HEIGHT: 68 IN | HEART RATE: 90 BPM | BODY MASS INDEX: 17.43 KG/M2

## 2025-07-09 DIAGNOSIS — I10 ESSENTIAL HYPERTENSION: ICD-10-CM

## 2025-07-09 DIAGNOSIS — I25.5 ISCHEMIC CARDIOMYOPATHY: ICD-10-CM

## 2025-07-09 DIAGNOSIS — I25.10 CORONARY ARTERY DISEASE INVOLVING NATIVE CORONARY ARTERY OF NATIVE HEART WITHOUT ANGINA PECTORIS: Primary | ICD-10-CM

## 2025-07-09 DIAGNOSIS — E78.2 MIXED HYPERLIPIDEMIA: ICD-10-CM

## 2025-07-09 DIAGNOSIS — I25.10 CORONARY ARTERY DISEASE INVOLVING NATIVE CORONARY ARTERY OF NATIVE HEART WITHOUT ANGINA PECTORIS: ICD-10-CM

## 2025-07-09 PROCEDURE — 93000 ELECTROCARDIOGRAM COMPLETE: CPT | Performed by: INTERNAL MEDICINE

## 2025-07-09 PROCEDURE — 99214 OFFICE O/P EST MOD 30 MIN: CPT | Performed by: INTERNAL MEDICINE

## 2025-07-09 RX ORDER — LISINOPRIL 2.5 MG/1
2.5 TABLET ORAL DAILY
Qty: 90 TABLET | Refills: 3 | Status: SHIPPED | OUTPATIENT
Start: 2025-07-09

## 2025-07-09 RX ORDER — METOPROLOL SUCCINATE 25 MG/1
25 TABLET, EXTENDED RELEASE ORAL DAILY
Qty: 90 TABLET | Refills: 3 | Status: SHIPPED | OUTPATIENT
Start: 2025-07-09

## 2025-07-09 RX ORDER — CLOPIDOGREL BISULFATE 75 MG/1
75 TABLET ORAL DAILY
Qty: 90 TABLET | Refills: 2 | Status: SHIPPED | OUTPATIENT
Start: 2025-07-09

## 2025-07-28 ENCOUNTER — OFFICE VISIT (OUTPATIENT)
Dept: INTERNAL MEDICINE | Facility: CLINIC | Age: 65
End: 2025-07-28
Payer: COMMERCIAL

## 2025-07-28 VITALS
WEIGHT: 115.6 LBS | DIASTOLIC BLOOD PRESSURE: 74 MMHG | HEART RATE: 64 BPM | HEIGHT: 68 IN | BODY MASS INDEX: 17.52 KG/M2 | OXYGEN SATURATION: 98 % | SYSTOLIC BLOOD PRESSURE: 108 MMHG | TEMPERATURE: 97.8 F

## 2025-07-28 DIAGNOSIS — J43.1 PANLOBULAR EMPHYSEMA: ICD-10-CM

## 2025-07-28 DIAGNOSIS — I10 ESSENTIAL HYPERTENSION: Primary | ICD-10-CM

## 2025-07-28 PROCEDURE — 99215 OFFICE O/P EST HI 40 MIN: CPT | Performed by: FAMILY MEDICINE

## 2025-07-28 RX ORDER — BUDESONIDE, GLYCOPYRROLATE, AND FORMOTEROL FUMARATE 160; 9; 4.8 UG/1; UG/1; UG/1
2 AEROSOL, METERED RESPIRATORY (INHALATION) 2 TIMES DAILY
Qty: 2 EACH | Refills: 0 | COMMUNITY
Start: 2025-07-28

## 2025-07-28 NOTE — PROGRESS NOTES
"Chief Complaint  Hypertension    Subjective        Damien Peña presents to [facility] PRIMARY [facility]  Primary Care Follow-Up  Conditions present: other    Treatment compliance:  All of the time  Treatment barriers:  No complaince problems  Exercise:  Rarely    Patient follows up for ongoing management of hypertension and underlying COPD with intermittent cough he had recent CT scan which reveals a left lower lobe small nodule with no other evidence of nodules however some underlying emphysematous changes  Recent pulmonary function test reveals some obstructive pattern  Objective   Vital Signs:  /74   Pulse 64   Temp 97.8 °F (36.6 °C)   Ht 172.7 cm (68\")   Wt 52.4 kg (115 lb 9.6 oz)   SpO2 98%   BMI 17.58 kg/m²   Estimated body mass index is 17.58 kg/m² as calculated from the following:    Height as of this encounter: 172.7 cm (68\").    Weight as of this encounter: 52.4 kg (115 lb 9.6 oz).          Physical Exam   Result Review :    Common labs          6/23/2025    13:18   Common Labs   Glucose 86    BUN 12    Creatinine 1.04    Sodium 142    Potassium 3.9    Chloride 105    Calcium 9.6    Albumin 4.4    Total Bilirubin 0.8    Alkaline Phosphatase 74    AST (SGOT) 18    ALT (SGPT) 16    WBC 6.1    Hemoglobin 16.4    Hematocrit 50.2    Platelets 224    Total Cholesterol 116    Triglycerides 70    HDL Cholesterol 42    LDL Cholesterol  59    PSA 1.6      Data reviewed : Radiologic studies low-dose CT7/3/2025  Left lower lung nodule         Assessment and Plan   Diagnoses and all orders for this visit:    1. Essential hypertension (Primary)    2. Panlobular emphysema    Other orders  -     Budeson-Glycopyrrol-Formoterol (Breztri Aerosphere) 160-9-4.8 MCG/ACT aerosol inhaler; Inhale 2 puffs 2 (Two) Times a Day.  Dispense: 2 each; Refill: 0    Initiate Breztri for COPD demonstrated how to use patient has samples will call in 1 month benefit of inhaler  Monitor blood pressure goal is " less than 140/90 continue lisinopril metoprolol  Lung nodule on CT recommend repeat in 1 year       I spent 42 minutes caring for Damien on this date of service. This time includes time spent by me in the following activities:preparing for the visit, reviewing tests, performing a medically appropriate examination and/or evaluation , counseling and educating the patient/family/caregiver, ordering medications, tests, or procedures, documenting information in the medical record, and independently interpreting results and communicating that information with the patient/family/caregiver  Follow Up   No follow-ups on file.  Patient was given instructions and counseling regarding his condition or for health maintenance advice. Please see specific information pulled into the AVS if appropriate.